# Patient Record
Sex: MALE | Race: WHITE | NOT HISPANIC OR LATINO | Employment: OTHER | URBAN - METROPOLITAN AREA
[De-identification: names, ages, dates, MRNs, and addresses within clinical notes are randomized per-mention and may not be internally consistent; named-entity substitution may affect disease eponyms.]

---

## 2017-01-04 ENCOUNTER — OFFICE VISIT (OUTPATIENT)
Dept: CARDIOLOGY | Facility: CLINIC | Age: 72
End: 2017-01-04

## 2017-01-04 VITALS
DIASTOLIC BLOOD PRESSURE: 64 MMHG | HEIGHT: 70 IN | WEIGHT: 173.3 LBS | BODY MASS INDEX: 24.81 KG/M2 | HEART RATE: 56 BPM | SYSTOLIC BLOOD PRESSURE: 98 MMHG

## 2017-01-04 DIAGNOSIS — R55 SYNCOPE, UNSPECIFIED SYNCOPE TYPE: Primary | ICD-10-CM

## 2017-01-04 DIAGNOSIS — I95.1 ORTHOSTATIC HYPOTENSION: ICD-10-CM

## 2017-01-04 PROCEDURE — 93000 ELECTROCARDIOGRAM COMPLETE: CPT | Performed by: INTERNAL MEDICINE

## 2017-01-04 PROCEDURE — 99203 OFFICE O/P NEW LOW 30 MIN: CPT | Performed by: INTERNAL MEDICINE

## 2017-01-04 NOTE — PROGRESS NOTES
Subjective:     Encounter Date:01/04/2017      Patient ID: Antione Douglas is a 71 y.o. male.    Chief Complaint:Loss of Consciousness        Past Medical History   Diagnosis Date   • Allergic rhinitis    • Anxiety    • Chronic neck pain    • COPD (chronic obstructive pulmonary disease)    • Depression    • Diabetes mellitus    • Duodenal adenoma    • Low back pain    • Sleep apnea      intolerant to CPAP     Past Surgical History   Procedure Laterality Date   • Back surgery         No Known Allergies      Current Outpatient Prescriptions:   •  albuterol (PROVENTIL HFA;VENTOLIN HFA) 108 (90 BASE) MCG/ACT inhaler, ProAir  (90 Base) MCG/ACT Inhalation Aerosol Solution; Patient Sig: ProAir  (90 Base) MCG/ACT Inhalation Aerosol Solution ; 0; 08-Aug-2013; Active, Disp: , Rfl:   •  ALPRAZolam (XANAX) 1 MG tablet, Take 1 tablet by mouth 3 (Three) Times a Day As Needed for anxiety., Disp: 90 tablet, Rfl: 3  •  budesonide-formoterol (SYMBICORT) 160-4.5 MCG/ACT inhaler, Inhale 2 puffs 2 (two) times a day., Disp: , Rfl:   •  furosemide (LASIX) 20 MG tablet, Take 2 tablets by mouth 2 (Two) Times a Day., Disp: 60 tablet, Rfl: 3  •  halobetasol (ULTRAVATE) 0.05 % cream, Apply  topically 2 (Two) Times a Day., Disp: 15 g, Rfl: 1  •  HYDROcodone-acetaminophen (NORCO)  MG per tablet, , Disp: , Rfl:   •  loratadine (CLARITIN) 10 MG tablet, Take 10 mg by mouth Daily., Disp: , Rfl:   •  omeprazole (PriLOSEC) 20 MG capsule, Take 20 mg by mouth Daily., Disp: , Rfl:   •  SITagliptin (JANUVIA) 100 MG tablet, Take 1 tablet by mouth Daily., Disp: 28 tablet, Rfl: 0  •  traZODone (DESYREL) 100 MG tablet, Take 3 tablets by mouth every night., Disp: 270 tablet, Rfl: 3        History of Present Illness    Patient is a 71-year-old  male who we're asked to see for further evaluation of syncope.  He has no previous history of coronary disease.  Notes that he was seen many years ago by Dr. North.  Within the last couple of  "months patient had an episode of syncope.  He went to get a pedicure and at that time noted that he did not feel well was \"uncomfortable\".  He stood up and shortly after this had a syncopal episode.  He presented to his primary care for further evaluation.  There EKG was noted to be abnormal and he was referred to the emergency department for further evaluation.  There he was ruled out for myocardial infarction and discharged home.  He was subsequently scheduled to have cardiac follow-up with us.  Patient notes he has had one other episode of syncope over the last year similar to this usually with position changes.  He denies any chest pain, pressure, tightness.  Does note some shortness of breath with activity.  He is overall quite limited in physical activity secondary to chronic back pain, and walks with the assistance of a cane.  He notes he is typically unsteady.  Denies any edema.  Has had some \"falls\" at home.  Complains that his feet and hands are \"ice cold\".  Patient also incidentally notes that he has lost roughly 65 pounds over the last year unintentionally.  He has not had a colonoscopy performed in almost 5 years per his report.  Unexplained 65 pound weight loss in the morning year.  He notes that he has been drinking lots of water lately because he is \"always thirsty\".  He is recently had his Lasix dose dropped from 3 pills daily to twice daily.  Patient and wife notes he drinks an estimated at 50 ounces or more of water daily in addition to his meals.  The following portions of the patient's history were reviewed and updated as appropriate: allergies, current medications, past family history, past medical history, past social history, past surgical history and problem list.    Family History   Problem Relation Age of Onset   • Coronary artery disease Mother    • Hypertension Mother    • Pancreatic cancer Father    • Coronary artery disease Father        Social History   Substance Use Topics   • " "Smoking status: Former Smoker   • Smokeless tobacco: None      Comment: quit 10 years ago   • Alcohol use Yes      Comment: rare         Review of Systems   Constitution: Positive for weight loss. Negative for fever, weakness and malaise/fatigue.   HENT: Negative for headaches and nosebleeds.    Eyes: Negative for redness and visual disturbance.   Cardiovascular: Positive for syncope. Negative for orthopnea, palpitations and paroxysmal nocturnal dyspnea.   Respiratory: Positive for shortness of breath. Negative for cough, snoring, sputum production and wheezing.    Endocrine: Positive for cold intolerance.   Hematologic/Lymphatic: Negative for bleeding problem.   Skin: Negative for flushing, itching and rash.   Musculoskeletal: Negative for falls, joint pain and muscle cramps.   Gastrointestinal: Negative for abdominal pain, diarrhea, heartburn, nausea and vomiting.   Genitourinary: Negative for hematuria.   Neurological: Positive for disturbances in coordination and dizziness. Negative for excessive daytime sleepiness and tremors.   Psychiatric/Behavioral: Positive for depression. Negative for substance abuse. The patient is nervous/anxious.           Objective:    height is 70\" (177.8 cm) and weight is 173 lb 4.8 oz (78.6 kg). His blood pressure is 98/64 and his pulse is 56.         Physical Exam   Constitutional: He is oriented to person, place, and time. He appears well-developed and well-nourished.   HENT:   Head: Normocephalic and atraumatic.   Mouth/Throat: Oropharynx is clear and moist.   Eyes: Conjunctivae are normal. Pupils are equal, round, and reactive to light.   Neck: Normal carotid pulses and no JVD present. Carotid bruit is not present. No thyromegaly present.   Cardiovascular: Normal rate, regular rhythm, S1 normal and S2 normal.  Exam reveals no gallop and no friction rub.    No murmur heard.  Pulses:       Carotid pulses are 2+ on the right side, and 2+ on the left side.       Dorsalis pedis " pulses are 2+ on the right side, and 2+ on the left side.        Posterior tibial pulses are 2+ on the right side, and 2+ on the left side.   Pulmonary/Chest: No respiratory distress. He has no wheezes. He has no rales. He exhibits no tenderness.   Abdominal: He exhibits no distension, no abdominal bruit and no mass. There is no hepatosplenomegaly. There is no tenderness. There is no rebound.   Musculoskeletal: He exhibits no edema, tenderness or deformity.   Lymphadenopathy:     He has no cervical adenopathy.   Neurological: He is alert and oriented to person, place, and time. He has normal strength.   Skin: Skin is warm and dry. No rash noted. No cyanosis. Nails show no clubbing.   Psychiatric: He has a normal mood and affect. Cognition and memory are normal.         ECG 12 Lead  Date/Time: 1/4/2017 2:44 PM  Performed by: VAL AGUIRRE  Authorized by: VAL AGUIRRE   Rhythm: sinus rhythm  Comments: Nonspecific ST segment changes                  Assessment:   Assessment/Plan      Antione was seen today for loss of consciousness.    Diagnoses and all orders for this visit:    Syncope, unspecified syncope type    Orthostatic hypotension    1.  Recurrent orthostatic dizziness and one possible episode of syncope.  Suspect this is due to relative volume depletion plus some possible autonomic neuropathy.  (Patient is on a continuing high-dose of diuretic and requires lots of volume replacement and despite this, he still has BUN and creatinine consistent with dehydration.  2.  Unexplained 65 pound weight loss over the last year.  3.  Poor functional capacity due to orthopedic/back issues.      Plan: Decrease furosemide to 40 mg daily, and decreased water intake to drink on Thursday.  Patient if still no edema after 7-10 days we'll discontinue the diuretic altogether.  If patient still symptomatic with the correction of his volume depletion, can consider the addition of midodrine or Florinef at that time.       Please  note that portions of this note may have been completed with a voice recognition program. Efforts were made to edit the dictations, but occasionally words are mistranscribed.

## 2017-01-04 NOTE — LETTER
January 4, 2017     Vahe Coronel MD  [address]  [place]  [facility] [address]    Patient: Antione Douglas   YOB: 1945   Date of Visit: 1/4/2017       Dear Dr. Homer MD:    Thank you for referring Antione Douglas to me for evaluation. Below are the relevant portions of my assessment and plan of care.    If you have questions, please do not hesitate to call me. I look forward to following Antione along with you.         Sincerely,        Juan Castillo MD        CC: No Recipients  Juan Castillo MD  1/4/2017  2:46 PM  Signed  Subjective:     Encounter Date:01/04/2017      Patient ID: Antione Douglas is a 71 y.o. male.    Chief Complaint:Loss of Consciousness        Past Medical History   Diagnosis Date   • Allergic rhinitis    • Anxiety    • Chronic neck pain    • COPD (chronic obstructive pulmonary disease)    • Depression    • Diabetes mellitus    • Duodenal adenoma    • Low back pain    • Sleep apnea      intolerant to CPAP     Past Surgical History   Procedure Laterality Date   • Back surgery         No Known Allergies      Current Outpatient Prescriptions:   •  albuterol (PROVENTIL HFA;VENTOLIN HFA) 108 (90 BASE) MCG/ACT inhaler, ProAir  (90 Base) MCG/ACT Inhalation Aerosol Solution; Patient Sig: ProAir  (90 Base) MCG/ACT Inhalation Aerosol Solution ; 0; 08-Aug-2013; Active, Disp: , Rfl:   •  ALPRAZolam (XANAX) 1 MG tablet, Take 1 tablet by mouth 3 (Three) Times a Day As Needed for anxiety., Disp: 90 tablet, Rfl: 3  •  budesonide-formoterol (SYMBICORT) 160-4.5 MCG/ACT inhaler, Inhale 2 puffs 2 (two) times a day., Disp: , Rfl:   •  furosemide (LASIX) 20 MG tablet, Take 2 tablets by mouth 2 (Two) Times a Day., Disp: 60 tablet, Rfl: 3  •  halobetasol (ULTRAVATE) 0.05 % cream, Apply  topically 2 (Two) Times a Day., Disp: 15 g, Rfl: 1  •  HYDROcodone-acetaminophen (NORCO)  MG per tablet, , Disp: , Rfl:   •  loratadine (CLARITIN) 10 MG tablet, Take 10 mg by mouth Daily., Disp: , Rfl:   •   "omeprazole (PriLOSEC) 20 MG capsule, Take 20 mg by mouth Daily., Disp: , Rfl:   •  SITagliptin (JANUVIA) 100 MG tablet, Take 1 tablet by mouth Daily., Disp: 28 tablet, Rfl: 0  •  traZODone (DESYREL) 100 MG tablet, Take 3 tablets by mouth every night., Disp: 270 tablet, Rfl: 3        History of Present Illness    Patient is a 71-year-old  male who we're asked to see for further evaluation of syncope.  He has no previous history of coronary disease.  Notes that he was seen many years ago by Dr. North.  Within the last couple of months patient had an episode of syncope.  He went to get a pedicure and at that time noted that he did not feel well was \"uncomfortable\".  He stood up and shortly after this had a syncopal episode.  He presented to his primary care for further evaluation.  There EKG was noted to be abnormal and he was referred to the emergency department for further evaluation.  There he was ruled out for myocardial infarction and discharged home.  He was subsequently scheduled to have cardiac follow-up with us.  Patient notes he has had one other episode of syncope over the last year similar to this usually with position changes.  He denies any chest pain, pressure, tightness.  Does note some shortness of breath with activity.  He is overall quite limited in physical activity secondary to chronic back pain, and walks with the assistance of a cane.  He notes he is typically unsteady.  Denies any edema.  Has had some \"falls\" at home.  Complains that his feet and hands are \"ice cold\".  Patient also incidentally notes that he has lost roughly 65 pounds over the last year unintentionally.  He has not had a colonoscopy performed in almost 5 years per his report.  Unexplained 65 pound weight loss in the morning year.  He notes that he has been drinking lots of water lately because he is \"always thirsty\".  He is recently had his Lasix dose dropped from 3 pills daily to twice daily.  Patient and wife notes he " "drinks an estimated at 50 ounces or more of water daily in addition to his meals.  The following portions of the patient's history were reviewed and updated as appropriate: allergies, current medications, past family history, past medical history, past social history, past surgical history and problem list.    Family History   Problem Relation Age of Onset   • Coronary artery disease Mother    • Hypertension Mother    • Pancreatic cancer Father    • Coronary artery disease Father        Social History   Substance Use Topics   • Smoking status: Former Smoker   • Smokeless tobacco: None      Comment: quit 10 years ago   • Alcohol use Yes      Comment: rare         Review of Systems   Constitution: Positive for weight loss. Negative for fever, weakness and malaise/fatigue.   HENT: Negative for headaches and nosebleeds.    Eyes: Negative for redness and visual disturbance.   Cardiovascular: Positive for syncope. Negative for orthopnea, palpitations and paroxysmal nocturnal dyspnea.   Respiratory: Positive for shortness of breath. Negative for cough, snoring, sputum production and wheezing.    Endocrine: Positive for cold intolerance.   Hematologic/Lymphatic: Negative for bleeding problem.   Skin: Negative for flushing, itching and rash.   Musculoskeletal: Negative for falls, joint pain and muscle cramps.   Gastrointestinal: Negative for abdominal pain, diarrhea, heartburn, nausea and vomiting.   Genitourinary: Negative for hematuria.   Neurological: Positive for disturbances in coordination and dizziness. Negative for excessive daytime sleepiness and tremors.   Psychiatric/Behavioral: Positive for depression. Negative for substance abuse. The patient is nervous/anxious.           Objective:    height is 70\" (177.8 cm) and weight is 173 lb 4.8 oz (78.6 kg). His blood pressure is 98/64 and his pulse is 56.         Physical Exam   Constitutional: He is oriented to person, place, and time. He appears well-developed and " well-nourished.   HENT:   Head: Normocephalic and atraumatic.   Mouth/Throat: Oropharynx is clear and moist.   Eyes: Conjunctivae are normal. Pupils are equal, round, and reactive to light.   Neck: Normal carotid pulses and no JVD present. Carotid bruit is not present. No thyromegaly present.   Cardiovascular: Normal rate, regular rhythm, S1 normal and S2 normal.  Exam reveals no gallop and no friction rub.    No murmur heard.  Pulses:       Carotid pulses are 2+ on the right side, and 2+ on the left side.       Dorsalis pedis pulses are 2+ on the right side, and 2+ on the left side.        Posterior tibial pulses are 2+ on the right side, and 2+ on the left side.   Pulmonary/Chest: No respiratory distress. He has no wheezes. He has no rales. He exhibits no tenderness.   Abdominal: He exhibits no distension, no abdominal bruit and no mass. There is no hepatosplenomegaly. There is no tenderness. There is no rebound.   Musculoskeletal: He exhibits no edema, tenderness or deformity.   Lymphadenopathy:     He has no cervical adenopathy.   Neurological: He is alert and oriented to person, place, and time. He has normal strength.   Skin: Skin is warm and dry. No rash noted. No cyanosis. Nails show no clubbing.   Psychiatric: He has a normal mood and affect. Cognition and memory are normal.         ECG 12 Lead  Date/Time: 1/4/2017 2:44 PM  Performed by: VAL AGUIRRE  Authorized by: VAL AGUIRRE   Rhythm: sinus rhythm  Comments: Nonspecific ST segment changes                  Assessment:   Assessment/Plan      Antione was seen today for loss of consciousness.    Diagnoses and all orders for this visit:    Syncope, unspecified syncope type    Orthostatic hypotension    1.  Recurrent orthostatic dizziness and one possible episode of syncope.  Suspect this is due to relative volume depletion plus some possible autonomic neuropathy.  (Patient is on a continuing high-dose of diuretic and requires lots of volume replacement  and despite this, he still has BUN and creatinine consistent with dehydration.  2.  Unexplained 65 pound weight loss over the last year.  3.  Poor functional capacity due to orthopedic/back issues.      Plan: Decrease furosemide to 40 mg daily, and decreased water intake to drink on Thursday.  Patient if still no edema after 7-10 days we'll discontinue the diuretic altogether.  If patient still symptomatic with the correction of his volume depletion, can consider the addition of midodrine or Florinef at that time.       Please note that portions of this note may have been completed with a voice recognition program. Efforts were made to edit the dictations, but occasionally words are mistranscribed.

## 2017-01-09 ENCOUNTER — TELEPHONE (OUTPATIENT)
Dept: FAMILY MEDICINE CLINIC | Facility: CLINIC | Age: 72
End: 2017-01-09

## 2017-01-09 NOTE — TELEPHONE ENCOUNTER
----- Message from Ashanti Badillo sent at 1/9/2017 11:01 AM EST -----  Contact: EVIE;WIFE-KRIS  NEEDS RX FOR CONTOUR GLUCOSE TEST STRIPS    [facility]    [phone]  MESSAGE OK

## 2017-01-16 ENCOUNTER — TELEPHONE (OUTPATIENT)
Dept: FAMILY MEDICINE CLINIC | Facility: CLINIC | Age: 72
End: 2017-01-16

## 2017-01-16 NOTE — TELEPHONE ENCOUNTER
----- Message from Margaret Kate sent at 1/16/2017  9:37 AM EST -----  Contact: EVIE RAMIREZ WIFE CALLED  IS CHECKING TO SEE IF WE HAVE ANY SAMPLES OF GENUVIA  IS THE BEST NUMBER TO CALL

## 2017-02-06 ENCOUNTER — OFFICE VISIT (OUTPATIENT)
Dept: FAMILY MEDICINE CLINIC | Facility: CLINIC | Age: 72
End: 2017-02-06

## 2017-02-06 VITALS
HEIGHT: 70 IN | TEMPERATURE: 97.8 F | WEIGHT: 180.6 LBS | RESPIRATION RATE: 20 BRPM | SYSTOLIC BLOOD PRESSURE: 110 MMHG | DIASTOLIC BLOOD PRESSURE: 60 MMHG | HEART RATE: 80 BPM | BODY MASS INDEX: 25.86 KG/M2

## 2017-02-06 DIAGNOSIS — E11.9 TYPE 2 DIABETES MELLITUS WITHOUT COMPLICATION, WITHOUT LONG-TERM CURRENT USE OF INSULIN (HCC): ICD-10-CM

## 2017-02-06 DIAGNOSIS — N18.30 CHRONIC KIDNEY DISEASE, STAGE 3 (HCC): ICD-10-CM

## 2017-02-06 DIAGNOSIS — R74.8 ABNORMAL LIVER ENZYMES: ICD-10-CM

## 2017-02-06 DIAGNOSIS — K59.04 CHRONIC IDIOPATHIC CONSTIPATION: ICD-10-CM

## 2017-02-06 DIAGNOSIS — Z12.11 COLON CANCER SCREENING: ICD-10-CM

## 2017-02-06 DIAGNOSIS — I10 ESSENTIAL HYPERTENSION: Primary | ICD-10-CM

## 2017-02-06 DIAGNOSIS — R49.0 HOARSENESS: ICD-10-CM

## 2017-02-06 PROCEDURE — 99214 OFFICE O/P EST MOD 30 MIN: CPT | Performed by: FAMILY MEDICINE

## 2017-02-06 RX ORDER — GABAPENTIN 300 MG/1
CAPSULE ORAL
COMMUNITY
Start: 2017-01-05 | End: 2017-03-23 | Stop reason: SINTOL

## 2017-02-06 RX ORDER — LUBIPROSTONE 24 UG/1
24 CAPSULE ORAL 2 TIMES DAILY WITH MEALS
Qty: 16 CAPSULE | Refills: 0 | COMMUNITY
Start: 2017-02-06 | End: 2017-08-28

## 2017-02-06 RX ORDER — DULOXETIN HYDROCHLORIDE 30 MG/1
CAPSULE, DELAYED RELEASE ORAL
COMMUNITY
Start: 2017-01-12 | End: 2018-02-22 | Stop reason: DRUGHIGH

## 2017-02-06 NOTE — PROGRESS NOTES
Subjective    FU Diab Type 2, HTN, abnormal KFT's and chronic constipation.    Antione Douglas is a 71 y.o. male.     History of Present Illness     Amitiza 24 mcg - was too expensive and wasn't able to purchase and try; though with intermittent significant constipation problems.    Feet occ swelling, but not too bad; generally uses his furosemide twice a day.  Still with occasional lightheadedness upon getting up from a seated position.  Clears after a few seconds.  It is better but not resolved from last visit.  Psyche streaking plenty of fluids.    Kidney function had improved slightly in terms of GFR at last visit.  Prior to that we have stopped his metformin and placed him on Januvia given the decline in his kidney function.  He does not use anti-inflammatories.    Breathing has been stable.    Continues with hoarseness and some intermittent left ear problems/pain.  Had seen an ENT who placed him on antibiotic and was going to see him back in follow-up.  However in the interim his insurance changed and he can no longer see the provider.    The following portions of the patient's history were reviewed and updated as appropriate: allergies, current medications, past medical history, past social history and problem list.    Review of Systems   Constitutional: Negative.  Negative for unexpected weight change.   HENT: Positive for ear pain (left) and voice change (Hoarseness  for the last few months). Negative for congestion, postnasal drip and sore throat.    Respiratory: Negative.  Negative for cough, shortness of breath and wheezing.    Cardiovascular: Positive for leg swelling (Only slight). Negative for chest pain.   Gastrointestinal: Negative.    Skin: Negative.  Negative for rash.   Neurological: Negative.  Negative for dizziness and headaches.   Psychiatric/Behavioral: Negative for dysphoric mood. The patient is nervous/anxious (stable).        Objective   Physical Exam   Constitutional: He is oriented to  person, place, and time. He appears well-developed and well-nourished.   HENT:   Head: Normocephalic.   Left TM is slightly retracted but clear with a small serous air-fluid level.  No erythema   Eyes: Pupils are equal, round, and reactive to light.   Neck: Neck supple. Carotid bruit is not present.   Cardiovascular: Normal rate and regular rhythm.    Pulmonary/Chest: Effort normal and breath sounds normal. He has no wheezes.   Abdominal: Soft. Bowel sounds are normal.   Musculoskeletal: He exhibits no edema.   Lymphadenopathy:     He has no cervical adenopathy.   Neurological: He is alert and oriented to person, place, and time. He exhibits normal muscle tone.   Slight decrease in sensation to light touch in his lower extremities to about the mid calf.  No skin breakdown or ulcerations noted   Skin: Skin is warm and dry.   Psychiatric: He has a normal mood and affect. His behavior is normal.   Nursing note and vitals reviewed.      Assessment/Plan   Antione was seen today for follow-up, diabetes, hypertension, abnormal kft and chronic constipation.    Diagnoses and all orders for this visit:    Essential hypertension  -     Comprehensive Metabolic Panel  -     CBC & Differential    Type 2 diabetes mellitus without complication, without long-term current use of insulin  -     Hemoglobin A1c  -     MicroAlbumin, Urine, Random    Abnormal liver enzymes    Chronic kidney disease, stage 3  -     Comprehensive Metabolic Panel  -     CBC & Differential    Chronic idiopathic constipation  -     Ambulatory Referral For Screening Colonoscopy    Colon cancer screening  -     Ambulatory Referral For Screening Colonoscopy    Hoarseness  -     Ambulatory Referral to ENT (Otolaryngology)    [id number]  01/2020  Amitiza samples as above; to let us know if this is not helpful for his bowels.  Hopefully once we get things going again over-the-counter MiraLAX etc. will prove to be beneficial.  If not, we'll have the consultation via  colonoscopy already arranged.    To need to monitor his liver function.  Normalized at last visit.    Renal function had improved at last check.  Still not back to baseline of a GFR of about 50-55 but had significantly improved.  Continue avoidance of anti-inflammatory medications and maintenance of hydration advised.  We may also need to cut back on his furosemide.  He is having minimal symptomatic edema and with the orthostatic changes as well as decreased kidney function we may need to back off.  For now can continue.    Needs a new referral to ENT as current ENT no longer taking his assurance.  Also we'll redo a order for screening colonoscopy that has not been followed through on.    Patient will contact us if he has needs prior to his next scheduled appointment.    At this time he has Future Appointments  Date Time Provider Department Center   5/8/2017 2:00 PM MD LEATHA Schulte None    scheduled:    This note signed by Vahe Coronel MD 3:04 PM 2/6/2017

## 2017-02-07 LAB
ALBUMIN SERPL-MCNC: 4.1 G/DL (ref 3.2–4.8)
ALBUMIN/GLOB SERPL: 1.4 G/DL (ref 1.5–2.5)
ALP SERPL-CCNC: 100 U/L (ref 25–100)
ALT SERPL-CCNC: 37 U/L (ref 7–40)
AST SERPL-CCNC: 27 U/L (ref 0–33)
BASOPHILS # BLD AUTO: 0.06 10*3/MM3 (ref 0–0.2)
BASOPHILS NFR BLD AUTO: 0.6 % (ref 0–1)
BILIRUB SERPL-MCNC: 0.4 MG/DL (ref 0.3–1.2)
BUN SERPL-MCNC: 16 MG/DL (ref 9–23)
BUN/CREAT SERPL: 11.4 (ref 7–25)
CALCIUM SERPL-MCNC: 9.8 MG/DL (ref 8.7–10.4)
CHLORIDE SERPL-SCNC: 104 MMOL/L (ref 99–109)
CO2 SERPL-SCNC: 32 MMOL/L (ref 20–31)
CREAT SERPL-MCNC: 1.4 MG/DL (ref 0.6–1.3)
EOSINOPHIL # BLD AUTO: 0.26 10*3/MM3 (ref 0.1–0.3)
EOSINOPHIL NFR BLD AUTO: 2.7 % (ref 0–3)
ERYTHROCYTE [DISTWIDTH] IN BLOOD BY AUTOMATED COUNT: 16.4 % (ref 11.3–14.5)
GLOBULIN SER CALC-MCNC: 3 GM/DL
GLUCOSE SERPL-MCNC: 101 MG/DL (ref 70–100)
HBA1C MFR BLD: 6.4 % (ref 4.8–5.6)
HCT VFR BLD AUTO: 45.3 % (ref 38.9–50.9)
HGB BLD-MCNC: 14.1 G/DL (ref 13.1–17.5)
IMM GRANULOCYTES # BLD: 0.03 10*3/MM3 (ref 0–0.03)
IMM GRANULOCYTES NFR BLD: 0.3 % (ref 0–0.6)
LYMPHOCYTES # BLD AUTO: 2.1 10*3/MM3 (ref 0.6–4.8)
LYMPHOCYTES NFR BLD AUTO: 21.6 % (ref 24–44)
MCH RBC QN AUTO: 30.5 PG (ref 27–31)
MCHC RBC AUTO-ENTMCNC: 31.1 G/DL (ref 32–36)
MCV RBC AUTO: 97.8 FL (ref 80–99)
MONOCYTES # BLD AUTO: 0.91 10*3/MM3 (ref 0–1)
MONOCYTES NFR BLD AUTO: 9.3 % (ref 0–12)
NEUTROPHILS # BLD AUTO: 6.38 10*3/MM3 (ref 1.5–8.3)
NEUTROPHILS NFR BLD AUTO: 65.5 % (ref 41–71)
PLATELET # BLD AUTO: 262 10*3/MM3 (ref 150–450)
POTASSIUM SERPL-SCNC: 4.7 MMOL/L (ref 3.5–5.5)
PROT SERPL-MCNC: 7.1 G/DL (ref 5.7–8.2)
RBC # BLD AUTO: 4.63 10*6/MM3 (ref 4.2–5.76)
SODIUM SERPL-SCNC: 141 MMOL/L (ref 132–146)
WBC # BLD AUTO: 9.74 10*3/MM3 (ref 3.5–10.8)

## 2017-02-08 ENCOUNTER — TELEPHONE (OUTPATIENT)
Dept: FAMILY MEDICINE CLINIC | Facility: CLINIC | Age: 72
End: 2017-02-08

## 2017-02-08 RX ORDER — LIDOCAINE AND PRILOCAINE 25; 25 MG/G; MG/G
CREAM TOPICAL
Qty: 30 G | Refills: 4 | Status: SHIPPED | OUTPATIENT
Start: 2017-02-08 | End: 2017-08-28

## 2017-02-08 NOTE — TELEPHONE ENCOUNTER
Glad she checked - this should be okay;  Neither is an anti-inflammatory which is what he needs to avoid.

## 2017-02-08 NOTE — TELEPHONE ENCOUNTER
Wife had concerns about med prescribed while at ER. He was given hydocodone 10/325 1q 6hours and advised to take OTC tylenol 500mg q6 hours along with it. He has been doing this. she wanted to check with you of his decreased kidney function.

## 2017-02-10 ENCOUNTER — TELEPHONE (OUTPATIENT)
Dept: FAMILY MEDICINE CLINIC | Facility: CLINIC | Age: 72
End: 2017-02-10

## 2017-02-10 NOTE — TELEPHONE ENCOUNTER
Let's hold the pain med for a bit and see if his thinking clears.  If not, or if it gets worse, please go to the ED.

## 2017-02-10 NOTE — TELEPHONE ENCOUNTER
----- Message from Margaret Kate sent at 2/10/2017  3:14 PM EST -----  Contact: EVIE PONCE WIFE  CALLED HAD BASIC QUESTIONS REGARDING THE SIDE EFFECTS OF HIS PAIN MED. SHE WAS ADVISED THAT DR CASE WOULD NOT BE IN UNTIL Monday SHE WOULD LIKE A CALL BACK FROM [facility] IF POSSIBLE PLEASE CALL

## 2017-02-10 NOTE — TELEPHONE ENCOUNTER
Pt's wife wanted you to know that Antione has been asking strange questions and becoming forgetful since starting the pain med and Tylenol for his fall. She was concerned because he did bump his head too. They did not do any scans or anything of his head.  I told her I would send this to you, to see if you had any recommendations.

## 2017-02-23 ENCOUNTER — OFFICE VISIT (OUTPATIENT)
Dept: FAMILY MEDICINE CLINIC | Facility: CLINIC | Age: 72
End: 2017-02-23

## 2017-02-23 VITALS
BODY MASS INDEX: 25.43 KG/M2 | SYSTOLIC BLOOD PRESSURE: 95 MMHG | RESPIRATION RATE: 16 BRPM | HEART RATE: 68 BPM | DIASTOLIC BLOOD PRESSURE: 60 MMHG | HEIGHT: 70 IN | WEIGHT: 177.6 LBS | TEMPERATURE: 98 F

## 2017-02-23 DIAGNOSIS — K59.04 CHRONIC IDIOPATHIC CONSTIPATION: ICD-10-CM

## 2017-02-23 DIAGNOSIS — S20.219D: Primary | ICD-10-CM

## 2017-02-23 PROCEDURE — 99213 OFFICE O/P EST LOW 20 MIN: CPT | Performed by: FAMILY MEDICINE

## 2017-02-23 NOTE — PROGRESS NOTES
Subjective    FU from [facility] ER visit on 2-7-17 for a fall.   NOTE: pt requesting samples of Amitiza again.    Antione Douglas is a 71 y.o. male.     History of Present Illness     2 weeks from fall in tub - rib contusion dx in ED (left side); no LOC - no head injury.  An extra seat that he places on his toilet to keep him from having to get up from such a deep position flipped on him and he fell into the shower    He had no dizziness no chest pain or palpitations.  This was simply a mechanical failure of his toilet seat.    Reports less dizziness upon standing really has been feeling overall better.    Had use a bottle of magnesium citrate to assist with his constipation.  The Amitiza was helpful and is asking for some more samples.    The following portions of the patient's history were reviewed and updated as appropriate: allergies, current medications, past medical history and problem list.    Review of Systems   Constitutional: Negative.  Negative for unexpected weight change.   HENT: Negative.    Respiratory: Negative.  Negative for cough and shortness of breath.    Cardiovascular: Negative.  Negative for chest pain.   Gastrointestinal: Negative.    Musculoskeletal: Positive for back pain (chronic back pain stable).        Left rib pain, slightly better.   Skin: Negative.  Negative for rash.   Neurological: Negative.  Negative for dizziness and headaches.   Psychiatric/Behavioral: Negative.  Negative for dysphoric mood and sleep disturbance. The patient is not nervous/anxious.        Objective   Physical Exam   Constitutional: He is oriented to person, place, and time. He appears well-developed and well-nourished.   His wife is with him today.   HENT:   Head: Normocephalic.   Eyes: Pupils are equal, round, and reactive to light.   Neck: No thyromegaly present.   Cardiovascular: Normal rate, regular rhythm and normal heart sounds.    Pulmonary/Chest: Effort normal and breath sounds normal. He has no wheezes.        Abdominal: Soft. Bowel sounds are normal. There is no tenderness.   Musculoskeletal: He exhibits no edema.   Lymphadenopathy:     He has no cervical adenopathy.   Neurological: He is alert and oriented to person, place, and time.   Skin: Skin is warm and dry.   Psychiatric: He has a normal mood and affect. His behavior is normal. Thought content normal.   Nursing note and vitals reviewed.      Assessment/Plan   Antione was seen today for follow up from [facility] er visit for fall.    Diagnoses and all orders for this visit:    Rib contusion, unspecified laterality, subsequent encounter    Chronic idiopathic constipation    Continue pain medicine as needed and as directed by his pain management physician.  Constipation definitely related somewhat to his pain medicine usage.  Encouraged regular use of M a T's in some more samples were given today as well as adequate hydration and MiraLAX on a daily to twice a day basis.    Other medications can be entertained for his constipation should this not be helping keep follow-up already scheduled with me for May

## 2017-03-23 ENCOUNTER — TELEPHONE (OUTPATIENT)
Dept: FAMILY MEDICINE CLINIC | Facility: CLINIC | Age: 72
End: 2017-03-23

## 2017-03-23 ENCOUNTER — OFFICE VISIT (OUTPATIENT)
Dept: FAMILY MEDICINE CLINIC | Facility: CLINIC | Age: 72
End: 2017-03-23

## 2017-03-23 VITALS
HEART RATE: 68 BPM | HEIGHT: 70 IN | RESPIRATION RATE: 20 BRPM | WEIGHT: 173.8 LBS | BODY MASS INDEX: 24.88 KG/M2 | SYSTOLIC BLOOD PRESSURE: 108 MMHG | TEMPERATURE: 98.1 F | DIASTOLIC BLOOD PRESSURE: 60 MMHG

## 2017-03-23 DIAGNOSIS — I73.9 PERIPHERAL VASCULAR DISEASE OF FOOT (HCC): Primary | ICD-10-CM

## 2017-03-23 DIAGNOSIS — E11.9 TYPE 2 DIABETES MELLITUS WITHOUT COMPLICATION, WITHOUT LONG-TERM CURRENT USE OF INSULIN (HCC): ICD-10-CM

## 2017-03-23 DIAGNOSIS — E11.42 DIABETIC PERIPHERAL NEUROPATHY (HCC): ICD-10-CM

## 2017-03-23 DIAGNOSIS — S22.49XD CLOSED FRACTURE OF MULTIPLE RIBS WITH ROUTINE HEALING, UNSPECIFIED LATERALITY, SUBSEQUENT ENCOUNTER: ICD-10-CM

## 2017-03-23 PROCEDURE — 99214 OFFICE O/P EST MOD 30 MIN: CPT | Performed by: FAMILY MEDICINE

## 2017-03-23 RX ORDER — PREGABALIN 50 MG/1
50 CAPSULE ORAL 3 TIMES DAILY
Qty: 90 CAPSULE | Refills: 3 | Status: SHIPPED | OUTPATIENT
Start: 2017-03-23 | End: 2017-08-28

## 2017-03-23 RX ORDER — DULOXETIN HYDROCHLORIDE 60 MG/1
CAPSULE, DELAYED RELEASE ORAL
COMMUNITY
Start: 2017-03-22 | End: 2019-07-11 | Stop reason: SDUPTHER

## 2017-03-23 NOTE — PROGRESS NOTES
Subjective    Pt has been having cold sensation in BLE / feet and numbness for a long time, taking Gabapentin currently for this but L lower leg and foot has been swelling and turning blue for the past month or so.    Antione Douglas is a 72 y.o. male.     History of Present Illness     Known neuropathy in both extremities.  This is described as a burning tingling sensation for a long time.  Uses gabapentin and it does benefit him some, however he and his wife are both noted that it affects his memory and cognitive functioning.  Ready to try something different he thinks.    Both of feet are numb but recently had noted that his left foot had turned blue on occasion when it had swelled.  Did not notice this on the right.    Rib pain from his most recent rib fractures is better albeit still there.    The following portions of the patient's history were reviewed and updated as appropriate: allergies, current medications, past medical history, past surgical history and problem list.    Review of Systems   Constitutional: Negative.  Negative for unexpected weight change.   HENT: Negative.    Respiratory: Negative.  Negative for cough and shortness of breath.    Cardiovascular: Negative.  Negative for chest pain.   Gastrointestinal: Negative.    Musculoskeletal: Positive for back pain (chronic back pain stable).        Left rib pain, slightly better.   Skin: Negative.  Negative for rash.   Neurological: Positive for numbness (feet bilaterally). Negative for dizziness and headaches.   Psychiatric/Behavioral: Negative.  Negative for dysphoric mood and sleep disturbance. The patient is not nervous/anxious.        Objective   Physical Exam   Constitutional: He is oriented to person, place, and time. He appears well-developed and well-nourished.   HENT:   Head: Normocephalic.   Eyes: Pupils are equal, round, and reactive to light.   Cardiovascular: Normal rate and normal heart sounds.    Pulmonary/Chest: Effort normal and breath  sounds normal. He has no wheezes.   Musculoskeletal: He exhibits no edema.   Neurological: He is alert and oriented to person, place, and time.   He sensation to light touch in his lower extremities to about the mid calf.  Thought I could appreciate trace dorsalis pedis pulses bilaterally.  No skin breakdown noted on his foot particularly the left one.  No blueness to his foot at time of exam today.  Foot is warm with good capillary refill   Skin: Skin is warm and dry.   Psychiatric: He has a normal mood and affect. His behavior is normal. Thought content normal.   Nursing note and vitals reviewed.      Assessment/Plan   Antione was seen today for ble cold & numb.    Diagnoses and all orders for this visit:    Peripheral vascular disease of foot  -     US Ankle / Brachial Indices Extremity Complete; Future    Type 2 diabetes mellitus without complication, without long-term current use of insulin    Diabetic peripheral neuropathy    Closed fracture of multiple ribs with routine healing, unspecified laterality, subsequent encounter    Will check ankle brachial indexes to confirm no significant major peripheral vascular disease.  The blue every 2 his foot may be due to autonomic issues and chronic edema that he gets in his feet.  Try to be aggressive with elevation and wear pressure stockings.    Rib fracture seem to be healing appropriately.    Will stop his gabapentin and try him on Lyrica 50 mg 3 times a day.  Flail yet even better peripheral neuropathy control as well as an improvement in his memory off of the gabapentin.

## 2017-03-23 NOTE — TELEPHONE ENCOUNTER
He can stop his gabapentin and please call in a prescription for Lyrica 50 mg 3 times a day.  About making a change but I wanted to think about it prior to actually prescribing it.  Okay to call this in and inform patient at this time.

## 2017-03-24 DIAGNOSIS — I99.9 VASCULAR DISEASE: Primary | ICD-10-CM

## 2017-03-24 DIAGNOSIS — E08.59 DIABETES MELLITUS DUE TO UNDERLYING CONDITION WITH OTHER CIRCULATORY COMPLICATIONS (HCC): ICD-10-CM

## 2017-04-07 ENCOUNTER — HOSPITAL ENCOUNTER (OUTPATIENT)
Dept: CARDIOLOGY | Facility: HOSPITAL | Age: 72
Discharge: HOME | End: 2017-04-07
Attending: FAMILY MEDICINE | Admitting: FAMILY MEDICINE

## 2017-04-07 DIAGNOSIS — E08.59 DIABETES MELLITUS DUE TO UNDERLYING CONDITION WITH OTHER CIRCULATORY COMPLICATIONS (HCC): ICD-10-CM

## 2017-04-07 DIAGNOSIS — I99.9 VASCULAR DISEASE: ICD-10-CM

## 2017-04-07 PROCEDURE — 93923 UPR/LXTR ART STDY 3+ LVLS: CPT | Performed by: INTERNAL MEDICINE

## 2017-04-07 PROCEDURE — 93923 UPR/LXTR ART STDY 3+ LVLS: CPT

## 2017-04-10 LAB
BH CV LOWER ARTERIAL LEFT ABI RATIO: 1.3
BH CV LOWER ARTERIAL LEFT DORSALIS PEDIS SYS MAX: 124 MMHG
BH CV LOWER ARTERIAL LEFT POST TIBIAL SYS MAX: 137 MMHG
BH CV LOWER ARTERIAL RIGHT ABI RATIO: 1.3
BH CV LOWER ARTERIAL RIGHT DORSALIS PEDIS SYS MAX: 134 MMHG
BH CV LOWER ARTERIAL RIGHT POST TIBIAL SYS MAX: 126 MMHG
UPPER ARTERIAL LEFT ARM BRACHIAL SYS MAX: 106 MMHG
UPPER ARTERIAL RIGHT ARM BRACHIAL SYS MAX: 105 MMHG

## 2017-04-11 DIAGNOSIS — G62.9 NEUROPATHY: Primary | ICD-10-CM

## 2017-04-24 ENCOUNTER — TELEPHONE (OUTPATIENT)
Dept: FAMILY MEDICINE CLINIC | Facility: CLINIC | Age: 72
End: 2017-04-24

## 2017-04-24 NOTE — TELEPHONE ENCOUNTER
----- Message from Ashanti Leger sent at 4/24/2017 10:08 AM EDT -----  Contact: JEVON  PATIENT WIFE CALLING TO SEE IF YOU HAVE ANY SAMPLES:    SYMBICORT 160 4-5 MCG  JANUVIA 100MG    PLEASE CALL KRIS HIS WIFE  [phone]

## 2017-05-17 DIAGNOSIS — F41.1 GENERALIZED ANXIETY DISORDER: ICD-10-CM

## 2017-05-18 RX ORDER — ALPRAZOLAM 1 MG/1
TABLET ORAL
Qty: 90 TABLET | Refills: 2 | OUTPATIENT
Start: 2017-05-18 | End: 2017-08-28 | Stop reason: SDUPTHER

## 2017-08-15 DIAGNOSIS — I10 ESSENTIAL HYPERTENSION: ICD-10-CM

## 2017-08-15 RX ORDER — FUROSEMIDE 20 MG/1
TABLET ORAL
Qty: 270 TABLET | Refills: 0 | Status: SHIPPED | OUTPATIENT
Start: 2017-08-15 | End: 2017-08-28 | Stop reason: SDUPTHER

## 2017-08-28 ENCOUNTER — OFFICE VISIT (OUTPATIENT)
Dept: FAMILY MEDICINE CLINIC | Facility: CLINIC | Age: 72
End: 2017-08-28

## 2017-08-28 VITALS
HEART RATE: 72 BPM | HEIGHT: 70 IN | BODY MASS INDEX: 27.92 KG/M2 | WEIGHT: 195 LBS | TEMPERATURE: 98.2 F | RESPIRATION RATE: 16 BRPM | DIASTOLIC BLOOD PRESSURE: 62 MMHG | SYSTOLIC BLOOD PRESSURE: 118 MMHG

## 2017-08-28 DIAGNOSIS — E53.8 COBALAMIN DEFICIENCY: ICD-10-CM

## 2017-08-28 DIAGNOSIS — E11.42 DIABETIC PERIPHERAL NEUROPATHY (HCC): ICD-10-CM

## 2017-08-28 DIAGNOSIS — F41.1 GENERALIZED ANXIETY DISORDER: ICD-10-CM

## 2017-08-28 DIAGNOSIS — J43.1 PANLOBULAR EMPHYSEMA (HCC): ICD-10-CM

## 2017-08-28 DIAGNOSIS — N18.30 CHRONIC KIDNEY DISEASE, STAGE 3 (HCC): ICD-10-CM

## 2017-08-28 DIAGNOSIS — I10 ESSENTIAL HYPERTENSION: ICD-10-CM

## 2017-08-28 DIAGNOSIS — F51.01 PRIMARY INSOMNIA: ICD-10-CM

## 2017-08-28 DIAGNOSIS — E11.9 TYPE 2 DIABETES MELLITUS WITHOUT COMPLICATION, WITHOUT LONG-TERM CURRENT USE OF INSULIN (HCC): Primary | ICD-10-CM

## 2017-08-28 PROCEDURE — 99214 OFFICE O/P EST MOD 30 MIN: CPT | Performed by: FAMILY MEDICINE

## 2017-08-28 RX ORDER — OMEPRAZOLE 20 MG/1
20 CAPSULE, DELAYED RELEASE ORAL DAILY
Qty: 90 CAPSULE | Refills: 1 | Status: SHIPPED | OUTPATIENT
Start: 2017-08-28 | End: 2018-04-11 | Stop reason: SDUPTHER

## 2017-08-28 RX ORDER — TRAZODONE HYDROCHLORIDE 100 MG/1
300 TABLET ORAL NIGHTLY
Qty: 270 TABLET | Refills: 3 | Status: SHIPPED | OUTPATIENT
Start: 2017-08-28 | End: 2018-02-05

## 2017-08-28 RX ORDER — LINACLOTIDE 145 UG/1
CAPSULE, GELATIN COATED ORAL
COMMUNITY
Start: 2017-07-29 | End: 2018-02-22

## 2017-08-28 RX ORDER — FUROSEMIDE 20 MG/1
TABLET ORAL
Qty: 270 TABLET | Refills: 1 | Status: SHIPPED | OUTPATIENT
Start: 2017-08-28 | End: 2017-11-01 | Stop reason: SDUPTHER

## 2017-08-28 RX ORDER — ALPRAZOLAM 1 MG/1
1 TABLET ORAL 3 TIMES DAILY PRN
Qty: 90 TABLET | Refills: 3 | Status: SHIPPED | OUTPATIENT
Start: 2017-08-28 | End: 2018-04-22 | Stop reason: SDUPTHER

## 2017-08-28 NOTE — PROGRESS NOTES
"Subjective    Follow Up Diab Type 2, HTN, abnormal KFT, chronic constipation, anxiety & neuropathy.  RF: Xanax, Furosemide (#90 day supply) & Trazodone (#90 day supply) and more samples of Januvia.  Pt also needs RF on Omeprazole 20mg that that was Rx by another physician.   Pt also seen Dr. Justine Gómez for his neuropathy and she told him he was already on the med that she would prescribe, however pt is not taking Lyrica anymore because it \"made him crazy.\" (per his wife)     Antione Douglas is a 72 y.o. male.     History of Present Illness     No dizziness - overall much better with his recent weight gain; no headaches.    Needs RF as noted    Neuropathy - relatively stable but it is his biggest irritant/bother    B12 defic - need to recheck on his B12 level as well as a CBC today.    Breathing overall has been better - uses Serevent on a when necessary basis rather than daily.    The following portions of the patient's history were reviewed and updated as appropriate: allergies, current medications, past medical history, past social history and problem list.    Review of Systems   Constitutional: Negative.    HENT: Negative.    Respiratory: Positive for shortness of breath (much better - using symbicort).    Cardiovascular: Negative.  Negative for chest pain.   Gastrointestinal: Negative.  Negative for constipation.   Skin: Negative.    Neurological: Negative.    Psychiatric/Behavioral:        Moods have been much better overall       Objective   Physical Exam   Constitutional: He is oriented to person, place, and time. He appears well-developed and well-nourished.   HENT:   Head: Normocephalic.   Eyes: Pupils are equal, round, and reactive to light.   Cardiovascular: Normal rate, regular rhythm and normal heart sounds.    Pulmonary/Chest: Effort normal and breath sounds normal. He has no wheezes.   Musculoskeletal: He exhibits no edema.   Neurological: He is alert and oriented to person, place, and time. "   Decreased sensation to LT feet and lower leg - unchanged   Skin: Skin is warm and dry.   Psychiatric: He has a normal mood and affect. His behavior is normal.   Nursing note and vitals reviewed.      Assessment/Plan   Antione was seen today for follow-up, diabetes, hypertension, abnormal kft, chronic constipation, anxiety and peripheral neuropathy.    Diagnoses and all orders for this visit:    Type 2 diabetes mellitus without complication, without long-term current use of insulin  -     Lipid Panel With LDL / HDL Ratio  -     Hemoglobin A1c    Diabetic peripheral neuropathy    Essential hypertension  -     furosemide (LASIX) 20 MG tablet; 2 po q am and one po q pm  -     Comprehensive Metabolic Panel  -     CBC & Differential    Chronic kidney disease, stage 3    Generalized anxiety disorder  -     ALPRAZolam (XANAX) 1 MG tablet; Take 1 tablet by mouth 3 (Three) Times a Day As Needed for Anxiety.    Primary insomnia  -     traZODone (DESYREL) 100 MG tablet; Take 3 tablets by mouth Every Night.    Cobalamin deficiency  -     Vitamin B12    Panlobular emphysema  Comments:  Encourage regular twice a day dosing of the Serevent to get its maximal effect.  Sample given today.    Other orders  -     omeprazole (priLOSEC) 20 MG capsule; Take 1 capsule by mouth Daily.    With Antione's weight gain I actually think it's been good.  It looks as good as he has in a year today.  No change in his medications today.    Labs as noted above.    Likely see him in 4-5 months but will await his lab results before we schedule that.  Call with any questions in the interim.  Reminded him of annual flu shots.

## 2017-08-29 LAB
ALBUMIN SERPL-MCNC: 4 G/DL (ref 3.2–4.8)
ALBUMIN/GLOB SERPL: 1.3 G/DL (ref 1.5–2.5)
ALP SERPL-CCNC: 108 U/L (ref 25–100)
ALT SERPL-CCNC: 20 U/L (ref 7–40)
AST SERPL-CCNC: 20 U/L (ref 0–33)
BASOPHILS # BLD AUTO: 0.11 10*3/MM3 (ref 0–0.2)
BASOPHILS NFR BLD AUTO: 0.9 % (ref 0–1)
BILIRUB SERPL-MCNC: 0.4 MG/DL (ref 0.3–1.2)
BUN SERPL-MCNC: 17 MG/DL (ref 9–23)
BUN/CREAT SERPL: 12.1 (ref 7–25)
CALCIUM SERPL-MCNC: 9.4 MG/DL (ref 8.7–10.4)
CHLORIDE SERPL-SCNC: 102 MMOL/L (ref 99–109)
CHOLEST SERPL-MCNC: 142 MG/DL (ref 0–200)
CO2 SERPL-SCNC: 34 MMOL/L (ref 20–31)
CREAT SERPL-MCNC: 1.4 MG/DL (ref 0.6–1.3)
EOSINOPHIL # BLD AUTO: 0.34 10*3/MM3 (ref 0–0.3)
EOSINOPHIL NFR BLD AUTO: 2.9 % (ref 0–3)
ERYTHROCYTE [DISTWIDTH] IN BLOOD BY AUTOMATED COUNT: 15.4 % (ref 11.3–14.5)
GLOBULIN SER CALC-MCNC: 3.2 GM/DL
GLUCOSE SERPL-MCNC: 119 MG/DL (ref 70–100)
HBA1C MFR BLD: 6.2 % (ref 4.8–5.6)
HCT VFR BLD AUTO: 49.3 % (ref 38.9–50.9)
HDLC SERPL-MCNC: 45 MG/DL (ref 40–60)
HGB BLD-MCNC: 16 G/DL (ref 13.1–17.5)
IMM GRANULOCYTES # BLD: 0.06 10*3/MM3 (ref 0–0.03)
IMM GRANULOCYTES NFR BLD: 0.5 % (ref 0–0.6)
LDLC SERPL CALC-MCNC: 85 MG/DL (ref 0–100)
LDLC/HDLC SERPL: 1.88 {RATIO}
LYMPHOCYTES # BLD AUTO: 2.58 10*3/MM3 (ref 0.6–4.8)
LYMPHOCYTES NFR BLD AUTO: 22.1 % (ref 24–44)
MCH RBC QN AUTO: 32.3 PG (ref 27–31)
MCHC RBC AUTO-ENTMCNC: 32.5 G/DL (ref 32–36)
MCV RBC AUTO: 99.4 FL (ref 80–99)
MONOCYTES # BLD AUTO: 1.12 10*3/MM3 (ref 0–1)
MONOCYTES NFR BLD AUTO: 9.6 % (ref 0–12)
NEUTROPHILS # BLD AUTO: 7.49 10*3/MM3 (ref 1.5–8.3)
NEUTROPHILS NFR BLD AUTO: 64 % (ref 41–71)
PLATELET # BLD AUTO: 233 10*3/MM3 (ref 150–450)
POTASSIUM SERPL-SCNC: 4.3 MMOL/L (ref 3.5–5.5)
PROT SERPL-MCNC: 7.2 G/DL (ref 5.7–8.2)
RBC # BLD AUTO: 4.96 10*6/MM3 (ref 4.2–5.76)
SODIUM SERPL-SCNC: 142 MMOL/L (ref 132–146)
TRIGL SERPL-MCNC: 61 MG/DL (ref 0–150)
VIT B12 SERPL-MCNC: 579 PG/ML (ref 211–911)
VLDLC SERPL CALC-MCNC: 12.2 MG/DL
WBC # BLD AUTO: 11.7 10*3/MM3 (ref 3.5–10.8)

## 2017-09-13 ENCOUNTER — OFFICE VISIT (OUTPATIENT)
Dept: FAMILY MEDICINE CLINIC | Facility: CLINIC | Age: 72
End: 2017-09-13

## 2017-09-13 VITALS
TEMPERATURE: 97 F | RESPIRATION RATE: 20 BRPM | DIASTOLIC BLOOD PRESSURE: 80 MMHG | SYSTOLIC BLOOD PRESSURE: 118 MMHG | HEART RATE: 60 BPM | BODY MASS INDEX: 28.46 KG/M2 | WEIGHT: 198.8 LBS | HEIGHT: 70 IN

## 2017-09-13 DIAGNOSIS — J06.9 ACUTE URI: ICD-10-CM

## 2017-09-13 DIAGNOSIS — H65.02 ACUTE SEROUS OTITIS MEDIA OF LEFT EAR, RECURRENCE NOT SPECIFIED: Primary | ICD-10-CM

## 2017-09-13 PROCEDURE — 99213 OFFICE O/P EST LOW 20 MIN: CPT | Performed by: FAMILY MEDICINE

## 2017-09-13 RX ORDER — CEFDINIR 300 MG/1
300 CAPSULE ORAL 2 TIMES DAILY
Qty: 14 CAPSULE | Refills: 0 | Status: SHIPPED | OUTPATIENT
Start: 2017-09-13 | End: 2017-09-21

## 2017-09-13 NOTE — PROGRESS NOTES
Subjective   Antione Douglas is a 72 y.o. male.     History of Present Illness     Symptoms started last week, nasal swelling and tender left nostril, left ear popping and cracking, sore throat.  States that his nose has improved after applying Vaseline to the nostril.  + PND and head congestion  Denies fever, chills.   Currently not treating symptoms with any medication.     The following portions of the patient's history were reviewed and updated as appropriate: allergies, current medications, past family history, past medical history, past social history, past surgical history and problem list.    Review of Systems   Constitutional: Negative for chills and fever.   HENT: Positive for congestion, postnasal drip and sore throat. Negative for ear discharge, ear pain and sinus pressure.    Respiratory: Negative for cough, shortness of breath and wheezing.    Cardiovascular: Negative for chest pain.   Gastrointestinal: Negative for nausea and vomiting.   Neurological: Negative for dizziness, light-headedness and headaches.       Objective   Physical Exam   Constitutional: He is oriented to person, place, and time. He appears well-developed and well-nourished.   HENT:   Head: Normocephalic and atraumatic.   Right Ear: Hearing, tympanic membrane, external ear and ear canal normal.   Left Ear: Hearing, external ear and ear canal normal. Tympanic membrane is erythematous. A middle ear effusion is present.   Nose: Mucosal edema present.   Mouth/Throat: Uvula is midline and mucous membranes are normal. Oropharyngeal exudate present.   Eyes: Conjunctivae and EOM are normal.   Neck: Normal range of motion. Neck supple.   Cardiovascular: Normal rate, regular rhythm and normal heart sounds.    Pulmonary/Chest: Effort normal and breath sounds normal. He has no wheezes.   Musculoskeletal: He exhibits no deformity.   Lymphadenopathy:     He has no cervical adenopathy.   Neurological: He is alert and oriented to person, place, and  time.   Skin: Skin is warm and dry.   Psychiatric: His behavior is normal.   Nursing note and vitals reviewed.      Assessment/Plan   Antione was seen today for facial swelling, ear problem and sore throat.    Diagnoses and all orders for this visit:    Acute serous otitis media of left ear, recurrence not specified  -     cefdinir (OMNICEF) 300 MG capsule; Take 1 capsule by mouth 2 (Two) Times a Day for 7 days.    Acute URI      Treatment plan above  Follow up if no improvement

## 2017-09-13 NOTE — PATIENT INSTRUCTIONS
Go to the nearest ER or return to clinic if symptoms worsen, fever/chill develop      Otitis Media, Adult  Otitis media is redness, soreness, and puffiness (swelling) in the space just behind your eardrum (middle ear). It may be caused by allergies or infection. It often happens along with a cold.  HOME CARE  · Take your medicine as told. Finish it even if you start to feel better.  · Only take over-the-counter or prescription medicines for pain, discomfort, or fever as told by your doctor.  · Follow up with your doctor as told.  GET HELP IF:  · You have otitis media only in one ear, or bleeding from your nose, or both.  · You notice a lump on your neck.  · You are not getting better in 3-5 days.  · You feel worse instead of better.  GET HELP RIGHT AWAY IF:   · You have pain that is not helped with medicine.  · You have puffiness, redness, or pain around your ear.  · You get a stiff neck.  · You cannot move part of your face (paralysis).  · You notice that the bone behind your ear hurts when you touch it.  MAKE SURE YOU:   · Understand these instructions.  · Will watch your condition.  · Will get help right away if you are not doing well or get worse.     This information is not intended to replace advice given to you by your health care provider. Make sure you discuss any questions you have with your health care provider.     Document Released: 06/05/2009 Document Revised: 01/08/2016 Document Reviewed: 07/15/2014  [employer] Interactive Patient Education ©2017 [employer]

## 2017-09-21 ENCOUNTER — TELEPHONE (OUTPATIENT)
Dept: FAMILY MEDICINE CLINIC | Facility: CLINIC | Age: 72
End: 2017-09-21

## 2017-09-21 RX ORDER — AMOXICILLIN AND CLAVULANATE POTASSIUM 875; 125 MG/1; MG/1
1 TABLET, FILM COATED ORAL 2 TIMES DAILY
Qty: 14 TABLET | Refills: 0 | Status: SHIPPED | OUTPATIENT
Start: 2017-09-21 | End: 2017-09-28

## 2017-09-21 NOTE — TELEPHONE ENCOUNTER
----- Message from Sujey Angela sent at 9/21/2017 11:08 AM EDT -----  Contact: DR BARBOSA MED REQUEST  PATIENT HAS ONE MORE PILL LEFT OF THE ANTIBIOTIC THAT YOU PRESCRIBED HIM FOR HIS EAR INFECTION. PATIENT STILL FEELS THE SAME AND DOESN'T THINK IT HAS HELPED. PATIENT WANTS TO KNOW IF YOU WILL SEND IN A STRONGER ANTIBIOTIC FOR HIM OR ANOTHER ROUND OF THE ONE HE IS CURRENTLY TAKING? HIS PHARMACY IS [facility] IN [place].  [phone]

## 2017-09-21 NOTE — TELEPHONE ENCOUNTER
Will send Augmentin to pharmacy.   If symptoms continue after the new antibiotic, he will need to be seen again. BRYANNA

## 2017-11-01 ENCOUNTER — OFFICE VISIT (OUTPATIENT)
Dept: FAMILY MEDICINE CLINIC | Facility: CLINIC | Age: 72
End: 2017-11-01

## 2017-11-01 VITALS
OXYGEN SATURATION: 97 % | WEIGHT: 207.8 LBS | TEMPERATURE: 96 F | BODY MASS INDEX: 29.75 KG/M2 | HEIGHT: 70 IN | DIASTOLIC BLOOD PRESSURE: 78 MMHG | HEART RATE: 58 BPM | RESPIRATION RATE: 20 BRPM | SYSTOLIC BLOOD PRESSURE: 102 MMHG

## 2017-11-01 DIAGNOSIS — I10 ESSENTIAL HYPERTENSION: ICD-10-CM

## 2017-11-01 DIAGNOSIS — J06.9 ACUTE URI: Primary | ICD-10-CM

## 2017-11-01 PROCEDURE — 99213 OFFICE O/P EST LOW 20 MIN: CPT | Performed by: FAMILY MEDICINE

## 2017-11-01 RX ORDER — FUROSEMIDE 20 MG/1
TABLET ORAL
Qty: 270 TABLET | Refills: 1 | Status: SHIPPED | OUTPATIENT
Start: 2017-11-01 | End: 2019-10-16 | Stop reason: SDUPTHER

## 2017-11-01 RX ORDER — DOXYCYCLINE 100 MG/1
100 CAPSULE ORAL 2 TIMES DAILY
Qty: 14 CAPSULE | Refills: 0 | Status: SHIPPED | OUTPATIENT
Start: 2017-11-01 | End: 2017-11-08

## 2017-11-01 NOTE — PROGRESS NOTES
Subjective   Antione Douglas is a 72 y.o. male.     Cough   This is a new problem. The current episode started in the past 7 days. The problem has been gradually worsening. The problem occurs every few minutes. The cough is productive of sputum. Associated symptoms include chills, ear congestion, nasal congestion and postnasal drip. The symptoms are aggravated by lying down. He has tried nothing for the symptoms. The treatment provided no relief.   URI    This is a new problem. The current episode started in the past 7 days. There has been no fever. Associated symptoms include congestion, coughing and a plugged ear sensation. Pertinent negatives include no diarrhea, nausea or sinus pain.      He is also requesting a refill of Lasix, treating HTN.   Stable condition.   Labs updated August 2017, kidney function stable.     The following portions of the patient's history were reviewed and updated as appropriate: allergies, current medications, past family history, past medical history, past social history, past surgical history and problem list.    Review of Systems   Constitutional: Positive for chills.   HENT: Positive for congestion and postnasal drip. Negative for sinus pain.    Respiratory: Positive for cough. Negative for chest tightness.    Cardiovascular: Negative for palpitations.   Gastrointestinal: Negative for diarrhea and nausea.   Neurological: Negative for dizziness and light-headedness.   Hematological: Negative for adenopathy.       Objective   Physical Exam   Constitutional: He is oriented to person, place, and time. He appears well-developed and well-nourished.   HENT:   Head: Normocephalic and atraumatic.   Right Ear: Hearing, tympanic membrane, external ear and ear canal normal.   Left Ear: Hearing, tympanic membrane, external ear and ear canal normal.   Nose: Mucosal edema and rhinorrhea present.   Mouth/Throat: Uvula is midline and mucous membranes are normal. Oropharyngeal exudate present.   Eyes:  Conjunctivae and EOM are normal.   Neck: Normal range of motion. Neck supple.   Cardiovascular: Normal rate, regular rhythm and normal heart sounds.    Pulmonary/Chest: Effort normal and breath sounds normal. No respiratory distress. He has no wheezes.   Musculoskeletal: He exhibits no edema.   Lymphadenopathy:     He has no cervical adenopathy.   Neurological: He is alert and oriented to person, place, and time. No cranial nerve deficit.   Skin: Skin is warm and dry.   Psychiatric: He has a normal mood and affect. His behavior is normal.   Nursing note and vitals reviewed.      Assessment/Plan   Antione was seen today for cough, uri, shortness of breath and med refill.    Diagnoses and all orders for this visit:    Acute URI  -     doxycycline (MONODOX) 100 MG capsule; Take 1 capsule by mouth 2 (Two) Times a Day for 7 days.    Essential hypertension  -     furosemide (LASIX) 20 MG tablet; 2 po q am and one po q pm      Lasix refilled  Will treat URI with Doxycycline. Follow up if no improvement.

## 2017-11-01 NOTE — PATIENT INSTRUCTIONS
"Go to the nearest ER or return to clinic if symptoms worsen, fever/chill develop      Upper Respiratory Infection, Adult  Most upper respiratory infections (URIs) are caused by a virus. A URI affects the nose, throat, and upper air passages. The most common type of URI is often called \"the common cold.\"  HOME CARE   · Take medicines only as told by your doctor.  · Gargle warm saltwater or take cough drops to comfort your throat as told by your doctor.  · Use a warm mist humidifier or inhale steam from a shower to increase air moisture. This may make it easier to breathe.  · Drink enough fluid to keep your pee (urine) clear or pale yellow.  · Eat soups and other clear broths.  · Have a healthy diet.  · Rest as needed.  · Go back to work when your fever is gone or your doctor says it is okay.  ¨ You may need to stay home longer to avoid giving your URI to others.  ¨ You can also wear a face mask and wash your hands often to prevent spread of the virus.  · Use your inhaler more if you have asthma.  · Do not use any tobacco products, including cigarettes, chewing tobacco, or electronic cigarettes. If you need help quitting, ask your doctor.  GET HELP IF:  · You are getting worse, not better.  · Your symptoms are not helped by medicine.  · You have chills.  · You are getting more short of breath.  · You have brown or red mucus.  · You have yellow or brown discharge from your nose.  · You have pain in your face, especially when you bend forward.  · You have a fever.  · You have puffy (swollen) neck glands.  · You have pain while swallowing.  · You have white areas in the back of your throat.  GET HELP RIGHT AWAY IF:   · You have very bad or constant:    Headache.    Ear pain.    Pain in your forehead, behind your eyes, and over your cheekbones (sinus pain).    Chest pain.  · You have long-lasting (chronic) lung disease and any of the following:    Wheezing.    Long-lasting cough.    Coughing up blood.    A change in your " usual mucus.  · You have a stiff neck.  · You have changes in your:    Vision.    Hearing.    Thinking.    Mood.  MAKE SURE YOU:   · Understand these instructions.  · Will watch your condition.  · Will get help right away if you are not doing well or get worse.     This information is not intended to replace advice given to you by your health care provider. Make sure you discuss any questions you have with your health care provider.     Document Released: 06/05/2009 Document Revised: 05/03/2016 Document Reviewed: 03/25/2015  [employer] Interactive Patient Education ©2017 [employer]

## 2017-11-07 ENCOUNTER — TELEPHONE (OUTPATIENT)
Dept: FAMILY MEDICINE CLINIC | Facility: CLINIC | Age: 72
End: 2017-11-07

## 2017-11-07 RX ORDER — AMOXICILLIN AND CLAVULANATE POTASSIUM 875; 125 MG/1; MG/1
1 TABLET, FILM COATED ORAL 2 TIMES DAILY
Qty: 14 TABLET | Refills: 0 | Status: SHIPPED | OUTPATIENT
Start: 2017-11-07 | End: 2017-11-14

## 2017-11-07 NOTE — TELEPHONE ENCOUNTER
----- Message from Sujey Angela sent at 11/7/2017  4:08 PM EST -----  Contact: DR BARBOSA  PATIENT HAS FINISHED THE MEDICATION THAT YOU PRESCRIBED HIM BUT HE DOES NOT FEEL ANY BETTER. PATIENT WANTS TO KNOW IF [facility] WILL [facility] [place] [facility]?    [phone]

## 2017-11-20 ENCOUNTER — OFFICE VISIT (OUTPATIENT)
Dept: FAMILY MEDICINE CLINIC | Facility: CLINIC | Age: 72
End: 2017-11-20

## 2017-11-20 VITALS
TEMPERATURE: 97.9 F | HEART RATE: 100 BPM | WEIGHT: 206 LBS | SYSTOLIC BLOOD PRESSURE: 130 MMHG | DIASTOLIC BLOOD PRESSURE: 90 MMHG | HEIGHT: 70 IN | RESPIRATION RATE: 20 BRPM | BODY MASS INDEX: 29.49 KG/M2

## 2017-11-20 DIAGNOSIS — Z23 NEED FOR INFLUENZA VACCINATION: ICD-10-CM

## 2017-11-20 DIAGNOSIS — M54.50 ACUTE BILATERAL LOW BACK PAIN WITHOUT SCIATICA: Primary | ICD-10-CM

## 2017-11-20 PROCEDURE — 99213 OFFICE O/P EST LOW 20 MIN: CPT | Performed by: FAMILY MEDICINE

## 2017-11-20 PROCEDURE — G0008 ADMIN INFLUENZA VIRUS VAC: HCPCS | Performed by: FAMILY MEDICINE

## 2017-11-20 PROCEDURE — 90662 IIV NO PRSV INCREASED AG IM: CPT | Performed by: FAMILY MEDICINE

## 2017-11-20 RX ORDER — PREDNISONE 20 MG/1
20 TABLET ORAL 2 TIMES DAILY
Qty: 10 TABLET | Refills: 0 | Status: SHIPPED | OUTPATIENT
Start: 2017-11-20 | End: 2017-11-25

## 2017-11-20 RX ORDER — CYCLOBENZAPRINE HCL 10 MG
10 TABLET ORAL 2 TIMES DAILY PRN
Qty: 30 TABLET | Refills: 0 | Status: SHIPPED | OUTPATIENT
Start: 2017-11-20 | End: 2017-11-30

## 2017-11-20 NOTE — PROGRESS NOTES
Subjective   Antione Douglas is a 72 y.o. male.     Back Pain   This is a new problem. The current episode started 1 to 4 weeks ago. The problem occurs 2 to 4 times per day. The problem is unchanged. The pain is present in the lumbar spine and thoracic spine. Quality: sharp. The pain does not radiate. The pain is at a severity of 8/10. The pain is moderate. The symptoms are aggravated by position and bending. Pertinent negatives include no abdominal pain, bladder incontinence, bowel incontinence, leg pain, numbness, paresthesias, perianal numbness or weakness. He has tried heat for the symptoms. The treatment provided mild relief.   No history of kidney stones.        The following portions of the patient's history were reviewed and updated as appropriate: allergies, current medications, past family history, past medical history, past social history, past surgical history and problem list.    Review of Systems   Constitutional: Negative.    Respiratory: Negative.    Cardiovascular: Negative.    Gastrointestinal: Negative for abdominal pain and bowel incontinence.   Genitourinary: Negative for bladder incontinence.   Musculoskeletal: Positive for back pain. Negative for gait problem.   Skin: Negative for wound.   Neurological: Negative for dizziness, weakness, numbness and paresthesias.       Objective   Physical Exam   Constitutional: He is oriented to person, place, and time. He appears well-developed and well-nourished. No distress.   HENT:   Head: Normocephalic and atraumatic.   Right Ear: External ear normal.   Left Ear: External ear normal.   Nose: Nose normal.   Eyes: Conjunctivae are normal.   Cardiovascular: Normal rate, regular rhythm and normal heart sounds.    Pulmonary/Chest: Effort normal and breath sounds normal.   Abdominal: Soft. There is no CVA tenderness.   Musculoskeletal: He exhibits no deformity.        Lumbar back: He exhibits pain and spasm. He exhibits no bony tenderness, no swelling and no  edema.   Neurological: He is alert and oriented to person, place, and time. No cranial nerve deficit.   Skin: Skin is warm and dry.   Psychiatric: He has a normal mood and affect. His behavior is normal.   Nursing note and vitals reviewed.      Assessment/Plan   Antione was seen today for back pain and flu vaccine.    Diagnoses and all orders for this visit:    Acute bilateral low back pain without sciatica  -     predniSONE (DELTASONE) 20 MG tablet; Take 1 tablet by mouth 2 (Two) Times a Day for 5 days.  -     cyclobenzaprine (FLEXERIL) 10 MG tablet; Take 1 tablet by mouth 2 (Two) Times a Day As Needed for Muscle Spasms.    Need for influenza vaccination  -     Flu Vaccine High Dose PF 65YR+ ([id number])    Other orders  -     Cancel: POCT urinalysis dipstick, automated      He was unable to provide urine sample  Based on recent injury, feel that symptoms are related to musculoskeletal pain. Will treat with temporary steroid and prn muscle relaxer. He is aware that it can cause drowsiness and use with caution.

## 2017-11-20 NOTE — PATIENT INSTRUCTIONS
Go to the nearest ER or return to clinic if symptoms worsen, fever/chill develop      Back Exercises  The following exercises strengthen the muscles that help to support the back. They also help to keep the lower back flexible. Doing these exercises can help to prevent back pain or lessen existing pain.  If you have back pain or discomfort, try doing these exercises 2-3 times each day or as told by your health care provider. When the pain goes away, do them once each day, but increase the number of times that you repeat the steps for each exercise (do more repetitions). If you do not have back pain or discomfort, do these exercises once each day or as told by your health care provider.  EXERCISES  Single Knee to Chest  Repeat these steps 3-5 times for each le. Lie on your back on a firm bed or the floor with your legs extended.  2. Bring one knee to your chest. Your other leg should stay extended and in contact with the floor.  3. Hold your knee in place by grabbing your knee or thigh.  4. Pull on your knee until you feel a gentle stretch in your lower back.  5. Hold the stretch for 10-30 seconds.  6. Slowly release and straighten your leg.  Pelvic Tilt  Repeat these steps 5-10 times:  1. Lie on your back on a firm bed or the floor with your legs extended.  2. Bend your knees so they are pointing toward the ceiling and your feet are flat on the floor.  3. Tighten your lower abdominal muscles to press your lower back against the floor. This motion will tilt your pelvis so your tailbone points up toward the ceiling instead of pointing to your feet or the floor.  4. With gentle tension and even breathing, hold this position for 5-10 seconds.  Cat-Cow  Repeat these steps until your lower back becomes more flexible:  1. Get into a hands-and-knees position on a firm surface. Keep your hands under your shoulders, and keep your knees under your hips. You may place padding under your knees for comfort.  2. Let your head  hang down, and point your tailbone toward the floor so your lower back becomes rounded like the back of a cat.  3. Hold this position for 5 seconds.  4. Slowly lift your head and point your tailbone up toward the ceiling so your back forms a sagging arch like the back of a cow.  5. Hold this position for 5 seconds.  Press-Ups  Repeat these steps 5-10 times:  1. Lie on your abdomen (face-down) on the floor.  2. Place your palms near your head, about shoulder-width apart.  3. While you keep your back as relaxed as possible and keep your hips on the floor, slowly straighten your arms to raise the top half of your body and lift your shoulders. Do not use your back muscles to raise your upper torso. You may adjust the placement of your hands to make yourself more comfortable.  4. Hold this position for 5 seconds while you keep your back relaxed.  5. Slowly return to lying flat on the floor.  Bridges  Repeat these steps 10 times:  1. Lie on your back on a firm surface.  2. Bend your knees so they are pointing toward the ceiling and your feet are flat on the floor.  3. Tighten your buttocks muscles and lift your buttocks off of the floor until your waist is at almost the same height as your knees. You should feel the muscles working in your buttocks and the back of your thighs. If you do not feel these muscles, slide your feet 1-2 inches farther away from your buttocks.  4. Hold this position for 3-5 seconds.  5. Slowly lower your hips to the starting position, and allow your buttocks muscles to relax completely.  If this exercise is too easy, try doing it with your arms crossed over your chest.  Abdominal Crunches  Repeat these steps 5-10 times:  1. Lie on your back on a firm bed or the floor with your legs extended.  2. Bend your knees so they are pointing toward the ceiling and your feet are flat on the floor.  3. Cross your arms over your chest.  4. Tip your chin slightly toward your chest without bending your  neck.  5. Tighten your abdominal muscles and slowly raise your trunk (torso) high enough to lift your shoulder blades a tiny bit off of the floor. Avoid raising your torso higher than that, because it can put too much stress on your low back and it does not help to strengthen your abdominal muscles.  6. Slowly return to your starting position.  Back Lifts  Repeat these steps 5-10 times:  1. Lie on your abdomen (face-down) with your arms at your sides, and rest your forehead on the floor.  2. Tighten the muscles in your legs and your buttocks.  3. Slowly lift your chest off of the floor while you keep your hips pressed to the floor. Keep the back of your head in line with the curve in your back. Your eyes should be looking at the floor.  4. Hold this position for 3-5 seconds.  5. Slowly return to your starting position.  SEEK MEDICAL CARE IF:  · Your back pain or discomfort gets much worse when you do an exercise.  · Your back pain or discomfort does not lessen within 2 hours after you exercise.  If you have any of these problems, stop doing these exercises right away. Do not do them again unless your health care provider says that you can.  SEEK IMMEDIATE MEDICAL CARE IF:  · You develop sudden, severe back pain. If this happens, stop doing the exercises right away. Do not do them again unless your health care provider says that you can.     This information is not intended to replace advice given to you by your health care provider. Make sure you discuss any questions you have with your health care provider.     Document Released: 01/25/2006 Document Revised: 09/07/2016 Document Reviewed: 02/11/2016  Elsevier Interactive Patient Education ©2017 [employer]

## 2017-11-30 ENCOUNTER — OFFICE VISIT (OUTPATIENT)
Dept: FAMILY MEDICINE CLINIC | Facility: CLINIC | Age: 72
End: 2017-11-30

## 2017-11-30 VITALS
SYSTOLIC BLOOD PRESSURE: 118 MMHG | BODY MASS INDEX: 29.69 KG/M2 | RESPIRATION RATE: 20 BRPM | TEMPERATURE: 98 F | WEIGHT: 207.4 LBS | DIASTOLIC BLOOD PRESSURE: 68 MMHG | HEIGHT: 70 IN | HEART RATE: 76 BPM

## 2017-11-30 DIAGNOSIS — R39.11 URINARY HESITANCY: Primary | ICD-10-CM

## 2017-11-30 DIAGNOSIS — R35.1 NOCTURIA: ICD-10-CM

## 2017-11-30 DIAGNOSIS — E11.42 DIABETIC PERIPHERAL NEUROPATHY (HCC): ICD-10-CM

## 2017-11-30 LAB
BILIRUB BLD-MCNC: NEGATIVE MG/DL
CLARITY, POC: CLEAR
COLOR UR: YELLOW
GLUCOSE UR STRIP-MCNC: NEGATIVE MG/DL
KETONES UR QL: NEGATIVE
LEUKOCYTE EST, POC: NEGATIVE
NITRITE UR-MCNC: NEGATIVE MG/ML
PH UR: 6 [PH] (ref 5–8)
PROT UR STRIP-MCNC: ABNORMAL MG/DL
RBC # UR STRIP: NEGATIVE /UL
SP GR UR: 1.02 (ref 1–1.03)
UROBILINOGEN UR QL: NORMAL

## 2017-11-30 PROCEDURE — 81003 URINALYSIS AUTO W/O SCOPE: CPT | Performed by: FAMILY MEDICINE

## 2017-11-30 PROCEDURE — 99214 OFFICE O/P EST MOD 30 MIN: CPT | Performed by: FAMILY MEDICINE

## 2017-11-30 RX ORDER — TAMSULOSIN HYDROCHLORIDE 0.4 MG/1
1 CAPSULE ORAL NIGHTLY
Qty: 30 CAPSULE | Refills: 5 | Status: SHIPPED | OUTPATIENT
Start: 2017-11-30 | End: 2017-12-14

## 2017-11-30 RX ORDER — AMITRIPTYLINE HYDROCHLORIDE 25 MG/1
25 TABLET, FILM COATED ORAL NIGHTLY
Qty: 30 TABLET | Refills: 5 | Status: SHIPPED | OUTPATIENT
Start: 2017-11-30 | End: 2018-02-22 | Stop reason: SDDI

## 2017-11-30 NOTE — PROGRESS NOTES
Subjective    1. Nocturia, but when he tries to go has trouble starting stream for the past 3 wks.  2. Feet are staying cold all the time and neuropathy is getting worse.  3. Needs samples of Symbicort    Antione Douglas is a 72 y.o. male.     History of Present Illness     Urinary issues started after he fell onto his bottom and low back.  Saw Dr. Giraldo.  Believed to be more musculoskeletal related.  Since that time his had trouble with worsening nocturia and urinary hesitancy.    Worsening problems with his peripheral neuropathy constant coldness in his feet and hands.  Feet worsen his hands.  Turning sensation as well.  Had a significant trouble with Lyrica and gabapentin in the past both causing some mental status changes.  Has had arterial Dopplers looked / MADHU    The following portions of the patient's history were reviewed and updated as appropriate: allergies, current medications, past medical history and problem list.    Review of Systems   Constitutional: Negative for fever.   HENT: Negative.    Respiratory: Negative for cough.    Cardiovascular: Negative for chest pain and leg swelling.   Endocrine: Positive for cold intolerance.   Genitourinary: Positive for difficulty urinating and urgency. Negative for dysuria.   Musculoskeletal: Positive for back pain (chronic).   Neurological: Negative for dizziness and headaches.   Psychiatric/Behavioral: Nervous/anxious: anxiety is stable.        Objective   Physical Exam   Constitutional: He appears well-developed and well-nourished.   Cardiovascular: Normal rate.    Pulmonary/Chest: Effort normal and breath sounds normal. He has no wheezes.   Abdominal: Soft. Bowel sounds are normal.   Musculoskeletal: He exhibits no edema.   Neurological: He is alert.   Decreased sensation to LT both feet to lower leg, unchanged; trace DP pulses bilat   Psychiatric: He has a normal mood and affect. His behavior is normal.   Nursing note and vitals reviewed.      Assessment/Plan    Antione was seen today for difficulty urinating and peripheral neuropathy.    Diagnoses and all orders for this visit:    Urinary hesitancy  -     tamsulosin (FLOMAX) 0.4 MG capsule 24 hr capsule; Take 1 capsule by mouth Every Night.  -     POC Urinalysis Dipstick, Automated    Diabetic peripheral neuropathy  -     amitriptyline (ELAVIL) 25 MG tablet; Take 1 tablet by mouth Every Night.    Nocturia  -     tamsulosin (FLOMAX) 0.4 MG capsule 24 hr capsule; Take 1 capsule by mouth Every Night.  -     POC Urinalysis Dipstick, Automated    Will give him some Flomax to use nightly for the next month or so.  If due to his fall a month of treatment should be enough.  He continues to need the Flomax that's fine but I like him to try stopping it after a month if it successfully improves his urinary hesitancy and nocturia.    Trial of Elavil as noted above for his neuropathy.  Advised of the sedation and dry mouth effects possible.

## 2017-12-14 ENCOUNTER — TELEPHONE (OUTPATIENT)
Dept: FAMILY MEDICINE CLINIC | Facility: CLINIC | Age: 72
End: 2017-12-14

## 2017-12-14 DIAGNOSIS — N40.1 BENIGN PROSTATIC HYPERPLASIA WITH URINARY HESITANCY: Primary | ICD-10-CM

## 2017-12-14 DIAGNOSIS — R39.11 BENIGN PROSTATIC HYPERPLASIA WITH URINARY HESITANCY: Primary | ICD-10-CM

## 2017-12-14 RX ORDER — DOXAZOSIN MESYLATE 4 MG/1
4 TABLET ORAL NIGHTLY
Qty: 30 TABLET | Refills: 3 | Status: SHIPPED | OUTPATIENT
Start: 2017-12-14 | End: 2018-02-22

## 2017-12-14 NOTE — TELEPHONE ENCOUNTER
----- Message from Javier Terrell sent at 12/14/2017  3:50 PM EST -----  Contact: JEVON/PT CALL  KRIS MCMAHON PATIENTS WIFE HAS SOME QUESTIONS ABOUT HUSBANDS PROSTATE AND CONCERNS ABOUT HIS URINATION    PT CALL [phone]

## 2017-12-14 NOTE — TELEPHONE ENCOUNTER
Pt has been taking the Tamsulosin you Rx'd at last visit and he is still having trouble starting his urine stream. Thinks he needs something else and would also like you to place an order for a PSA test too.

## 2017-12-15 ENCOUNTER — RESULTS ENCOUNTER (OUTPATIENT)
Dept: FAMILY MEDICINE CLINIC | Facility: CLINIC | Age: 72
End: 2017-12-15

## 2017-12-15 DIAGNOSIS — R39.11 BENIGN PROSTATIC HYPERPLASIA WITH URINARY HESITANCY: ICD-10-CM

## 2017-12-15 DIAGNOSIS — N40.1 BENIGN PROSTATIC HYPERPLASIA WITH URINARY HESITANCY: ICD-10-CM

## 2017-12-18 ENCOUNTER — TELEPHONE (OUTPATIENT)
Dept: FAMILY MEDICINE CLINIC | Facility: CLINIC | Age: 72
End: 2017-12-18

## 2017-12-18 NOTE — TELEPHONE ENCOUNTER
----- Message from Javier Terrell sent at 12/18/2017 10:38 AM EST -----  Contact: DR CASE / PT CALL  JOSE CRUZ FINNEGAN CALLED AND STATED THAT A PA IS NEEDED FOR doxazosin (CARDURA)    [facility] [phone]   MEMBER  ID [id number]    JOSE CRUZ FINNEGAN  - [phone]

## 2017-12-19 LAB — PSA SERPL-MCNC: 3.46 NG/ML (ref 0–4)

## 2018-01-09 DIAGNOSIS — M54.50 ACUTE BILATERAL LOW BACK PAIN WITHOUT SCIATICA: ICD-10-CM

## 2018-01-09 RX ORDER — CYCLOBENZAPRINE HCL 10 MG
TABLET ORAL
Qty: 30 TABLET | Refills: 0 | OUTPATIENT
Start: 2018-01-09

## 2018-02-05 ENCOUNTER — OFFICE VISIT (OUTPATIENT)
Dept: FAMILY MEDICINE CLINIC | Facility: CLINIC | Age: 73
End: 2018-02-05

## 2018-02-05 VITALS
WEIGHT: 205.6 LBS | HEIGHT: 70 IN | RESPIRATION RATE: 16 BRPM | SYSTOLIC BLOOD PRESSURE: 140 MMHG | HEART RATE: 108 BPM | TEMPERATURE: 98.7 F | BODY MASS INDEX: 29.43 KG/M2 | DIASTOLIC BLOOD PRESSURE: 80 MMHG

## 2018-02-05 DIAGNOSIS — H66.002 ACUTE SUPPURATIVE OTITIS MEDIA OF LEFT EAR WITHOUT SPONTANEOUS RUPTURE OF TYMPANIC MEMBRANE, RECURRENCE NOT SPECIFIED: ICD-10-CM

## 2018-02-05 DIAGNOSIS — R50.9 FEVER, UNSPECIFIED FEVER CAUSE: ICD-10-CM

## 2018-02-05 DIAGNOSIS — J10.1 INFLUENZA A: Primary | ICD-10-CM

## 2018-02-05 LAB
EXPIRATION DATE: ABNORMAL
FLUAV AG NPH QL: ABNORMAL
FLUBV AG NPH QL: ABNORMAL
INTERNAL CONTROL: ABNORMAL
Lab: ABNORMAL

## 2018-02-05 PROCEDURE — 87804 INFLUENZA ASSAY W/OPTIC: CPT | Performed by: FAMILY MEDICINE

## 2018-02-05 PROCEDURE — 99213 OFFICE O/P EST LOW 20 MIN: CPT | Performed by: FAMILY MEDICINE

## 2018-02-05 RX ORDER — OXCARBAZEPINE 300 MG/1
TABLET, FILM COATED ORAL
COMMUNITY
Start: 2018-01-22 | End: 2019-07-11 | Stop reason: SDUPTHER

## 2018-02-05 RX ORDER — DEXTROMETHORPHAN HYDROBROMIDE AND PROMETHAZINE HYDROCHLORIDE 15; 6.25 MG/5ML; MG/5ML
5 SYRUP ORAL 4 TIMES DAILY PRN
Qty: 180 ML | Refills: 0 | Status: SHIPPED | OUTPATIENT
Start: 2018-02-05 | End: 2018-02-22

## 2018-02-05 RX ORDER — TAMSULOSIN HYDROCHLORIDE 0.4 MG/1
CAPSULE ORAL
COMMUNITY
Start: 2018-01-04 | End: 2018-07-12

## 2018-02-05 RX ORDER — CEFDINIR 300 MG/1
300 CAPSULE ORAL 2 TIMES DAILY
Qty: 14 CAPSULE | Refills: 0 | Status: SHIPPED | OUTPATIENT
Start: 2018-02-05 | End: 2018-02-22

## 2018-02-05 NOTE — PROGRESS NOTES
Subjective   Antione Douglas is a 72 y.o. male.     History of Present Illness   Symptoms started 5 days ago with sore throat, ear pain, dry cough, body aches, headache. Symptoms have progressively gotten worse.  Denies nausea, vomiting, diarrhea.   Unsure of Tmax, but has been having fever and chills.   He has been treating with Bromfed cough syrup, however it is keeping him awake at night, would like a different cough syrup.     The following portions of the patient's history were reviewed and updated as appropriate: allergies, current medications, past family history, past medical history, past social history, past surgical history and problem list.    Review of Systems   Constitutional: Positive for chills and fever.   HENT: Positive for congestion, ear pain, postnasal drip and rhinorrhea. Negative for sinus pain and sinus pressure.    Respiratory: Positive for cough. Negative for shortness of breath and wheezing.    Cardiovascular: Negative for chest pain and palpitations.   Gastrointestinal: Negative for abdominal pain, diarrhea, nausea and vomiting.   Musculoskeletal: Positive for myalgias.   Neurological: Positive for headaches.   Hematological: Negative for adenopathy.   Psychiatric/Behavioral: Negative for confusion.       Objective   Physical Exam   Constitutional: He is oriented to person, place, and time. He appears well-developed and well-nourished.   HENT:   Head: Normocephalic and atraumatic.   Right Ear: Hearing, tympanic membrane, external ear and ear canal normal.   Left Ear: Hearing, external ear and ear canal normal. Tympanic membrane is erythematous. A middle ear effusion is present.   Nose: Nose normal.   Mouth/Throat: Uvula is midline and mucous membranes are normal. Oropharyngeal exudate present. No posterior oropharyngeal erythema.   Eyes: Conjunctivae and EOM are normal.   Neck: Normal range of motion. Neck supple.   Cardiovascular: Normal rate, regular rhythm and normal heart sounds.     Pulmonary/Chest: Effort normal and breath sounds normal. He has no wheezes. He has no rhonchi.   Musculoskeletal: He exhibits no deformity.   Lymphadenopathy:     He has no cervical adenopathy.   Neurological: He is alert and oriented to person, place, and time.   Skin: Skin is warm and dry.   Psychiatric: He has a normal mood and affect. His behavior is normal.   Nursing note and vitals reviewed.      Assessment/Plan   Antione was seen today for l ear pain, sore throat & fever.    Diagnoses and all orders for this visit:    Influenza A  -     promethazine-dextromethorphan (PROMETHAZINE-DM) 6.25-15 MG/5ML syrup; Take 5 mL by mouth 4 (Four) Times a Day As Needed for Cough.    Fever, unspecified fever cause  -     POCT Influenza A/B    Acute suppurative otitis media of left ear without spontaneous rupture of tympanic membrane, recurrence not specified  -     cefdinir (OMNICEF) 300 MG capsule; Take 1 capsule by mouth 2 (Two) Times a Day.      Influenza A positive  He is out of treatment window for Tamiflu. Continue prn cough syrup.   Omnicef to treat OM of left ear.

## 2018-02-05 NOTE — PATIENT INSTRUCTIONS
"Go to the nearest ER or return to clinic if symptoms worsen, fever/chill develop      Influenza, Adult  Influenza (“the flu\") is an infection in the lungs, nose, and throat (respiratory tract). It is caused by a virus. The flu causes many common cold symptoms, as well as a high fever and body aches. It can make you feel very sick.  The flu spreads easily from person to person (is contagious). Getting a flu shot (influenza vaccination) every year is the best way to prevent the flu.  Follow these instructions at home:  · Take over-the-counter and prescription medicines only as told by your doctor.  · Use a cool mist humidifier to add moisture (humidity) to the air in your home. This can make it easier to breathe.  · Rest as needed.  · Drink enough fluid to keep your pee (urine) clear or pale yellow.  · Cover your mouth and nose when you cough or sneeze.  · Wash your hands with soap and water often, especially after you cough or sneeze. If you cannot use soap and water, use hand .  · Stay home from work or school as told by your doctor. Unless you are visiting your doctor, try to avoid leaving home until your fever has been gone for 24 hours without the use of medicine.  · Keep all follow-up visits as told by your doctor. This is important.  How is this prevented?  · Getting a yearly (annual) flu shot is the best way to avoid getting the flu. You may get the flu shot in late summer, fall, or winter. Ask your doctor when you should get your flu shot.  · Wash your hands often or use hand  often.  · Avoid contact with people who are sick during cold and flu season.  · Eat healthy foods.  · Drink plenty of fluids.  · Get enough sleep.  · Exercise regularly.  Contact a doctor if:  · You get new symptoms.  · You have:  ¨ Chest pain.  ¨ Watery poop (diarrhea).  ¨ A fever.  · Your cough gets worse.  · You start to have more mucus.  · You feel sick to your stomach (nauseous).  · You throw up (vomit).  Get help " right away if:  · You start to be short of breath or have trouble breathing.  · Your skin or nails turn a bluish color.  · You have very bad pain or stiffness in your neck.  · You get a sudden headache.  · You get sudden pain in your face or ear.  · You cannot stop throwing up.  This information is not intended to replace advice given to you by your health care provider. Make sure you discuss any questions you have with your health care provider.  Document Released: 09/26/2009 Document Revised: 05/25/2017 Document Reviewed: 10/11/2016  Elsevier Interactive Patient Education © 2017 [employer] Inc.

## 2018-02-14 RX ORDER — AZITHROMYCIN 250 MG/1
TABLET, FILM COATED ORAL
Qty: 6 TABLET | Refills: 0 | Status: CANCELLED | OUTPATIENT
Start: 2018-02-14

## 2018-02-14 NOTE — TELEPHONE ENCOUNTER
----- Message from Ruthy Nielson sent at 2/14/2018  4:01 PM EST -----  Contact: FAMILIA; MED REQUEST   PT STILL HAS ALL SYMPTOMS OF THE FLU, HE WOULD LIKE TO KNOW IF SOMETHING COULD BE CALLED IN.     PHARMACY   [facility]       CALL BACK   [phone]

## 2018-02-14 NOTE — TELEPHONE ENCOUNTER
Pt still having sweats and chills and slight body aches, denies cough or chest congestion etc. . .

## 2018-02-15 NOTE — TELEPHONE ENCOUNTER
Doesn't sound like he needs additional antibiotic. Recommend OTC acetaminophen to improve symptoms. If any congestion, cough, sore throat, or any other symptom develops, then will add an antibiotic to treatment.

## 2018-02-22 ENCOUNTER — TELEPHONE (OUTPATIENT)
Dept: FAMILY MEDICINE CLINIC | Facility: CLINIC | Age: 73
End: 2018-02-22

## 2018-02-22 ENCOUNTER — OFFICE VISIT (OUTPATIENT)
Dept: FAMILY MEDICINE CLINIC | Facility: CLINIC | Age: 73
End: 2018-02-22

## 2018-02-22 VITALS
RESPIRATION RATE: 20 BRPM | WEIGHT: 205.4 LBS | HEART RATE: 100 BPM | BODY MASS INDEX: 29.41 KG/M2 | TEMPERATURE: 97.1 F | HEIGHT: 70 IN | SYSTOLIC BLOOD PRESSURE: 130 MMHG | DIASTOLIC BLOOD PRESSURE: 70 MMHG

## 2018-02-22 DIAGNOSIS — I10 ESSENTIAL HYPERTENSION: ICD-10-CM

## 2018-02-22 DIAGNOSIS — E11.9 TYPE 2 DIABETES MELLITUS WITHOUT COMPLICATION, WITHOUT LONG-TERM CURRENT USE OF INSULIN (HCC): Primary | ICD-10-CM

## 2018-02-22 DIAGNOSIS — F41.1 GENERALIZED ANXIETY DISORDER: ICD-10-CM

## 2018-02-22 DIAGNOSIS — R39.11 BENIGN PROSTATIC HYPERPLASIA WITH URINARY HESITANCY: ICD-10-CM

## 2018-02-22 DIAGNOSIS — E11.42 DIABETIC PERIPHERAL NEUROPATHY (HCC): ICD-10-CM

## 2018-02-22 DIAGNOSIS — R74.8 ABNORMAL LIVER ENZYMES: ICD-10-CM

## 2018-02-22 DIAGNOSIS — N40.1 BENIGN PROSTATIC HYPERPLASIA WITH URINARY HESITANCY: ICD-10-CM

## 2018-02-22 DIAGNOSIS — J43.1 PANLOBULAR EMPHYSEMA (HCC): ICD-10-CM

## 2018-02-22 DIAGNOSIS — F51.01 PRIMARY INSOMNIA: ICD-10-CM

## 2018-02-22 DIAGNOSIS — N18.30 CHRONIC KIDNEY DISEASE, STAGE 3 (HCC): ICD-10-CM

## 2018-02-22 DIAGNOSIS — Z71.85 VACCINE COUNSELING: ICD-10-CM

## 2018-02-22 LAB
ALBUMIN SERPL-MCNC: 4.1 G/DL (ref 3.2–4.8)
ALBUMIN/GLOB SERPL: 1.3 G/DL (ref 1.5–2.5)
ALP SERPL-CCNC: 88 U/L (ref 25–100)
ALT SERPL-CCNC: 22 U/L (ref 7–40)
AST SERPL-CCNC: 22 U/L (ref 0–33)
BILIRUB SERPL-MCNC: 0.4 MG/DL (ref 0.3–1.2)
BUN SERPL-MCNC: 14 MG/DL (ref 9–23)
BUN/CREAT SERPL: 10 (ref 7–25)
CALCIUM SERPL-MCNC: 9.3 MG/DL (ref 8.7–10.4)
CHLORIDE SERPL-SCNC: 100 MMOL/L (ref 99–109)
CHOLEST SERPL-MCNC: 171 MG/DL (ref 0–200)
CO2 SERPL-SCNC: 31 MMOL/L (ref 20–31)
CREAT SERPL-MCNC: 1.4 MG/DL (ref 0.6–1.3)
GFR SERPLBLD CREATININE-BSD FMLA CKD-EPI: 50 ML/MIN/1.73
GFR SERPLBLD CREATININE-BSD FMLA CKD-EPI: 61 ML/MIN/1.73
GLOBULIN SER CALC-MCNC: 3.1 GM/DL
GLUCOSE SERPL-MCNC: 142 MG/DL (ref 70–100)
HBA1C MFR BLD: 6.6 % (ref 4.8–5.6)
HDLC SERPL-MCNC: 54 MG/DL (ref 40–60)
LDLC SERPL CALC-MCNC: 103 MG/DL (ref 0–100)
LDLC/HDLC SERPL: 1.9 {RATIO}
POTASSIUM SERPL-SCNC: 3.9 MMOL/L (ref 3.5–5.5)
PROT SERPL-MCNC: 7.2 G/DL (ref 5.7–8.2)
SODIUM SERPL-SCNC: 141 MMOL/L (ref 132–146)
TRIGL SERPL-MCNC: 72 MG/DL (ref 0–150)
VLDLC SERPL CALC-MCNC: 14.4 MG/DL

## 2018-02-22 PROCEDURE — 99214 OFFICE O/P EST MOD 30 MIN: CPT | Performed by: FAMILY MEDICINE

## 2018-02-22 PROCEDURE — 90670 PCV13 VACCINE IM: CPT | Performed by: FAMILY MEDICINE

## 2018-02-22 PROCEDURE — G0008 ADMIN INFLUENZA VIRUS VAC: HCPCS | Performed by: FAMILY MEDICINE

## 2018-02-22 RX ORDER — AMITRIPTYLINE HYDROCHLORIDE 25 MG/1
25 TABLET, FILM COATED ORAL NIGHTLY
Qty: 30 TABLET | Refills: 5 | Status: SHIPPED | OUTPATIENT
Start: 2018-02-22 | End: 2018-03-27 | Stop reason: SINTOL

## 2018-02-22 NOTE — PROGRESS NOTES
Subjective    FU Diab Type 2, HTN, CKD, elevated LFT's, BPH, insomnia, anxiety, emphysema, neuropathy (started on Elavil last visit) however, pt does not remember this medication as does not think he is taking it & nocturia (started on Flomax last visit).   Pt very unclear about his medications today. Usually his wife sends an updated list. Unable to verify meds today, instructed pt to have his wife call us back later to update.    Antione Douglas is a 72 y.o. male.     History of Present Illness     Patient returns today for follow-up of chronic medical conditions as noted above.      Been about the same overall.  No real change in his neuropathy symptoms.  Struggles with his feet being cold all the time covers them up and then ends up sweating throughout the night.    Better urinating at this point; urology placed him on an anticholinergic for urgency.  Didn't tolerate it well.  Stopped the medication seems to be doing better overall.  Minimal nocturia.    Due for routine lab work on his diabetes hypertension chronic kidney disease and elevated LFTs.    Unsure of what refills he needs.    Does not have an accurate medicine list and states his wife, takes care of that.    The following portions of the patient's history were reviewed and updated as appropriate: allergies, current medications, past medical history and problem list.    Review of Systems   Constitutional: Negative for fever.   HENT: Negative.    Respiratory: Negative for cough.    Cardiovascular: Negative for chest pain and leg swelling.   Endocrine: Positive for cold intolerance.   Genitourinary: Positive for difficulty urinating (Better). Negative for dysuria and urgency.   Musculoskeletal: Positive for back pain (chronic).   Neurological: Negative for dizziness and headaches.   Psychiatric/Behavioral: Nervous/anxious: anxiety is stable.        Objective   Physical Exam   Constitutional: He appears well-developed and well-nourished.   Cardiovascular:  Normal rate.    Pulmonary/Chest: Effort normal and breath sounds normal. He has no wheezes.   Abdominal: Soft. Bowel sounds are normal.   Musculoskeletal: He exhibits no edema.    Antione had a diabetic foot exam performed today.  Neurological: He is alert.   Decreased sensation to LT both feet to lower leg, unchanged; trace DP pulses bilat   Psychiatric: He has a normal mood and affect. His behavior is normal.   Nursing note and vitals reviewed.      Assessment/Plan   Antione was seen today for follow-up, diabetes, hypertension, chronic kidney disease, elevated lft's, benign prostatic hypertrophy, peripheral neuropathy, nocturia, insomnia, anxiety and emphysema.    Diagnoses and all orders for this visit:    Type 2 diabetes mellitus without complication, without long-term current use of insulin  -     Lipid Panel With LDL / HDL Ratio  -     Hemoglobin A1c  -     MicroAlbumin, Urine, Random - Urine, Clean Catch    Diabetic peripheral neuropathy    Panlobular emphysema    Essential hypertension  -     Comprehensive Metabolic Panel  -     Lipid Panel With LDL / HDL Ratio    Chronic kidney disease, stage 3  -     Comprehensive Metabolic Panel    Abnormal liver enzymes    Primary insomnia    Benign prostatic hyperplasia with urinary hesitancy    Generalized anxiety disorder    Vaccine counseling  -     pneumococcal conj. 13-valent (PREVNAR-13) vaccine 0.5 mL; Inject 0.5 mL into the shoulder, thigh, or buttocks 1 (One) Time.      Colonoscopy report [facility] - will obtain to see when his last colonoscopy was and what recommended follow-up was.    Really looks good overall today.  Best testing in some time.  No medication changes however we need to know exactly what he has been taking and have asked him to have his wife give us a call so we can do medicine reconciliation

## 2018-02-22 NOTE — TELEPHONE ENCOUNTER
FYI: pt see's Dr. Brown for Pain Mgmt, Dr. Eason for urology, [place] dang? (wife not sure what type phys she is but controls his Cymbalta)      Pt's med list has now been updated. As pt noted at appt he is not taking Amitriptyline that was Rx'd for neuropathy. Should this be resent?

## 2018-02-22 NOTE — TELEPHONE ENCOUNTER
----- Message from Javier Terrell sent at 2/22/2018  1:43 PM EST -----  Contact: JEVON / KRIS PONCE CALLED AND IS REQUESTING  JEVON TO CALL AND GO OVER [facility] MEDICATION LIST.      KRIS MCMAHON [phone]

## 2018-02-26 ENCOUNTER — TELEPHONE (OUTPATIENT)
Dept: FAMILY MEDICINE CLINIC | Facility: CLINIC | Age: 73
End: 2018-02-26

## 2018-02-26 DIAGNOSIS — Z12.11 COLON CANCER SCREENING: Primary | ICD-10-CM

## 2018-02-26 DIAGNOSIS — Z12.11 SCREEN FOR COLON CANCER: Primary | ICD-10-CM

## 2018-02-26 DIAGNOSIS — E78.5 HYPERLIPIDEMIA, UNSPECIFIED HYPERLIPIDEMIA TYPE: ICD-10-CM

## 2018-02-26 RX ORDER — ATORVASTATIN CALCIUM 10 MG/1
10 TABLET, FILM COATED ORAL DAILY
Qty: 90 TABLET | Refills: 1 | Status: SHIPPED | OUTPATIENT
Start: 2018-02-26 | End: 2018-10-08 | Stop reason: SDUPTHER

## 2018-02-26 NOTE — TELEPHONE ENCOUNTER
----- Message from Jaime Dickey LPN sent at 2/26/2018  4:54 PM EST -----  Spoke with pt and went over msg/instructions. Verbalized understanding and agrees to test. Order placed and pending Dr Coronel's signature

## 2018-02-26 NOTE — TELEPHONE ENCOUNTER
----- Message from Holly Arreola sent at 2/26/2018  4:07 PM EST -----  Patient is returning call regarding lab results. Please call back at [phone].

## 2018-03-01 ENCOUNTER — OFFICE VISIT (OUTPATIENT)
Dept: FAMILY MEDICINE CLINIC | Facility: CLINIC | Age: 73
End: 2018-03-01

## 2018-03-01 ENCOUNTER — HOSPITAL ENCOUNTER (OUTPATIENT)
Dept: GENERAL RADIOLOGY | Facility: HOSPITAL | Age: 73
Discharge: HOME | End: 2018-03-01
Admitting: NURSE PRACTITIONER

## 2018-03-01 VITALS
OXYGEN SATURATION: 94 % | RESPIRATION RATE: 18 BRPM | BODY MASS INDEX: 29.92 KG/M2 | TEMPERATURE: 97.5 F | SYSTOLIC BLOOD PRESSURE: 124 MMHG | DIASTOLIC BLOOD PRESSURE: 68 MMHG | HEART RATE: 84 BPM | HEIGHT: 70 IN | WEIGHT: 209 LBS

## 2018-03-01 DIAGNOSIS — J43.1 PANLOBULAR EMPHYSEMA (HCC): ICD-10-CM

## 2018-03-01 DIAGNOSIS — E11.9 TYPE 2 DIABETES MELLITUS WITHOUT COMPLICATION, WITHOUT LONG-TERM CURRENT USE OF INSULIN (HCC): ICD-10-CM

## 2018-03-01 DIAGNOSIS — J18.9 WALKING PNEUMONIA: Primary | ICD-10-CM

## 2018-03-01 PROCEDURE — 99214 OFFICE O/P EST MOD 30 MIN: CPT | Performed by: NURSE PRACTITIONER

## 2018-03-01 PROCEDURE — 71046 X-RAY EXAM CHEST 2 VIEWS: CPT

## 2018-03-01 RX ORDER — AZITHROMYCIN 250 MG/1
TABLET, FILM COATED ORAL
Qty: 6 TABLET | Refills: 0 | Status: SHIPPED | OUTPATIENT
Start: 2018-03-01 | End: 2018-03-27

## 2018-03-01 RX ORDER — METHYLPREDNISOLONE 4 MG/1
TABLET ORAL
Qty: 21 TABLET | Refills: 0 | Status: SHIPPED | OUTPATIENT
Start: 2018-03-01 | End: 2018-03-27

## 2018-03-01 RX ORDER — BENZONATATE 200 MG/1
200 CAPSULE ORAL 3 TIMES DAILY PRN
Qty: 50 CAPSULE | Refills: 0 | Status: SHIPPED | OUTPATIENT
Start: 2018-03-01 | End: 2018-03-27

## 2018-03-01 NOTE — PATIENT INSTRUCTIONS
Community-Acquired Pneumonia, Adult  Pneumonia is an infection of the lungs. There are different types of pneumonia. One type can develop while a person is in a hospital. A different type, called community-acquired pneumonia, develops in people who are not, or have not recently been, in the hospital or other health care facility.  What are the causes?  Pneumonia may be caused by bacteria, viruses, or funguses. Community-acquired pneumonia is often caused by Streptococcus pneumonia bacteria. These bacteria are often passed from one person to another by breathing in droplets from the cough or sneeze of an infected person.  What increases the risk?  The condition is more likely to develop in:  · People who have chronic diseases, such as chronic obstructive pulmonary disease (COPD), asthma, congestive heart failure, cystic fibrosis, diabetes, or kidney disease.  · People who have early-stage or late-stage HIV.  · People who have sickle cell disease.  · People who have had their spleen removed (splenectomy).  · People who have poor dental hygiene.  · People who have medical conditions that increase the risk of breathing in (aspirating) secretions their own mouth and nose.  · People who have a weakened immune system (immunocompromised).  · People who smoke.  · People who travel to areas where pneumonia-causing germs commonly exist.  · People who are around animal habitats or animals that have pneumonia-causing germs, including birds, bats, rabbits, cats, and farm animals.  What are the signs or symptoms?  Symptoms of this condition include:  · A dry cough.  · A wet (productive) cough.  · Fever.  · Sweating.  · Chest pain, especially when breathing deeply or coughing.  · Rapid breathing or difficulty breathing.  · Shortness of breath.  · Shaking chills.  · Fatigue.  · Muscle aches.  How is this diagnosed?  Your health care provider will take a medical history and perform a physical exam. You may also have other tests,  including:  · Imaging studies of your chest, including X-rays.  · Tests to check your blood oxygen level and other blood gases.  · Other tests on blood, mucus (sputum), fluid around your lungs (pleural fluid), and urine.  If your pneumonia is severe, other tests may be done to identify the specific cause of your illness.  How is this treated?  The type of treatment that you receive depends on many factors, such as the cause of your pneumonia, the medicines you take, and other medical conditions that you have. For most adults, treatment and recovery from pneumonia may occur at home. In some cases, treatment must happen in a hospital. Treatment may include:  · Antibiotic medicines, if the pneumonia was caused by bacteria.  · Antiviral medicines, if the pneumonia was caused by a virus.  · Medicines that are given by mouth or through an IV tube.  · Oxygen.  · Respiratory therapy.  Although rare, treating severe pneumonia may include:  · Mechanical ventilation. This is done if you are not breathing well on your own and you cannot maintain a safe blood oxygen level.  · Thoracentesis. This procedure removes fluid around one lung or both lungs to help you breathe better.  Follow these instructions at home:  · Take over-the-counter and prescription medicines only as told by your health care provider.  ¨ Only take cough medicine if you are losing sleep. Understand that cough medicine can prevent your body’s natural ability to remove mucus from your lungs.  ¨ If you were prescribed an antibiotic medicine, take it as told by your health care provider. Do not stop taking the antibiotic even if you start to feel better.  · Sleep in a semi-upright position at night. Try sleeping in a reclining chair, or place a few pillows under your head.  · Do not use tobacco products, including cigarettes, chewing tobacco, and e-cigarettes. If you need help quitting, ask your health care provider.  · Drink enough water to keep your urine clear  or pale yellow. This will help to thin out mucus secretions in your lungs.  How is this prevented?  There are ways that you can decrease your risk of developing community-acquired pneumonia. Consider getting a pneumococcal vaccine if:  · You are older than 65 years of age.  · You are older than 19 years of age and are undergoing cancer treatment, have chronic lung disease, or have other medical conditions that affect your immune system. Ask your health care provider if this applies to you.  There are different types and schedules of pneumococcal vaccines. Ask your health care provider which vaccination option is best for you.  You may also prevent community-acquired pneumonia if you take these actions:  · Get an influenza vaccine every year. Ask your health care provider which type of influenza vaccine is best for you.  · Go to the dentist on a regular basis.  · Wash your hands often. Use hand  if soap and water are not available.  Contact a health care provider if:  · You have a fever.  · You are losing sleep because you cannot control your cough with cough medicine.  Get help right away if:  · You have worsening shortness of breath.  · You have increased chest pain.  · Your sickness becomes worse, especially if you are an older adult or have a weakened immune system.  · You cough up blood.  This information is not intended to replace advice given to you by your health care provider. Make sure you discuss any questions you have with your health care provider.  Document Released: 12/18/2006 Document Revised: 04/27/2017 Document Reviewed: 04/13/2016  [employer] Patient Education © 2017 [employer]

## 2018-03-01 NOTE — PROGRESS NOTES
Subjective   Antione Douglas is a 72 y.o. male.     History of Present Illness   Deep Cough and wheezing and night sweats for the past week or so  Left ear pain, feeling weak and slightly dizzy at times  Was diagnosed with Influenza A about a month ago  + hx of COPD/emphasema, remote hx of pneumonia, up to date on pneumonia vaccine   using Symbicort inhaler twice day  Productive cough with thick sputum, wheezing worse when he lays down, shortness of breath with exertion    DM stable on oral meds    The following portions of the patient's history were reviewed and updated as appropriate: allergies, current medications, past family history, past medical history, past social history, past surgical history and problem list.    Review of Systems   Constitutional: Positive for activity change and diaphoresis (night sweats ). Negative for appetite change.   HENT: Positive for congestion, ear pain and sore throat. Negative for rhinorrhea, sinus pain and sinus pressure.    Respiratory: Positive for cough, shortness of breath and wheezing.    Cardiovascular: Negative for chest pain, palpitations and leg swelling.   Gastrointestinal: Negative for nausea and vomiting.   Neurological: Positive for weakness and light-headedness. Negative for dizziness and headaches.   Psychiatric/Behavioral: Positive for sleep disturbance (due to cough).       Objective   Physical Exam   Constitutional: He is oriented to person, place, and time. He appears well-developed and well-nourished. No distress.   HENT:   Head: Normocephalic.   Right Ear: Tympanic membrane, external ear and ear canal normal.   Left Ear: Tympanic membrane, external ear and ear canal normal.   Nose: Mucosal edema and rhinorrhea present. Right sinus exhibits maxillary sinus tenderness and frontal sinus tenderness. Left sinus exhibits maxillary sinus tenderness and frontal sinus tenderness.   Mouth/Throat: Uvula is midline, oropharynx is clear and moist and mucous membranes are  normal. No oropharyngeal exudate, posterior oropharyngeal edema or posterior oropharyngeal erythema.   Eyes: Conjunctivae are normal.   Neck: Normal range of motion. Neck supple.   Cardiovascular: Normal rate, regular rhythm and normal heart sounds.    Pulmonary/Chest: Effort normal. No respiratory distress. He has no wheezes. He has rales (left upper lobe with rales ).   Lymphadenopathy:     He has no cervical adenopathy.   Neurological: He is alert and oriented to person, place, and time.   Skin: Skin is warm and dry. No rash noted.   Psychiatric: He has a normal mood and affect. His behavior is normal. Judgment and thought content normal.   Nursing note and vitals reviewed.      Assessment/Plan   Antione was seen today for sinus problem.    Diagnoses and all orders for this visit:    Walking pneumonia  -     XR Chest PA & Lateral    Other orders  -     azithromycin (ZITHROMAX) 250 MG tablet; Take 2 tablets the first day, then 1 tablet daily for 4 days.  -     MethylPREDNISolone (MEDROL, AIDE,) 4 MG tablet; Take as directed on package instructions.  -     benzonatate (TESSALON) 200 MG capsule; Take 1 capsule by mouth 3 (Three) Times a Day As Needed for Cough.    Will send pt for stat CXR  Will start pt on abx and steroids  Take Tessalon as needed for cough  Discussed with pt that if condition worsens he should go to ER  Called his wife Maggie to discuss with her that her should not be driving while he is dizzy and to discuss his treatment and dx.

## 2018-03-06 RX ORDER — AMOXICILLIN AND CLAVULANATE POTASSIUM 875; 125 MG/1; MG/1
1 TABLET, FILM COATED ORAL 2 TIMES DAILY
Qty: 14 TABLET | Refills: 0 | Status: SHIPPED | OUTPATIENT
Start: 2018-03-06 | End: 2018-03-27

## 2018-03-06 RX ORDER — IPRATROPIUM BROMIDE 42 UG/1
2 SPRAY, METERED NASAL 4 TIMES DAILY
Qty: 1 EACH | Refills: 3 | Status: SHIPPED | OUTPATIENT
Start: 2018-03-06 | End: 2021-02-10

## 2018-03-27 ENCOUNTER — OFFICE VISIT (OUTPATIENT)
Dept: FAMILY MEDICINE CLINIC | Facility: CLINIC | Age: 73
End: 2018-03-27

## 2018-03-27 VITALS
WEIGHT: 207 LBS | RESPIRATION RATE: 24 BRPM | SYSTOLIC BLOOD PRESSURE: 108 MMHG | DIASTOLIC BLOOD PRESSURE: 80 MMHG | HEIGHT: 70 IN | BODY MASS INDEX: 29.63 KG/M2 | HEART RATE: 88 BPM | TEMPERATURE: 96.3 F

## 2018-03-27 DIAGNOSIS — R68.89 FLU-LIKE SYMPTOMS: Primary | ICD-10-CM

## 2018-03-27 DIAGNOSIS — J06.9 PROTRACTED URI: ICD-10-CM

## 2018-03-27 LAB
EXPIRATION DATE: NORMAL
FLUAV AG NPH QL: NEGATIVE
FLUBV AG NPH QL: NEGATIVE
INTERNAL CONTROL: NORMAL
Lab: NORMAL

## 2018-03-27 PROCEDURE — 87804 INFLUENZA ASSAY W/OPTIC: CPT | Performed by: NURSE PRACTITIONER

## 2018-03-27 PROCEDURE — 99213 OFFICE O/P EST LOW 20 MIN: CPT | Performed by: NURSE PRACTITIONER

## 2018-03-27 RX ORDER — LEVOFLOXACIN 500 MG/1
500 TABLET, FILM COATED ORAL DAILY
Qty: 10 TABLET | Refills: 0 | Status: SHIPPED | OUTPATIENT
Start: 2018-03-27 | End: 2018-04-06

## 2018-03-27 NOTE — PROGRESS NOTES
Subjective   Antione Douglas is a 73 y.o. male.     History of Present Illness   Cough and chest congestion, wheezing at times, feels like he has the flu again  Wants to be checked for flu  HA, body aches., fever chills, runny nose and cough, night sweats  Was treated for pneumonia 3 weeks ago, took Zpak and steroids  Using inhalers    Skin issue   Has a new puppy, scratched on his hands 2 weeks ago  Break in the skin, bruising, mild swelling; seems to be healing      The following portions of the patient's history were reviewed and updated as appropriate: allergies, current medications, past family history, past medical history, past social history, past surgical history and problem list.    Review of Systems   Constitutional: Positive for chills, fatigue and fever.   HENT: Positive for congestion, postnasal drip and rhinorrhea.    Eyes: Negative.    Respiratory: Positive for cough and wheezing.    Cardiovascular: Negative.    Gastrointestinal: Negative.    Endocrine: Negative.    Skin: Positive for color change and bruise.   Neurological: Positive for headaches.   Hematological: Bruises/bleeds easily.       Objective   Physical Exam   Constitutional: He is oriented to person, place, and time. He appears well-developed and well-nourished.   HENT:   Head: Normocephalic.   Right Ear: External ear normal.   Left Ear: External ear normal.   Nose: Nose normal.   Mouth/Throat: Oropharynx is clear and moist.   Cardiovascular: Normal rate, regular rhythm and normal heart sounds.    Pulmonary/Chest: Effort normal. He has wheezes.   Musculoskeletal: Normal range of motion.   Neurological: He is alert and oriented to person, place, and time.   Skin: Skin is warm. There is erythema.   Nursing note and vitals reviewed.        Assessment/Plan   Antione was seen today for skin problem.    Diagnoses and all orders for this visit:    Flu-like symptoms  -     POCT Influenza A/B    Protracted URI  -     levoFLOXacin (LEVAQUIN) 500 MG  tablet; Take 1 tablet by mouth Daily.    Influenza A&B negative pt informed  Will treat pt with another round of antibiotics  Continue inhalers  Keep skin clean and dry until healed  F/U if sx do not improve or worsen, pt agrees

## 2018-04-06 ENCOUNTER — HOSPITAL ENCOUNTER (OUTPATIENT)
Dept: CT IMAGING | Facility: HOSPITAL | Age: 73
Discharge: HOME | End: 2018-04-06
Admitting: NURSE PRACTITIONER

## 2018-04-06 ENCOUNTER — OFFICE VISIT (OUTPATIENT)
Dept: FAMILY MEDICINE CLINIC | Facility: CLINIC | Age: 73
End: 2018-04-06

## 2018-04-06 VITALS
DIASTOLIC BLOOD PRESSURE: 80 MMHG | SYSTOLIC BLOOD PRESSURE: 105 MMHG | HEIGHT: 70 IN | BODY MASS INDEX: 30.06 KG/M2 | WEIGHT: 210 LBS | OXYGEN SATURATION: 94 % | RESPIRATION RATE: 20 BRPM | HEART RATE: 88 BPM | TEMPERATURE: 97.6 F

## 2018-04-06 DIAGNOSIS — N18.30 CHRONIC KIDNEY DISEASE, STAGE 3 (HCC): ICD-10-CM

## 2018-04-06 DIAGNOSIS — R61 UNEXPLAINED NIGHT SWEATS: ICD-10-CM

## 2018-04-06 DIAGNOSIS — T14.8XXA BRUISING: ICD-10-CM

## 2018-04-06 DIAGNOSIS — R06.02 SHORTNESS OF BREATH: Primary | ICD-10-CM

## 2018-04-06 DIAGNOSIS — R06.09 DOE (DYSPNEA ON EXERTION): Primary | ICD-10-CM

## 2018-04-06 DIAGNOSIS — R06.09 DOE (DYSPNEA ON EXERTION): ICD-10-CM

## 2018-04-06 DIAGNOSIS — E11.9 TYPE 2 DIABETES MELLITUS WITHOUT COMPLICATION, WITHOUT LONG-TERM CURRENT USE OF INSULIN (HCC): ICD-10-CM

## 2018-04-06 DIAGNOSIS — R06.02 SHORTNESS OF BREATH: ICD-10-CM

## 2018-04-06 PROCEDURE — 99214 OFFICE O/P EST MOD 30 MIN: CPT | Performed by: NURSE PRACTITIONER

## 2018-04-06 PROCEDURE — 71250 CT THORAX DX C-: CPT

## 2018-04-06 PROCEDURE — 93000 ELECTROCARDIOGRAM COMPLETE: CPT | Performed by: NURSE PRACTITIONER

## 2018-04-06 NOTE — PROGRESS NOTES
Subjective   Antione Douglas is a 73 y.o. male.     History of Present Illness   SOA with exertion night sweats, fever and chills, breaking out into sweat during day  Fatigue and dizziness, DILL  No wheezing or tightness in chest, no back pain, no urinary problems or abdominal pain  Just finished antibiotics but did not really make a difference  Has been feeling poorly for nearly 2 months  XR of chest no pneumonia  No CP but DILL, SOA, no wheezing, no ankle edema, no cough  + hx of CHF and renal impairment, DMT2  Accompanied by his wife     The following portions of the patient's history were reviewed and updated as appropriate: allergies, current medications, past family history, past medical history, past social history, past surgical history and problem list.    Review of Systems   Constitutional: Positive for activity change, chills, diaphoresis, fatigue and fever. Negative for unexpected weight gain.   HENT: Negative for congestion.    Respiratory: Positive for cough and shortness of breath. Negative for chest tightness and wheezing.    Cardiovascular: Negative for chest pain, palpitations and leg swelling.   Gastrointestinal: Negative.    Endocrine: Negative.    Genitourinary: Negative.    Musculoskeletal: Negative for back pain.   Skin: Negative.    Allergic/Immunologic: Negative.    Neurological: Positive for dizziness. Negative for facial asymmetry, light-headedness, headaches and confusion.   Hematological: Bruises/bleeds easily.   Psychiatric/Behavioral: The patient is nervous/anxious.        Objective   Physical Exam   Constitutional: He is oriented to person, place, and time. He appears well-developed and well-nourished.   HENT:   Head: Normocephalic.   Right Ear: External ear normal.   Left Ear: External ear normal.   Nose: Nose normal.   Neck: Neck supple. No JVD present. No thyromegaly present.   Cardiovascular: Normal rate, normal heart sounds and intact distal pulses.    Pulmonary/Chest: Effort normal  and breath sounds normal. No respiratory distress. He has no wheezes. He has no rales.   Abdominal: Soft. Bowel sounds are normal.   Musculoskeletal: Normal range of motion. He exhibits no edema.   Lymphadenopathy:     He has no cervical adenopathy.   Neurological: He is alert and oriented to person, place, and time.   Skin: Skin is warm. Capillary refill takes 2 to 3 seconds. He is diaphoretic.   Psychiatric: He has a normal mood and affect. His behavior is normal. Judgment and thought content normal.   Nursing note and vitals reviewed.      ECG 12 Lead  Date/Time: 4/6/2018 5:49 PM  Performed by: NATASHA VAZQUEZ  Authorized by: NATASHA VAZQUEZ   Comparison: compared with previous ECG   Similar to previous ECG  Rhythm: sinus rhythm  Ectopy: PVCs  Rate: normal  BPM: 88  Conduction: conduction normal  ST Segments: ST segments normal  T Waves: T waves normal  QRS axis: normal  Clinical impression: abnormal ECG            Assessment/Plan   Antione was seen today for sweats & chills.    Diagnoses and all orders for this visit:    Shortness of breath  -     CT Chest Without Contrast  -     CBC & Differential  -     BNP  -     Basic Metabolic Panel    DILL (dyspnea on exertion)  -     CT Chest Without Contrast  -     BNP  -     D-dimer, Quantitative    Unexplained night sweats  -     CT Chest Without Contrast  -     CBC & Differential    Bruising  -     CBC & Differential  -     Protime-INR    Chronic kidney disease, stage 3  -     Basic Metabolic Panel    Type 2 diabetes mellitus without complication, without long-term current use of insulin  -     Basic Metabolic Panel    Other orders  -     ECG 12 Lead    CT scan of chest shows no evidence of pleural effusion, or adenopathy or pericardial effusion, no evidence of pneumonia or interstitial disease process, bony structures intact.   Since CT negative will do ECHO   Will check labs and notify pt of results  Advised pt and his wife if worsening sx go to ER,   can take Tylenol for  fever/chills.

## 2018-04-07 LAB
BASOPHILS # BLD AUTO: 0.1 X10E3/UL (ref 0–0.2)
BASOPHILS NFR BLD AUTO: 1 %
BNP SERPL-MCNC: 22.2 PG/ML (ref 0–100)
BUN SERPL-MCNC: 16 MG/DL (ref 8–27)
BUN/CREAT SERPL: 10 (ref 10–24)
CALCIUM SERPL-MCNC: 9.8 MG/DL (ref 8.6–10.2)
CHLORIDE SERPL-SCNC: 96 MMOL/L (ref 96–106)
CO2 SERPL-SCNC: 25 MMOL/L (ref 18–29)
CREAT SERPL-MCNC: 1.6 MG/DL (ref 0.76–1.27)
D DIMER PPP FEU-MCNC: 0.36 MG/L FEU (ref 0–0.49)
EOSINOPHIL # BLD AUTO: 0.2 X10E3/UL (ref 0–0.4)
EOSINOPHIL NFR BLD AUTO: 2 %
ERYTHROCYTE [DISTWIDTH] IN BLOOD BY AUTOMATED COUNT: 16.4 % (ref 12.3–15.4)
GFR SERPLBLD CREATININE-BSD FMLA CKD-EPI: 42 ML/MIN/1.73
GFR SERPLBLD CREATININE-BSD FMLA CKD-EPI: 49 ML/MIN/1.73
GLUCOSE SERPL-MCNC: 140 MG/DL (ref 65–99)
HCT VFR BLD AUTO: 45.9 % (ref 37.5–51)
HGB BLD-MCNC: 15.5 G/DL (ref 13–17.7)
IMM GRANULOCYTES # BLD: 0 X10E3/UL (ref 0–0.1)
IMM GRANULOCYTES NFR BLD: 0 %
INR PPP: 1 (ref 0.8–1.2)
LYMPHOCYTES # BLD AUTO: 2.9 X10E3/UL (ref 0.7–3.1)
LYMPHOCYTES NFR BLD AUTO: 20 %
MCH RBC QN AUTO: 32.4 PG (ref 26.6–33)
MCHC RBC AUTO-ENTMCNC: 33.8 G/DL (ref 31.5–35.7)
MCV RBC AUTO: 96 FL (ref 79–97)
MONOCYTES # BLD AUTO: 1.2 X10E3/UL (ref 0.1–0.9)
MONOCYTES NFR BLD AUTO: 8 %
NEUTROPHILS # BLD AUTO: 10.1 X10E3/UL (ref 1.4–7)
NEUTROPHILS NFR BLD AUTO: 69 %
PLATELET # BLD AUTO: 326 X10E3/UL (ref 150–379)
POTASSIUM SERPL-SCNC: 4.9 MMOL/L (ref 3.5–5.2)
PROTHROMBIN TIME: 10.4 SEC (ref 9.1–12)
RBC # BLD AUTO: 4.79 X10E6/UL (ref 4.14–5.8)
SODIUM SERPL-SCNC: 141 MMOL/L (ref 134–144)
WBC # BLD AUTO: 14.6 X10E3/UL (ref 3.4–10.8)

## 2018-04-10 NOTE — PROGRESS NOTES
"Subjective   Antione Douglas is a 73 y.o. male.     FU on sweats and chills (sched for colonscopy next week)   Shortness of Breath (sched for ECHO on 5-16-18)   Review labs & repeat    RF: Furosemide       History of Present Illness     Seen 3/1/18 and dx with walking pneumonia given azithromycin and medrol dosepack - CXR was without focal pneumonia finding    Seen again 3/27/18 getting worse - still felt like had flu and dx with protracted URI and given Levaquin (flu negative) - did not seem to make a difference and her returned to clinic 4/6/18. CT of chest showed nothing in lungs contributing to persistent SOA, night sweats, fever / chills    Echo being scheduled     Noted on CT scan -  \"compound calyx of the included left upper renal  pole or less likely mild dilatation of the left renal collecting system.  Only the upper pole is included on this exam\" - BNP normal    Renal function a bit worse;    Lab Results   Component Value Date    WBC 14.6 (H) 04/06/2018    HGB 15.5 04/06/2018    HCT 45.9 04/06/2018    MCV 96 04/06/2018     04/06/2018     The following portions of the patient's history were reviewed and updated as appropriate: allergies, current medications, past medical history and problem list.    Review of Systems   Constitutional: Positive for chills, diaphoresis and fever. Negative for unexpected weight loss.   Respiratory: Positive for shortness of breath and wheezing.    Cardiovascular: Negative.    Gastrointestinal: Negative.    Neurological: Negative.        Objective   Physical Exam   Constitutional: He is oriented to person, place, and time. He appears well-developed and well-nourished. No distress.   HENT:   Right Ear: External ear normal.   Left Ear: External ear normal.   Mouth/Throat: Oropharynx is clear and moist.   Eyes: Conjunctivae are normal.   Neck: Neck supple.   Cardiovascular: Normal rate and regular rhythm.    No murmur heard.  Pulmonary/Chest: Effort normal and breath sounds " normal. He has no wheezes.   Abdominal: Soft. There is no tenderness.   Neurological: He is alert and oriented to person, place, and time.   Skin: Skin is warm and dry. No rash noted.   Psychiatric: He has a normal mood and affect.   Nursing note and vitals reviewed.        Assessment/Plan   Antione was seen today for fu on sweats and chills, shortness of breath, review labs & repeat and rf: furosemide.    Diagnoses and all orders for this visit:    Night sweats  -     CBC & Differential  -     Comprehensive Metabolic Panel    Leukocytosis, unspecified type  -     CBC & Differential    Benign prostatic hyperplasia with urinary hesitancy    Essential hypertension    Chronic kidney disease, stage 3  -     Comprehensive Metabolic Panel    Acute prostatitis  -     POC Urinalysis Dipstick, Automated    Other orders  -     ciprofloxacin (CIPRO) 500 MG tablet; Take 1 tablet by mouth Every 12 (Twelve) Hours.  -     omeprazole (priLOSEC) 20 MG capsule; Take 1 capsule by mouth Daily.      Call Jenaro's office and see if can see Antione in next 3-4 weeks to f/u today and look at his CT scan.     I think he has been struggling with prostatitis - Cipro for 2 weeks; may need longer - probiotic for 6-8 weeks as well.    Keep colonoscopy plan for next week and echo in May

## 2018-04-11 ENCOUNTER — OFFICE VISIT (OUTPATIENT)
Dept: FAMILY MEDICINE CLINIC | Facility: CLINIC | Age: 73
End: 2018-04-11

## 2018-04-11 VITALS
DIASTOLIC BLOOD PRESSURE: 80 MMHG | BODY MASS INDEX: 30.49 KG/M2 | WEIGHT: 213 LBS | RESPIRATION RATE: 16 BRPM | TEMPERATURE: 98.1 F | SYSTOLIC BLOOD PRESSURE: 110 MMHG | HEIGHT: 70 IN | HEART RATE: 112 BPM

## 2018-04-11 DIAGNOSIS — N18.30 CHRONIC KIDNEY DISEASE, STAGE 3 (HCC): ICD-10-CM

## 2018-04-11 DIAGNOSIS — D72.829 LEUKOCYTOSIS, UNSPECIFIED TYPE: ICD-10-CM

## 2018-04-11 DIAGNOSIS — R61 NIGHT SWEATS: Primary | ICD-10-CM

## 2018-04-11 DIAGNOSIS — N40.1 BENIGN PROSTATIC HYPERPLASIA WITH URINARY HESITANCY: ICD-10-CM

## 2018-04-11 DIAGNOSIS — R39.11 BENIGN PROSTATIC HYPERPLASIA WITH URINARY HESITANCY: ICD-10-CM

## 2018-04-11 DIAGNOSIS — N41.0 ACUTE PROSTATITIS: ICD-10-CM

## 2018-04-11 DIAGNOSIS — I10 ESSENTIAL HYPERTENSION: ICD-10-CM

## 2018-04-11 LAB
ALBUMIN SERPL-MCNC: 4 G/DL (ref 3.2–4.8)
ALBUMIN/GLOB SERPL: 1.3 G/DL (ref 1.5–2.5)
ALP SERPL-CCNC: 76 U/L (ref 25–100)
ALT SERPL-CCNC: 23 U/L (ref 7–40)
AST SERPL-CCNC: 23 U/L (ref 0–33)
BASOPHILS # BLD AUTO: 0.05 10*3/MM3 (ref 0–0.2)
BASOPHILS NFR BLD AUTO: 0.4 % (ref 0–1)
BILIRUB BLD-MCNC: NEGATIVE MG/DL
BILIRUB SERPL-MCNC: 0.3 MG/DL (ref 0.3–1.2)
BUN SERPL-MCNC: 17 MG/DL (ref 9–23)
BUN/CREAT SERPL: 12.1 (ref 7–25)
CALCIUM SERPL-MCNC: 8.9 MG/DL (ref 8.7–10.4)
CHLORIDE SERPL-SCNC: 104 MMOL/L (ref 99–109)
CLARITY, POC: CLEAR
CO2 SERPL-SCNC: 29 MMOL/L (ref 20–31)
COLOR UR: YELLOW
CREAT SERPL-MCNC: 1.4 MG/DL (ref 0.6–1.3)
EOSINOPHIL # BLD AUTO: 0.19 10*3/MM3 (ref 0–0.3)
EOSINOPHIL NFR BLD AUTO: 1.4 % (ref 0–3)
ERYTHROCYTE [DISTWIDTH] IN BLOOD BY AUTOMATED COUNT: 16.6 % (ref 11.3–14.5)
GFR SERPLBLD CREATININE-BSD FMLA CKD-EPI: 50 ML/MIN/1.73
GFR SERPLBLD CREATININE-BSD FMLA CKD-EPI: 60 ML/MIN/1.73
GLOBULIN SER CALC-MCNC: 3.1 GM/DL
GLUCOSE SERPL-MCNC: 134 MG/DL (ref 70–100)
GLUCOSE UR STRIP-MCNC: NEGATIVE MG/DL
HCT VFR BLD AUTO: 45.3 % (ref 38.9–50.9)
HGB BLD-MCNC: 14.4 G/DL (ref 13.1–17.5)
IMM GRANULOCYTES # BLD: 0.1 10*3/MM3 (ref 0–0.03)
IMM GRANULOCYTES NFR BLD: 0.7 % (ref 0–0.6)
KETONES UR QL: NEGATIVE
LEUKOCYTE EST, POC: ABNORMAL
LYMPHOCYTES # BLD AUTO: 2.37 10*3/MM3 (ref 0.6–4.8)
LYMPHOCYTES NFR BLD AUTO: 17.2 % (ref 24–44)
MCH RBC QN AUTO: 31.5 PG (ref 27–31)
MCHC RBC AUTO-ENTMCNC: 31.8 G/DL (ref 32–36)
MCV RBC AUTO: 99.1 FL (ref 80–99)
MONOCYTES # BLD AUTO: 1.04 10*3/MM3 (ref 0–1)
MONOCYTES NFR BLD AUTO: 7.6 % (ref 0–12)
NEUTROPHILS # BLD AUTO: 10.01 10*3/MM3 (ref 1.5–8.3)
NEUTROPHILS NFR BLD AUTO: 72.7 % (ref 41–71)
NITRITE UR-MCNC: NEGATIVE MG/ML
PH UR: 6 [PH] (ref 5–8)
PLATELET # BLD AUTO: 287 10*3/MM3 (ref 150–450)
POTASSIUM SERPL-SCNC: 4.3 MMOL/L (ref 3.5–5.5)
PROT SERPL-MCNC: 7.1 G/DL (ref 5.7–8.2)
PROT UR STRIP-MCNC: ABNORMAL MG/DL
RBC # BLD AUTO: 4.57 10*6/MM3 (ref 4.2–5.76)
RBC # UR STRIP: NEGATIVE /UL
SODIUM SERPL-SCNC: 143 MMOL/L (ref 132–146)
SP GR UR: 1.01 (ref 1–1.03)
UROBILINOGEN UR QL: ABNORMAL
WBC # BLD AUTO: 13.76 10*3/MM3 (ref 3.5–10.8)

## 2018-04-11 PROCEDURE — 99214 OFFICE O/P EST MOD 30 MIN: CPT | Performed by: FAMILY MEDICINE

## 2018-04-11 PROCEDURE — 81003 URINALYSIS AUTO W/O SCOPE: CPT | Performed by: FAMILY MEDICINE

## 2018-04-11 RX ORDER — OMEPRAZOLE 20 MG/1
20 CAPSULE, DELAYED RELEASE ORAL DAILY
Qty: 90 CAPSULE | Refills: 3 | Status: SHIPPED | OUTPATIENT
Start: 2018-04-11 | End: 2019-03-18 | Stop reason: SDUPTHER

## 2018-04-11 RX ORDER — CIPROFLOXACIN 500 MG/1
500 TABLET, FILM COATED ORAL EVERY 12 HOURS SCHEDULED
Qty: 28 TABLET | Refills: 0 | Status: SHIPPED | OUTPATIENT
Start: 2018-04-11 | End: 2018-05-22

## 2018-04-12 ENCOUNTER — TELEPHONE (OUTPATIENT)
Dept: FAMILY MEDICINE CLINIC | Facility: CLINIC | Age: 73
End: 2018-04-12

## 2018-04-12 NOTE — TELEPHONE ENCOUNTER
You wanted pt to be seen by Dr. Eason in the near future. Spoke w/ Lola [phone] at their office and they sched him for 4-23-18 @ 3:30pm.  I called and informed pt.

## 2018-04-22 DIAGNOSIS — F41.1 GENERALIZED ANXIETY DISORDER: ICD-10-CM

## 2018-04-23 RX ORDER — ALPRAZOLAM 1 MG/1
TABLET ORAL
Qty: 90 TABLET | Refills: 4 | Status: SHIPPED | OUTPATIENT
Start: 2018-04-23 | End: 2018-11-25 | Stop reason: SDUPTHER

## 2018-04-24 ENCOUNTER — OFFICE VISIT (OUTPATIENT)
Dept: FAMILY MEDICINE CLINIC | Facility: CLINIC | Age: 73
End: 2018-04-24

## 2018-04-24 DIAGNOSIS — E11.9 TYPE 2 DIABETES MELLITUS WITHOUT COMPLICATION, WITHOUT LONG-TERM CURRENT USE OF INSULIN (HCC): Primary | ICD-10-CM

## 2018-04-24 DIAGNOSIS — F51.01 PRIMARY INSOMNIA: ICD-10-CM

## 2018-04-24 DIAGNOSIS — R29.6 FREQUENT FALLS: ICD-10-CM

## 2018-04-24 DIAGNOSIS — R79.89 ABNORMAL CBC: ICD-10-CM

## 2018-04-24 DIAGNOSIS — I10 ESSENTIAL HYPERTENSION: ICD-10-CM

## 2018-04-24 PROCEDURE — 96160 PT-FOCUSED HLTH RISK ASSMT: CPT | Performed by: NURSE PRACTITIONER

## 2018-04-24 PROCEDURE — G0439 PPPS, SUBSEQ VISIT: HCPCS | Performed by: NURSE PRACTITIONER

## 2018-04-24 PROCEDURE — 99397 PER PM REEVAL EST PAT 65+ YR: CPT | Performed by: NURSE PRACTITIONER

## 2018-04-24 NOTE — PROGRESS NOTES
QUICK REFERENCE INFORMATION:  The ABCs of the Annual Wellness Visit    Subsequent Medicare Wellness Visit    HEALTH RISK ASSESSMENT    1945    Recent Hospitalizations:  No hospitalization(s) within the last year..    Current Medical Providers:  Patient Care Team:  Vahe Coronle MD as PCP - General  Vahe Coronel MD as PCP - Family Medicine      Smoking Status:  History   Smoking Status   • Former Smoker   Smokeless Tobacco   • Never Used     Comment: quit 10 years ago       Alcohol Consumption:  History   Alcohol Use   • Yes     Comment: rare       Depression Screen:   No flowsheet data found.    Health Habits and Functional and Cognitive Screening:  No flowsheet data found.        Does the patient have evidence of cognitive impairment? No    Aspirin use counseling: Does not need ASA (and currently is not on it)      Recent Lab Results:  CMP:  Lab Results   Component Value Date     (H) 04/11/2018    BUN 17 04/11/2018    CREATININE 1.40 (H) 04/11/2018    EGFRIFNONA 50 (L) 04/11/2018    EGFRIFAFRI 60 (L) 04/11/2018    BCR 12.1 04/11/2018     04/11/2018    K 4.3 04/11/2018    CO2 29.0 04/11/2018    CALCIUM 8.9 04/11/2018    PROTENTOTREF 7.1 04/11/2018    ALBUMIN 4.00 04/11/2018    LABGLOBREF 3.1 04/11/2018    LABIL2 1.3 (L) 04/11/2018    BILITOT 0.3 04/11/2018    ALKPHOS 76 04/11/2018    AST 23 04/11/2018    ALT 23 04/11/2018     Lipid Panel:  Lab Results   Component Value Date    TRIG 72 02/22/2018    HDL 54 02/22/2018    VLDL 14.4 02/22/2018    LDLHDL 1.90 02/22/2018     HbA1c:  Lab Results   Component Value Date    HGBA1C 6.60 (H) 02/22/2018       Visual Acuity:  No exam data present    Age-appropriate Screening Schedule:  Refer to the list below for future screening recommendations based on patient's age, sex and/or medical conditions. Orders for these recommended tests are listed in the plan section. The patient has been provided with a written plan.    Health Maintenance   Topic Date Due   •  URINE MICROALBUMIN  1945   • TDAP/TD VACCINES (1 - Tdap) 03/06/1965   • ZOSTER VACCINE  05/02/2016   • INFLUENZA VACCINE  08/01/2018   • HEMOGLOBIN A1C  08/22/2018   • DIABETIC EYE EXAM  10/12/2018   • PNEUMOCOCCAL VACCINES (65+ LOW/MEDIUM RISK) (2 of 2 - PPSV23) 02/22/2019   • DIABETIC FOOT EXAM  02/22/2019   • LIPID PANEL  02/22/2019   • COLONOSCOPY  04/12/2021        Subjective   History of Present Illness    Antione Douglas is a 73 y.o. male who presents for an Subsequent Wellness Visit.    The following portions of the patient's history were reviewed and updated as appropriate: allergies, current medications, past family history, past medical history, past social history, past surgical history and problem list.    Outpatient Medications Prior to Visit   Medication Sig Dispense Refill   • albuterol (PROVENTIL HFA;VENTOLIN HFA) 108 (90 BASE) MCG/ACT inhaler ProAir  (90 Base) MCG/ACT Inhalation Aerosol Solution; Patient Sig: ProAir  (90 Base) MCG/ACT Inhalation Aerosol Solution ; 0; 08-Aug-2013; Active     • ALPRAZolam (XANAX) 1 MG tablet TAKE ONE TABLET BY MOUTH THREE TIMES A DAY AS NEEDED FOR ANXIETY 90 tablet 4   • atorvastatin (LIPITOR) 10 MG tablet Take 1 tablet by mouth Daily. 90 tablet 1   • budesonide-formoterol (SYMBICORT) 160-4.5 MCG/ACT inhaler Inhale 2 puffs 2 (two) times a day.     • ciprofloxacin (CIPRO) 500 MG tablet Take 1 tablet by mouth Every 12 (Twelve) Hours. 28 tablet 0   • DULoxetine (CYMBALTA) 60 MG capsule      • furosemide (LASIX) 20 MG tablet 2 po q am and one po q pm 270 tablet 1   • glucose blood (BRODERICK CONTOUR TEST) test strip Test once daily  DX E11.8 100 each 1   • HYDROcodone-acetaminophen (NORCO)  MG per tablet      • ipratropium (ATROVENT) 0.06 % nasal spray 2 sprays into each nostril 4 (Four) Times a Day. 1 each 3   • omeprazole (priLOSEC) 20 MG capsule Take 1 capsule by mouth Daily. 90 capsule 3   • OXcarbazepine (TRILEPTAL) 300 MG tablet      •  SITagliptin (JANUVIA) 100 MG tablet Take 1 tablet by mouth Daily. (Patient taking differently: Take 50 mg by mouth Daily.) 28 tablet 0   • tamsulosin (FLOMAX) 0.4 MG capsule 24 hr capsule        No facility-administered medications prior to visit.        Patient Active Problem List   Diagnosis   • Atopic rhinitis   • Chronic obstructive pulmonary disease   • Type 2 diabetes mellitus without complication, without long-term current use of insulin   • Abnormal liver enzymes   • Generalized anxiety disorder   • Essential hypertension   • Primary insomnia   • Arthralgia of hip   • Low back pain   • Cobalamin deficiency   • Asymptomatic PVCs   • Chronic idiopathic constipation   • Chronic kidney disease, stage 3   • Diabetic peripheral neuropathy   • Benign prostatic hyperplasia with urinary hesitancy       Advance Care Planning:  has an advance directive - a copy HAS NOT been provided    Identification of Risk Factors:  Risk factors include: increased fall risk.    Review of Systems   Constitutional: Positive for activity change.   HENT: Negative.    Eyes: Negative.    Respiratory: Negative.    Cardiovascular: Negative.    Gastrointestinal: Negative.    Endocrine: Negative.    Genitourinary: Negative.    Musculoskeletal: Positive for arthralgias, back pain, gait problem and myalgias.   Skin: Positive for wound (skin tear).   Allergic/Immunologic: Negative.    Neurological: Positive for light-headedness. Negative for syncope and weakness.   Hematological: Bruises/bleeds easily.   Psychiatric/Behavioral: Negative.        Compared to one year ago, the patient feels his physical health is the same.  Compared to one year ago, the patient feels his mental health is the same.    Objective     Physical Exam   Constitutional: He is oriented to person, place, and time. He appears well-developed and well-nourished.   HENT:   Head: Normocephalic.   Nose: Nose normal.   Neck: Neck supple. No JVD present. No thyromegaly present.  "  Cardiovascular: Normal rate, regular rhythm and normal heart sounds.    Pulmonary/Chest: Effort normal and breath sounds normal.   Abdominal: Soft.   Musculoskeletal: He exhibits no edema.   Neurological: He is alert and oriented to person, place, and time.   Skin: Skin is warm and dry. There is erythema (brusing and skin tear to left hand and right forearm ).   Psychiatric: He has a normal mood and affect. His behavior is normal. Judgment and thought content normal.   Nursing note and vitals reviewed.      Vitals:    04/24/18 1406   BP: 114/78   Pulse: 88   Resp: 20   Temp: 96.4 °F (35.8 °C)   Weight: 97 kg (213 lb 12.8 oz)   Height: 175.9 cm (69.25\")       Patient's Body mass index is 31.35 kg/m². BMI is above normal parameters. Follow-up plan includes:  deferred at this time.      Assessment/Plan   Patient Self-Management and Personalized Health Advice  The patient has been provided with information about: fall prevention and preventive services including:   · Fall Risk assessment done, TdaP vaccine, Zostavax vaccine (Herpes Zoster).    Visit Diagnoses:  No diagnosis found.    No orders of the defined types were placed in this encounter.      Outpatient Encounter Prescriptions as of 4/24/2018   Medication Sig Dispense Refill   • albuterol (PROVENTIL HFA;VENTOLIN HFA) 108 (90 BASE) MCG/ACT inhaler ProAir  (90 Base) MCG/ACT Inhalation Aerosol Solution; Patient Sig: ProAir  (90 Base) MCG/ACT Inhalation Aerosol Solution ; 0; 08-Aug-2013; Active     • ALPRAZolam (XANAX) 1 MG tablet TAKE ONE TABLET BY MOUTH THREE TIMES A DAY AS NEEDED FOR ANXIETY 90 tablet 4   • atorvastatin (LIPITOR) 10 MG tablet Take 1 tablet by mouth Daily. 90 tablet 1   • budesonide-formoterol (SYMBICORT) 160-4.5 MCG/ACT inhaler Inhale 2 puffs 2 (two) times a day.     • ciprofloxacin (CIPRO) 500 MG tablet Take 1 tablet by mouth Every 12 (Twelve) Hours. 28 tablet 0   • DULoxetine (CYMBALTA) 60 MG capsule      • furosemide (LASIX) 20 " MG tablet 2 po q am and one po q pm 270 tablet 1   • glucose blood (BRODERICK CONTOUR TEST) test strip Test once daily  DX E11.8 100 each 1   • HYDROcodone-acetaminophen (NORCO)  MG per tablet      • ipratropium (ATROVENT) 0.06 % nasal spray 2 sprays into each nostril 4 (Four) Times a Day. 1 each 3   • omeprazole (priLOSEC) 20 MG capsule Take 1 capsule by mouth Daily. 90 capsule 3   • OXcarbazepine (TRILEPTAL) 300 MG tablet      • SITagliptin (JANUVIA) 100 MG tablet Take 1 tablet by mouth Daily. (Patient taking differently: Take 50 mg by mouth Daily.) 28 tablet 0   • tamsulosin (FLOMAX) 0.4 MG capsule 24 hr capsule        No facility-administered encounter medications on file as of 4/24/2018.        Reviewed use of high risk medication in the elderly: yes  Reviewed for potential of harmful drug interactions in the elderly: yes    Follow Up:  No Follow-up on file.   Tdap and Zostavax ordered for pt to do at local pharmacy  Will recheck CBC today  Continue to use Dermaplast to skin tears, use cane and seated walker to help prevent falls.  Recommend pt do some PT for strengthening exercises and balance to help prevent falls.    Health advise: limit ice cream and sweets, make healthy food choices with fresh fruits and vegetables, maintain sleep pattern at least 8 hours, avoid texting and distracted driving practices; wear safety belt, engage in regular exercise, maintain healthy weight, use safe sex practices, avoid alcohol and illicit drugs. Maintain immunizations that are up to date. Follow up with PCP if struggling with depression or anxiety. Keep regular dental and eye exams. Brush and floss teeth daily.   F/U annually and prn.   An After Visit Summary and PPPS with all of these plans were given to the patient.

## 2018-04-25 ENCOUNTER — TELEPHONE (OUTPATIENT)
Dept: FAMILY MEDICINE CLINIC | Facility: CLINIC | Age: 73
End: 2018-04-25

## 2018-04-25 VITALS
WEIGHT: 213.8 LBS | HEART RATE: 88 BPM | HEIGHT: 69 IN | BODY MASS INDEX: 31.67 KG/M2 | SYSTOLIC BLOOD PRESSURE: 114 MMHG | DIASTOLIC BLOOD PRESSURE: 78 MMHG | TEMPERATURE: 96.4 F | RESPIRATION RATE: 20 BRPM

## 2018-04-25 LAB
ALBUMIN SERPL-MCNC: 4.6 G/DL (ref 3.2–4.8)
ALBUMIN/GLOB SERPL: 1.6 G/DL (ref 1.5–2.5)
ALP SERPL-CCNC: 85 U/L (ref 25–100)
ALT SERPL-CCNC: 27 U/L (ref 7–40)
AST SERPL-CCNC: 27 U/L (ref 0–33)
BASOPHILS # BLD AUTO: 0.07 10*3/MM3 (ref 0–0.2)
BASOPHILS NFR BLD AUTO: 0.5 % (ref 0–1)
BILIRUB SERPL-MCNC: 0.4 MG/DL (ref 0.3–1.2)
BUN SERPL-MCNC: 22 MG/DL (ref 9–23)
BUN/CREAT SERPL: 15.7 (ref 7–25)
CALCIUM SERPL-MCNC: 9.8 MG/DL (ref 8.7–10.4)
CHLORIDE SERPL-SCNC: 101 MMOL/L (ref 99–109)
CHOLEST SERPL-MCNC: 141 MG/DL (ref 0–200)
CO2 SERPL-SCNC: 30 MMOL/L (ref 20–31)
CREAT SERPL-MCNC: 1.4 MG/DL (ref 0.6–1.3)
EOSINOPHIL # BLD AUTO: 0.31 10*3/MM3 (ref 0–0.3)
EOSINOPHIL NFR BLD AUTO: 2.1 % (ref 0–3)
ERYTHROCYTE [DISTWIDTH] IN BLOOD BY AUTOMATED COUNT: 16.5 % (ref 11.3–14.5)
GFR SERPLBLD CREATININE-BSD FMLA CKD-EPI: 50 ML/MIN/1.73
GFR SERPLBLD CREATININE-BSD FMLA CKD-EPI: 60 ML/MIN/1.73
GLOBULIN SER CALC-MCNC: 2.9 GM/DL
GLUCOSE SERPL-MCNC: 121 MG/DL (ref 70–100)
HBA1C MFR BLD: 6.8 % (ref 4.8–5.6)
HCT VFR BLD AUTO: 45.4 % (ref 38.9–50.9)
HDLC SERPL-MCNC: 53 MG/DL (ref 40–60)
HGB BLD-MCNC: 14.8 G/DL (ref 13.1–17.5)
IMM GRANULOCYTES # BLD: 0.17 10*3/MM3 (ref 0–0.03)
IMM GRANULOCYTES NFR BLD: 1.2 % (ref 0–0.6)
LDLC SERPL CALC-MCNC: 71 MG/DL (ref 0–100)
LDLC/HDLC SERPL: 1.35 {RATIO}
LYMPHOCYTES # BLD AUTO: 3.04 10*3/MM3 (ref 0.6–4.8)
LYMPHOCYTES NFR BLD AUTO: 20.7 % (ref 24–44)
MCH RBC QN AUTO: 31.8 PG (ref 27–31)
MCHC RBC AUTO-ENTMCNC: 32.6 G/DL (ref 32–36)
MCV RBC AUTO: 97.4 FL (ref 80–99)
MONOCYTES # BLD AUTO: 1.35 10*3/MM3 (ref 0–1)
MONOCYTES NFR BLD AUTO: 9.2 % (ref 0–12)
NEUTROPHILS # BLD AUTO: 9.76 10*3/MM3 (ref 1.5–8.3)
NEUTROPHILS NFR BLD AUTO: 66.3 % (ref 41–71)
PLATELET # BLD AUTO: 342 10*3/MM3 (ref 150–450)
POTASSIUM SERPL-SCNC: 3.9 MMOL/L (ref 3.5–5.5)
PROT SERPL-MCNC: 7.5 G/DL (ref 5.7–8.2)
RBC # BLD AUTO: 4.66 10*6/MM3 (ref 4.2–5.76)
SODIUM SERPL-SCNC: 141 MMOL/L (ref 132–146)
TRIGL SERPL-MCNC: 83 MG/DL (ref 0–150)
UNABLE TO VOID: NORMAL
VLDLC SERPL CALC-MCNC: 16.6 MG/DL
WBC # BLD AUTO: 14.7 10*3/MM3 (ref 3.5–10.8)

## 2018-04-26 LAB — MICROALBUMIN UR-MCNC: 14.1 UG/ML

## 2018-04-26 NOTE — PROGRESS NOTES
I reviewed the labs that Anna Graham the other day.  His kidney function is still not great but stable.  Liver function okay.  His diabetes is a little worse but still relatively stable.  Unfortunately his white blood cell count is not improved although it's not worse.    If he is still not feeling well I would like him to see hematology to further evaluate his leukocytosis.

## 2018-04-27 ENCOUNTER — TELEPHONE (OUTPATIENT)
Dept: FAMILY MEDICINE CLINIC | Facility: CLINIC | Age: 73
End: 2018-04-27

## 2018-04-27 DIAGNOSIS — D72.829 LEUKOCYTOSIS, UNSPECIFIED TYPE: Primary | ICD-10-CM

## 2018-04-27 NOTE — TELEPHONE ENCOUNTER
reviewed the labs that Anna Graham the other day.  His kidney function is still not great but stable.  Liver function okay.  His diabetes is a little worse but still relatively stable.  Unfortunately his white blood cell count is not improved although it's not worse.     If he is still not feeling well I would like him to see hematology to further evaluate his leukocytosis.

## 2018-05-08 ENCOUNTER — CONSULT (OUTPATIENT)
Dept: ONCOLOGY | Facility: CLINIC | Age: 73
End: 2018-05-08

## 2018-05-08 ENCOUNTER — LAB (OUTPATIENT)
Dept: LAB | Facility: HOSPITAL | Age: 73
End: 2018-05-08

## 2018-05-08 VITALS
DIASTOLIC BLOOD PRESSURE: 82 MMHG | TEMPERATURE: 97.7 F | RESPIRATION RATE: 20 BRPM | HEIGHT: 69 IN | WEIGHT: 208 LBS | HEART RATE: 81 BPM | BODY MASS INDEX: 30.81 KG/M2 | SYSTOLIC BLOOD PRESSURE: 145 MMHG

## 2018-05-08 DIAGNOSIS — D89.89 OTHER SPECIFIED DISORDERS INVOLVING THE IMMUNE MECHANISM, NOT ELSEWHERE CLASSIFIED (HCC): ICD-10-CM

## 2018-05-08 DIAGNOSIS — D72.829 LEUKOCYTOSIS, UNSPECIFIED TYPE: ICD-10-CM

## 2018-05-08 DIAGNOSIS — D72.829 LEUKOCYTOSIS, UNSPECIFIED TYPE: Primary | ICD-10-CM

## 2018-05-08 LAB
BASOPHILS # BLD AUTO: 0.05 10*3/MM3 (ref 0–0.2)
BASOPHILS NFR BLD AUTO: 0.4 % (ref 0–1)
CRP SERPL-MCNC: 3.46 MG/DL (ref 0–1)
DEPRECATED RDW RBC AUTO: 56.4 FL (ref 37–54)
EOSINOPHIL # BLD AUTO: 0.18 10*3/MM3 (ref 0–0.3)
EOSINOPHIL NFR BLD AUTO: 1.5 % (ref 0–3)
ERYTHROCYTE [DISTWIDTH] IN BLOOD BY AUTOMATED COUNT: 15.7 % (ref 11.3–14.5)
ERYTHROCYTE [SEDIMENTATION RATE] IN BLOOD: 43 MM/HR (ref 0–20)
HCT VFR BLD AUTO: 45.5 % (ref 38.9–50.9)
HGB BLD-MCNC: 14.8 G/DL (ref 13.1–17.5)
IMM GRANULOCYTES # BLD: 0.04 10*3/MM3 (ref 0–0.03)
IMM GRANULOCYTES NFR BLD: 0.3 % (ref 0–0.6)
LYMPHOCYTES # BLD AUTO: 2.4 10*3/MM3 (ref 0.6–4.8)
LYMPHOCYTES NFR BLD AUTO: 19.6 % (ref 24–44)
MCH RBC QN AUTO: 32 PG (ref 27–31)
MCHC RBC AUTO-ENTMCNC: 32.5 G/DL (ref 32–36)
MCV RBC AUTO: 98.5 FL (ref 80–99)
MONOCYTES # BLD AUTO: 1.06 10*3/MM3 (ref 0–1)
MONOCYTES NFR BLD AUTO: 8.7 % (ref 0–12)
NEUTROPHILS # BLD AUTO: 8.5 10*3/MM3 (ref 1.5–8.3)
NEUTROPHILS NFR BLD AUTO: 69.5 % (ref 41–71)
PLATELET # BLD AUTO: 362 10*3/MM3 (ref 150–450)
PMV BLD AUTO: 10.3 FL (ref 6–12)
RBC # BLD AUTO: 4.62 10*6/MM3 (ref 4.2–5.76)
TSH SERPL DL<=0.05 MIU/L-ACNC: 2.43 MIU/ML (ref 0.35–5.35)
WBC NRBC COR # BLD: 12.23 10*3/MM3 (ref 3.5–10.8)

## 2018-05-08 PROCEDURE — 85060 BLOOD SMEAR INTERPRETATION: CPT

## 2018-05-08 PROCEDURE — 86140 C-REACTIVE PROTEIN: CPT

## 2018-05-08 PROCEDURE — 81206 BCR/ABL1 GENE MAJOR BP: CPT

## 2018-05-08 PROCEDURE — 99205 OFFICE O/P NEW HI 60 MIN: CPT | Performed by: INTERNAL MEDICINE

## 2018-05-08 PROCEDURE — 85025 COMPLETE CBC W/AUTO DIFF WBC: CPT

## 2018-05-08 PROCEDURE — 86334 IMMUNOFIX E-PHORESIS SERUM: CPT

## 2018-05-08 PROCEDURE — 81207 BCR/ABL1 GENE MINOR BP: CPT

## 2018-05-08 PROCEDURE — 87799 DETECT AGENT NOS DNA QUANT: CPT

## 2018-05-08 PROCEDURE — 84165 PROTEIN E-PHORESIS SERUM: CPT

## 2018-05-08 PROCEDURE — 84155 ASSAY OF PROTEIN SERUM: CPT

## 2018-05-08 PROCEDURE — 84443 ASSAY THYROID STIM HORMONE: CPT

## 2018-05-08 PROCEDURE — 82784 ASSAY IGA/IGD/IGG/IGM EACH: CPT

## 2018-05-08 PROCEDURE — 36415 COLL VENOUS BLD VENIPUNCTURE: CPT

## 2018-05-08 PROCEDURE — 85652 RBC SED RATE AUTOMATED: CPT

## 2018-05-08 NOTE — PROGRESS NOTES
CHIEF COMPLAINT: Leukocytosis    REASON FOR REFERRAL: Same      RECORDS OBTAINED  Records of the patients history including those obtained from  Vahe Coronel were reviewed and summarized in detail.       Leukocytosis    5/8/2018 Initial Diagnosis     Leukocytosis: Patient saw me for first time 5/8/18 for white count of 14,700 on 4/24/18.  White count 13,760 week prior.  When I reviewed his records he had a white count as high as 11,980 in September 2015 and 11,090 in July 2015.  As chronic MCV elevation to the mid 100s but presently is normal at 97 with unremarkable differential.  I'm asked to see him due to this leukocytosis.  He does have a problem with chronic prostatitis and has recently been treated with Cipro in April 2018.  Apparently I had seen in previous years for similar issue though I do not have his medical records.  He had the flu in February 2018 and has been periodically cold and hot with subnormal temperatures at times.  No weight loss but actually gaining weight.  Just feels running out and tired all the time.  Came to me because of the modest elevation in white count and feeling run down.            HISTORY OF PRESENT ILLNESS:  The patient is a 73 y.o.  male, referred for Management of leukocytosis in the face of recent flu like illness with feeling quite fatigued.  See above for my history of present illness regarding his leukocytosis.    REVIEW OF SYSTEMS:  A 14 point review of systems was performed and is negative except as noted above.    Past Medical History:   Diagnosis Date   • Allergic rhinitis    • Anxiety    • Chronic neck pain    • COPD (chronic obstructive pulmonary disease)    • Depression    • Diabetes mellitus    • Duodenal adenoma    • Low back pain    • Sleep apnea     intolerant to CPAP     Past Surgical History:   Procedure Laterality Date   • BACK SURGERY     • EYE SURGERY     • SINUS SURGERY     • SPINE SURGERY         Current Outpatient Prescriptions on File Prior to Visit    Medication Sig Dispense Refill   • albuterol (PROVENTIL HFA;VENTOLIN HFA) 108 (90 BASE) MCG/ACT inhaler ProAir  (90 Base) MCG/ACT Inhalation Aerosol Solution; Patient Sig: ProAir  (90 Base) MCG/ACT Inhalation Aerosol Solution ; 0; 08-Aug-2013; Active     • ALPRAZolam (XANAX) 1 MG tablet TAKE ONE TABLET BY MOUTH THREE TIMES A DAY AS NEEDED FOR ANXIETY 90 tablet 4   • atorvastatin (LIPITOR) 10 MG tablet Take 1 tablet by mouth Daily. 90 tablet 1   • budesonide-formoterol (SYMBICORT) 160-4.5 MCG/ACT inhaler Inhale 2 puffs 2 (two) times a day.     • ciprofloxacin (CIPRO) 500 MG tablet Take 1 tablet by mouth Every 12 (Twelve) Hours. 28 tablet 0   • DULoxetine (CYMBALTA) 60 MG capsule      • furosemide (LASIX) 20 MG tablet 2 po q am and one po q pm 270 tablet 1   • glucose blood (BRODERICK CONTOUR TEST) test strip Test once daily  DX E11.8 100 each 1   • HYDROcodone-acetaminophen (NORCO)  MG per tablet      • ipratropium (ATROVENT) 0.06 % nasal spray 2 sprays into each nostril 4 (Four) Times a Day. 1 each 3   • omeprazole (priLOSEC) 20 MG capsule Take 1 capsule by mouth Daily. 90 capsule 3   • OXcarbazepine (TRILEPTAL) 300 MG tablet      • SITagliptin (JANUVIA) 100 MG tablet Take 1 tablet by mouth Daily. (Patient taking differently: Take 50 mg by mouth Daily.) 28 tablet 0   • tamsulosin (FLOMAX) 0.4 MG capsule 24 hr capsule        No current facility-administered medications on file prior to visit.        Allergies   Allergen Reactions   • Amitriptyline Other (See Comments)     Caused vision and hearing problems and constipation   • Lyrica [Pregabalin] Confusion       Social History     Social History   • Marital status:      Social History Main Topics   • Smoking status: Former Smoker   • Smokeless tobacco: Never Used      Comment: quit 10 years ago   • Alcohol use Yes      Comment: rare   • Drug use: No   • Sexual activity: Defer     Other Topics Concern   • Not on file       Family History  "  Problem Relation Age of Onset   • Coronary artery disease Mother    • Hypertension Mother    • Pancreatic cancer Father    • Coronary artery disease Father        PHYSICAL EXAM:    /82   Pulse 81   Temp 97.7 °F (36.5 °C)   Resp 20   Ht 175.9 cm (69.25\")   Wt 94.3 kg (208 lb)   BMI 30.49 kg/m²     ECOG: (1) Restricted in physically strenuous activity, ambulatory and able to do work of light nature  General: well appearing male in no acute distress  HEENT: sclera anicteric, oropharynx clear  Lymphatics: no cervical, supraclavicular, inguinal, or axillary adenopathy  Cardiovascular: regular rate and rhythm, no murmurs  Neck: Supple; No thyromegaly  Lungs: clear to auscultation bilaterally. No respiratory distress.   Abdomen: soft, nontender, nondistended.  No palpable organomegaly  Extremities: no cyanosis, clubbing, edema, or cords  Skin: no rashes, lesions, bruising, or petechiae  Neuro: Alert and oriented x 4; Moving all extremities.  Psych: No anxiety or depression    Orders Only on 04/25/2018   Component Date Value Ref Range Status   • Microalbumin, Urine 04/26/2018 14.1  Not Estab. ug/mL Final   Office Visit on 04/11/2018   Component Date Value Ref Range Status   • WBC 04/11/2018 13.76* 3.50 - 10.80 10*3/mm3 Final   • RBC 04/11/2018 4.57  4.20 - 5.76 10*6/mm3 Final   • Hemoglobin 04/11/2018 14.4  13.1 - 17.5 g/dL Final   • Hematocrit 04/11/2018 45.3  38.9 - 50.9 % Final   • MCV 04/11/2018 99.1* 80.0 - 99.0 fL Final   • MCH 04/11/2018 31.5* 27.0 - 31.0 pg Final   • MCHC 04/11/2018 31.8* 32.0 - 36.0 g/dL Final   • RDW 04/11/2018 16.6* 11.3 - 14.5 % Final   • Platelets 04/11/2018 287  150 - 450 10*3/mm3 Final   • Neutrophil Rel % 04/11/2018 72.7* 41.0 - 71.0 % Final   • Lymphocyte Rel % 04/11/2018 17.2* 24.0 - 44.0 % Final   • Monocyte Rel % 04/11/2018 7.6  0.0 - 12.0 % Final   • Eosinophil Rel % 04/11/2018 1.4  0.0 - 3.0 % Final   • Basophil Rel % 04/11/2018 0.4  0.0 - 1.0 % Final   • Neutrophils " Absolute 04/11/2018 10.01* 1.50 - 8.30 10*3/mm3 Final   • Lymphocytes Absolute 04/11/2018 2.37  0.60 - 4.80 10*3/mm3 Final   • Monocytes Absolute 04/11/2018 1.04* 0.00 - 1.00 10*3/mm3 Final   • Eosinophils Absolute 04/11/2018 0.19  0.00 - 0.30 10*3/mm3 Final   • Basophils Absolute 04/11/2018 0.05  0.00 - 0.20 10*3/mm3 Final   • Immature Granulocyte Rel % 04/11/2018 0.7* 0.0 - 0.6 % Final   • Immature Grans Absolute 04/11/2018 0.10* 0.00 - 0.03 10*3/mm3 Final   • Glucose 04/11/2018 134* 70 - 100 mg/dL Final   • BUN 04/11/2018 17  9 - 23 mg/dL Final   • Creatinine 04/11/2018 1.40* 0.60 - 1.30 mg/dL Final   • eGFR Non African Am 04/11/2018 50* >60 mL/min/1.73 Final    Comment: [employer] Guidelines  Stage     Description        GFR  1         Normal or High     90+  2         Mild decrease      60-89  3         Moderate decrease  30-59  4         Severe decrease    15-29  5         Kidney failure     <15     • eGFR African Am 04/11/2018 60* >60 mL/min/1.73 Final   • BUN/Creatinine Ratio 04/11/2018 12.1  7.0 - 25.0 Final   • Sodium 04/11/2018 143  132 - 146 mmol/L Final   • Potassium 04/11/2018 4.3  3.5 - 5.5 mmol/L Final   • Chloride 04/11/2018 104  99 - 109 mmol/L Final   • Total CO2 04/11/2018 29.0  20.0 - 31.0 mmol/L Final   • Calcium 04/11/2018 8.9  8.7 - 10.4 mg/dL Final   • Total Protein 04/11/2018 7.1  5.7 - 8.2 g/dL Final   • Albumin 04/11/2018 4.00  3.20 - 4.80 g/dL Final   • Globulin 04/11/2018 3.1  gm/dL Final   • A/G Ratio 04/11/2018 1.3* 1.5 - 2.5 g/dL Final   • Total Bilirubin 04/11/2018 0.3  0.3 - 1.2 mg/dL Final   • Alkaline Phosphatase 04/11/2018 76  25 - 100 U/L Final   • AST (SGOT) 04/11/2018 23  0 - 33 U/L Final   • ALT (SGPT) 04/11/2018 23  7 - 40 U/L Final   • Color 04/11/2018 Yellow  Yellow, Straw, Dark Yellow, Kristina Final   • Clarity, UA 04/11/2018 Clear  Clear Final   • Glucose, UA 04/11/2018 Negative  Negative, 1000 mg/dL (3+) mg/dL Final   • Bilirubin 04/11/2018 Negative   Negative Final   • Ketones, UA 04/11/2018 Negative  Negative Final   • Specific Gravity  04/11/2018 1.015  1.005 - 1.030 Final   • Blood, UA 04/11/2018 Negative  Negative Final   • pH, Urine 04/11/2018 6.0  5.0 - 8.0 Final   • Protein, POC 04/11/2018 Trace* Negative mg/dL Final   • Urobilinogen, UA 04/11/2018 8 E.U./dL * Normal Final   • Leukocytes 04/11/2018 Moderate (2+)* Negative Final   • Nitrite, UA 04/11/2018 Negative  Negative Final   Office Visit on 04/06/2018   Component Date Value Ref Range Status   • WBC 04/07/2018 14.6* 3.4 - 10.8 x10E3/uL Final   • RBC 04/07/2018 4.79  4.14 - 5.80 x10E6/uL Final   • Hemoglobin 04/07/2018 15.5  13.0 - 17.7 g/dL Final   • Hematocrit 04/07/2018 45.9  37.5 - 51.0 % Final   • MCV 04/07/2018 96  79 - 97 fL Final   • MCH 04/07/2018 32.4  26.6 - 33.0 pg Final   • MCHC 04/07/2018 33.8  31.5 - 35.7 g/dL Final   • RDW 04/07/2018 16.4* 12.3 - 15.4 % Final   • Platelets 04/07/2018 326  150 - 379 x10E3/uL Final   • Neutrophil Rel % 04/07/2018 69  Not Estab. % Final   • Lymphocyte Rel % 04/07/2018 20  Not Estab. % Final   • Monocyte Rel % 04/07/2018 8  Not Estab. % Final   • Eosinophil Rel % 04/07/2018 2  Not Estab. % Final   • Basophil Rel % 04/07/2018 1  Not Estab. % Final   • Neutrophils Absolute 04/07/2018 10.1* 1.4 - 7.0 x10E3/uL Final   • Lymphocytes Absolute 04/07/2018 2.9  0.7 - 3.1 x10E3/uL Final   • Monocytes Absolute 04/07/2018 1.2* 0.1 - 0.9 x10E3/uL Final   • Eosinophils Absolute 04/07/2018 0.2  0.0 - 0.4 x10E3/uL Final   • Basophils Absolute 04/07/2018 0.1  0.0 - 0.2 x10E3/uL Final   • Immature Granulocyte Rel % 04/07/2018 0  Not Estab. % Final   • Immature Grans Absolute 04/07/2018 0.0  0.0 - 0.1 x10E3/uL Final   • BNP 04/07/2018 22.2  0.0 - 100.0 pg/mL Final   • INR 04/07/2018 1.0  0.8 - 1.2 Final    Comment: Reference interval is for non-anticoagulated patients.  Suggested INR therapeutic range for Vitamin K  antagonist therapy:     Standard Dose (moderate intensity    "                 therapeutic range):       2.0 - 3.0     Higher intensity therapeutic range       2.5 - 3.5     • Protime 04/07/2018 10.4  9.1 - 12.0 sec Final   • Glucose 04/07/2018 140* 65 - 99 mg/dL Final   • BUN 04/07/2018 16  8 - 27 mg/dL Final   • Creatinine 04/07/2018 1.60* 0.76 - 1.27 mg/dL Final   • eGFR Non  Am 04/07/2018 42* >59 mL/min/1.73 Final   • eGFR African Am 04/07/2018 49* >59 mL/min/1.73 Final   • BUN/Creatinine Ratio 04/07/2018 10  10 - 24 Final   • Sodium 04/07/2018 141  134 - 144 mmol/L Final   • Potassium 04/07/2018 4.9  3.5 - 5.2 mmol/L Final   • Chloride 04/07/2018 96  96 - 106 mmol/L Final   • Total CO2 04/07/2018 25  18 - 29 mmol/L Final   • Calcium 04/07/2018 9.8  8.6 - 10.2 mg/dL Final   • D-Dimer, Quant 04/07/2018 0.36  0.00 - 0.49 mg/L FEU Final    Comment: According to the assay 's published package insert, a  normal (<0.50 mg/L FEU) D-dimer result in conjunction with a non-high  clinical probability assessment, excludes deep vein thrombosis (DVT)  and pulmonary embolism (PE) with high sensitivity.  D-dimer values increase with age and this can make VTE exclusion of  an older population difficult. To address this, the [employer] College  [employer], based on best available evidence and recent guidelines,  recommends that clinicians use age-adjusted D-dimer thresholds in  patients greater than 50 years of age with: a) a low probability of  PE who do not meet all Pulmonary Embolism Rule Out Criteria, or  b) in those with intermediate probability of PE. The formula for an  age-adjusted D-dimer cut-off is \"age/100\". For example, a 60 year old  patient would have an age-adjusted cut-off of 0.60 mg/L FEU and an  80 year old 0.80 mg/L FEU.         Assessment/Plan     1. Leukocytosis  2. Severe fatigue  3. Night sweats  4. Flank pains    Discussion: I feel no adenopathy or hepatosplenomegaly to suggest lymphoproliferative or myeloproliferative process.  I will check his " BCR abl gene rearrangement along with a sedimentation rate C-reactive protein and I will get his Alex-Barr virus and CMV titers given his ongoing fatigue.  We'll also check his thyroid functions.  I'm going to check noncontrast CT of the chest abdomen and pelvis in light of his flank pains and night sweats.  I'll also check a urine culture.  We'll see him back in a couple of weeks to go over all this.      Arsen Rosenberg MD    5/8/2018

## 2018-05-09 ENCOUNTER — LAB (OUTPATIENT)
Dept: LAB | Facility: HOSPITAL | Age: 73
End: 2018-05-09

## 2018-05-09 DIAGNOSIS — D72.829 LEUKOCYTOSIS, UNSPECIFIED TYPE: ICD-10-CM

## 2018-05-09 DIAGNOSIS — D89.89 OTHER SPECIFIED DISORDERS INVOLVING THE IMMUNE MECHANISM, NOT ELSEWHERE CLASSIFIED (HCC): ICD-10-CM

## 2018-05-09 LAB
BACTERIA UR QL AUTO: ABNORMAL /HPF
BILIRUB UR QL STRIP: NEGATIVE
CLARITY UR: CLEAR
COLOR UR: YELLOW
CYTOLOGIST CVX/VAG CYTO: NORMAL
GLUCOSE UR STRIP-MCNC: NEGATIVE MG/DL
HGB UR QL STRIP.AUTO: NEGATIVE
HYALINE CASTS UR QL AUTO: ABNORMAL /LPF
KETONES UR QL STRIP: ABNORMAL
LEUKOCYTE ESTERASE UR QL STRIP.AUTO: NEGATIVE
NITRITE UR QL STRIP: NEGATIVE
PATH INTERP BLD-IMP: NORMAL
PH UR STRIP.AUTO: 6 [PH] (ref 5–8)
PROT UR QL STRIP: ABNORMAL
RBC # UR: ABNORMAL /HPF
REF LAB TEST METHOD: ABNORMAL
SP GR UR STRIP: 1.02 (ref 1–1.03)
SQUAMOUS #/AREA URNS HPF: ABNORMAL /HPF
UROBILINOGEN UR QL STRIP: ABNORMAL
WBC UR QL AUTO: ABNORMAL /HPF

## 2018-05-09 PROCEDURE — 81001 URINALYSIS AUTO W/SCOPE: CPT

## 2018-05-10 LAB
ALBUMIN SERPL-MCNC: 3.9 G/DL (ref 2.9–4.4)
ALBUMIN/GLOB SERPL: 1 {RATIO} (ref 0.7–1.7)
ALPHA1 GLOB FLD ELPH-MCNC: 0.3 G/DL (ref 0–0.4)
ALPHA2 GLOB SERPL ELPH-MCNC: 1.1 G/DL (ref 0.4–1)
B-GLOBULIN SERPL ELPH-MCNC: 1.4 G/DL (ref 0.7–1.3)
GAMMA GLOB SERPL ELPH-MCNC: 1.2 G/DL (ref 0.4–1.8)
GLOBULIN SER CALC-MCNC: 4 G/DL (ref 2.2–3.9)
IGA SERPL-MCNC: 461 MG/DL (ref 61–437)
IGG SERPL-MCNC: 1134 MG/DL (ref 700–1600)
IGM SERPL-MCNC: 71 MG/DL (ref 15–143)
INTERPRETATION SERPL IEP-IMP: ABNORMAL
Lab: ABNORMAL
M-SPIKE: ABNORMAL G/DL
PROT SERPL-MCNC: 7.9 G/DL (ref 6–8.5)

## 2018-05-11 LAB
CMV DNA SERPL NAA+PROBE-ACNC: NEGATIVE IU/ML
EBV DNA # SERPL NAA+PROBE: NEGATIVE COPIES/ML
LOG 10 CMV QN DNA PI: NORMAL LOG10 IU/ML
LOG 10 EBV DNA QN PCR: NORMAL LOG10 COPY/ML

## 2018-05-16 ENCOUNTER — HOSPITAL ENCOUNTER (OUTPATIENT)
Dept: CT IMAGING | Facility: HOSPITAL | Age: 73
Discharge: HOME | End: 2018-05-16
Attending: INTERNAL MEDICINE

## 2018-05-16 ENCOUNTER — HOSPITAL ENCOUNTER (OUTPATIENT)
Dept: CARDIOLOGY | Facility: HOSPITAL | Age: 73
Discharge: HOME | End: 2018-05-16
Admitting: NURSE PRACTITIONER

## 2018-05-16 VITALS — BODY MASS INDEX: 30.81 KG/M2 | HEIGHT: 69 IN | WEIGHT: 208 LBS

## 2018-05-16 DIAGNOSIS — R06.02 SHORTNESS OF BREATH: ICD-10-CM

## 2018-05-16 DIAGNOSIS — R06.09 DOE (DYSPNEA ON EXERTION): ICD-10-CM

## 2018-05-16 DIAGNOSIS — D72.829 LEUKOCYTOSIS, UNSPECIFIED TYPE: ICD-10-CM

## 2018-05-16 LAB
INTERPRETATION: NORMAL
LAB DIRECTOR NAME PROVIDER: NORMAL
LABORATORY COMMENT REPORT: NORMAL
Lab: NORMAL
T(ABL1,BCR)B2A2/CONTROL (IS) BLD/T: NORMAL %
T(ABL1,BCR)B3A2 MAR QL: NORMAL %
T(ABL1,BCR)E1A2/CONTROL BLD/T: NORMAL %

## 2018-05-16 PROCEDURE — 93306 TTE W/DOPPLER COMPLETE: CPT | Performed by: INTERNAL MEDICINE

## 2018-05-16 PROCEDURE — 71250 CT THORAX DX C-: CPT

## 2018-05-16 PROCEDURE — 74176 CT ABD & PELVIS W/O CONTRAST: CPT

## 2018-05-16 PROCEDURE — 93306 TTE W/DOPPLER COMPLETE: CPT

## 2018-05-17 LAB
BH CV ECHO MEAS - AO ROOT AREA (BSA CORRECTED): 1.4
BH CV ECHO MEAS - AO ROOT AREA: 7.1 CM^2
BH CV ECHO MEAS - AO ROOT DIAM: 3 CM
BH CV ECHO MEAS - BSA(HAYCOCK): 2.2 M^2
BH CV ECHO MEAS - BSA: 2.1 M^2
BH CV ECHO MEAS - BZI_BMI: 30.7 KILOGRAMS/M^2
BH CV ECHO MEAS - BZI_METRIC_HEIGHT: 175.3 CM
BH CV ECHO MEAS - BZI_METRIC_WEIGHT: 94.3 KG
BH CV ECHO MEAS - CONTRAST EF (2CH): 58.4 ML/M^2
BH CV ECHO MEAS - CONTRAST EF 4CH: 53.1 ML/M^2
BH CV ECHO MEAS - EDV(CUBED): 119.1 ML
BH CV ECHO MEAS - EDV(MOD-SP2): 89 ML
BH CV ECHO MEAS - EDV(MOD-SP4): 98 ML
BH CV ECHO MEAS - EDV(TEICH): 113.9 ML
BH CV ECHO MEAS - EF(CUBED): 64.3 %
BH CV ECHO MEAS - EF(MOD-BP): 57 %
BH CV ECHO MEAS - EF(MOD-SP2): 58.4 %
BH CV ECHO MEAS - EF(MOD-SP4): 53.1 %
BH CV ECHO MEAS - EF(TEICH): 55.6 %
BH CV ECHO MEAS - ESV(CUBED): 42.5 ML
BH CV ECHO MEAS - ESV(MOD-SP2): 37 ML
BH CV ECHO MEAS - ESV(MOD-SP4): 46 ML
BH CV ECHO MEAS - ESV(TEICH): 50.5 ML
BH CV ECHO MEAS - FS: 29.1 %
BH CV ECHO MEAS - IVS/LVPW: 0.76
BH CV ECHO MEAS - IVSD: 0.75 CM
BH CV ECHO MEAS - LAT PEAK E' VEL: 8.1 CM/SEC
BH CV ECHO MEAS - LV DIASTOLIC VOL/BSA (35-75): 46.7 ML/M^2
BH CV ECHO MEAS - LV MASS(C)D: 145.1 GRAMS
BH CV ECHO MEAS - LV MASS(C)DI: 69.1 GRAMS/M^2
BH CV ECHO MEAS - LV SYSTOLIC VOL/BSA (12-30): 21.9 ML/M^2
BH CV ECHO MEAS - LVIDD: 4.9 CM
BH CV ECHO MEAS - LVIDS: 3.5 CM
BH CV ECHO MEAS - LVLD AP2: 7 CM
BH CV ECHO MEAS - LVLD AP4: 6.7 CM
BH CV ECHO MEAS - LVLS AP2: 5.9 CM
BH CV ECHO MEAS - LVLS AP4: 6 CM
BH CV ECHO MEAS - LVPWD: 0.98 CM
BH CV ECHO MEAS - MED PEAK E' VEL: 8.9 CM/SEC
BH CV ECHO MEAS - MV A MAX VEL: 106 CM/SEC
BH CV ECHO MEAS - MV E MAX VEL: 69.1 CM/SEC
BH CV ECHO MEAS - MV E/A: 0.65
BH CV ECHO MEAS - PA ACC SLOPE: 581 CM/SEC^2
BH CV ECHO MEAS - PA ACC TIME: 0.12 SEC
BH CV ECHO MEAS - PA PR(ACCEL): 23.7 MMHG
BH CV ECHO MEAS - RVDD: 2.6 CM
BH CV ECHO MEAS - SI(CUBED): 36.5 ML/M^2
BH CV ECHO MEAS - SI(MOD-SP2): 24.8 ML/M^2
BH CV ECHO MEAS - SI(MOD-SP4): 24.8 ML/M^2
BH CV ECHO MEAS - SI(TEICH): 30.2 ML/M^2
BH CV ECHO MEAS - SV(CUBED): 76.6 ML
BH CV ECHO MEAS - SV(MOD-SP2): 52 ML
BH CV ECHO MEAS - SV(MOD-SP4): 52 ML
BH CV ECHO MEAS - SV(TEICH): 63.4 ML
BH CV ECHO MEAS - TAPSE (>1.6): 2.1 CM2
BH CV ECHO MEASUREMENTS AVERAGE E/E' RATIO: 8.13
BH CV VAS BP LEFT ARM: NORMAL MMHG
BH CV XLRA - RV BASE: 4.1 CM
BH CV XLRA - RV LENGTH: 8 CM
BH CV XLRA - RV MID: 3.1 CM
BH CV XLRA - TDI S': 13.9 CM/SEC
LEFT ATRIUM VOLUME INDEX: 24.3 ML/M2
LEFT ATRIUM VOLUME: 51 CM3
LV EF 2D ECHO EST: 55 %

## 2018-05-18 LAB — REF LAB TEST METHOD: NORMAL

## 2018-05-22 ENCOUNTER — OFFICE VISIT (OUTPATIENT)
Dept: ONCOLOGY | Facility: CLINIC | Age: 73
End: 2018-05-22

## 2018-05-22 VITALS
RESPIRATION RATE: 20 BRPM | BODY MASS INDEX: 31.1 KG/M2 | TEMPERATURE: 97.4 F | HEART RATE: 91 BPM | WEIGHT: 210 LBS | SYSTOLIC BLOOD PRESSURE: 160 MMHG | HEIGHT: 69 IN | DIASTOLIC BLOOD PRESSURE: 80 MMHG

## 2018-05-22 DIAGNOSIS — D72.829 LEUKOCYTOSIS, UNSPECIFIED TYPE: Primary | ICD-10-CM

## 2018-05-22 PROCEDURE — 99213 OFFICE O/P EST LOW 20 MIN: CPT | Performed by: INTERNAL MEDICINE

## 2018-05-22 NOTE — PROGRESS NOTES
CHIEF COMPLAINT: Reactive leukocytosis    Problem List:     Leukocytosis    5/8/2018 Initial Diagnosis     Reactive Leukocytosis: Patient saw me for first time 5/8/18 for white count of 14,700 on 4/24/18.  White count 13,760 week prior.  When I reviewed his records he had a white count as high as 11,980 in September 2015 and 11,090 in July 2015.  As chronic MCV elevation to the mid 100s but presently is normal at 97 with unremarkable differential.  I'm asked to see him due to this leukocytosis.  He does have a problem with chronic prostatitis and has recently been treated with Cipro in April 2018.  Apparently I had seen in previous years for similar issue though I do not have his medical records.  He had the flu in February 2018 and has been periodically cold and hot with subnormal temperatures at times.  No weight loss but actually gaining weight.  Just feels running out and tired all the time.  Came to me because of the modest elevation in white count and feeling run down.  -Labs 5/8/18: White count 12,230 with unremarkable peripheral smear on pathologic review.  Sedimentation rate elevated to 43 with C-reactive protein 3.46.  BCR ABL negative.  Flow cytometry showing small population of 0.5% myeloblasts which could be reactive but could not entirely rule out myeloid malignancy.  TSH normal 2.4.  Slight elevation of IgA but no clearcut monoclonal gammopathy.  CMV and EBV PCR were negative.  Urinalysis showed clearing of leukocyte esterase and white cells suggesting treated prostatitis or urinary tract infection.         5/16/2018 Imaging     Echocardiogram ejection fraction 55%  CT noncontrast chest abdomen pelvis negative            HISTORY OF PRESENT ILLNESS:  The patient is a 73 y.o. male, here for follow up on management of Leukocytosis.  I reviewed the labs as above.  He does sweat at night but it is not bed drenching and not associated with fevers.      Past Medical History:   Diagnosis Date   • Allergic  "rhinitis    • Anxiety    • Chronic neck pain    • COPD (chronic obstructive pulmonary disease)    • Depression    • Diabetes mellitus    • Duodenal adenoma    • Low back pain    • Sleep apnea     intolerant to CPAP     Past Surgical History:   Procedure Laterality Date   • BACK SURGERY     • EYE SURGERY     • SINUS SURGERY     • SPINE SURGERY         Allergies   Allergen Reactions   • Amitriptyline Other (See Comments)     Caused vision and hearing problems and constipation   • Lyrica [Pregabalin] Confusion       Family History and Social History reviewed and changed as necessary      REVIEW OF SYSTEM:   Review of Systems   Constitutional: Negative for appetite change, chills, diaphoresis, fatigue, fever and unexpected weight change.   HENT:   Negative for mouth sores, sore throat and trouble swallowing.    Eyes: Negative for icterus.   Respiratory: Negative for cough, hemoptysis and shortness of breath.    Cardiovascular: Negative for chest pain, leg swelling and palpitations.   Gastrointestinal: Negative for abdominal distention, abdominal pain, blood in stool, constipation, diarrhea, nausea and vomiting.   Endocrine: Negative for hot flashes.   Genitourinary: Negative for bladder incontinence, difficulty urinating, dysuria, frequency and hematuria.    Musculoskeletal: Negative for gait problem, neck pain and neck stiffness.   Skin: Negative for rash.   Neurological: Negative for dizziness, gait problem, headaches, light-headedness and numbness.   Hematological: Negative for adenopathy. Does not bruise/bleed easily.   Psychiatric/Behavioral: Negative for depression. The patient is not nervous/anxious.    All other systems reviewed and are negative.       PHYSICAL EXAM    Vitals:    05/22/18 0916   BP: 160/80   Pulse: 91   Resp: 20   Temp: 97.4 °F (36.3 °C)   Weight: 95.3 kg (210 lb)   Height: 175.9 cm (69.25\")     Constitutional: Appears well-developed and well-nourished. No distress.   ECOG: (1) Restricted in " physically strenuous activity, ambulatory and able to do work of light nature  HENT:   Head: Normocephalic.   Mouth/Throat: Oropharynx is clear and moist.   Eyes: Conjunctivae are normal. Pupils are equal, round, and reactive to light. No scleral icterus.   Neck: Neck supple. No JVD present. No thyromegaly present.   Cardiovascular: Normal rate, regular rhythm and normal heart sounds.    Pulmonary/Chest: Breath sounds normal. No respiratory distress.   Abdominal: Soft. Exhibits no distension and no mass. There is no hepatosplenomegaly. There is no tenderness. There is no rebound and no guarding.   Musculoskeletal:Exhibits no edema, tenderness or deformity.   Neurological: Alert and oriented to person, place, and time. Exhibits normal muscle tone.   Skin: No ecchymosis, no petechiae and no rash noted. Not diaphoretic. No cyanosis. Nails show no clubbing.   Psychiatric: Normal mood and affect.   Vitals reviewed.      Ct Abdomen Pelvis Without Contrast    Result Date: 5/16/2018  Negative CT scan of the abdomen and pelvis. There is no splenomegaly or lymphadenopathy.  D:  05/16/2018 E:  05/16/2018  This report was finalized on 5/16/2018 5:46 PM by Dr. Dave Easley MD.      Ct Chest Without Contrast    Result Date: 5/16/2018  Negative CT scan of the chest. There has been no change when compared with 04/06/2018.  D:  05/16/2018 E:  05/16/2018    This report was finalized on 5/16/2018 5:43 PM by Dr. Dave Easley MD.              ASSESSMENT & PLAN:    1.  Reactive Leucocytosis  2. Arthritis  3. Sweats at night that are non-bed drenching    Discussion: I suspect the very slight increased percentage of myeloblasts on flow cytometry are reactive given the longevity of the white count in the 10-13,000 range over the last 3 years at least.  I suspect they've been higher for an even longer period of time given the fact that he had seen me in the past for the same issues but I am devoid of my prior records as it was far enough  back that those have been expunged.  If we were going to do anything further it would be a bone marrow biopsy to rule out acute myelogenous leukemia but I think the odds of that is exceedingly low given the longevity and the stability of this white count over time and with his elevated sedimentation rate along with other reactive markers this is highly likely reactive leukocytosis due to arthritis or other inflammatory conditions.  He had prostatitis with active urine sediment and while they're still a few red cells in the urine microscopically, those are clearing in the white cells are gone and the leukocyte esterase is now negative.  I would ask him to follow-up with Dr. Coronel and if his urinalysis repetitively shows red cells then he may need urologic evaluation.  Otherwise, I will have him back to my nurse practitioner in 6 months with a blood count prior to return if the white count is stable over that time then I would simply have him follow with Dr. Coronel for routine blood work.  I see nothing radiographically or on testing to suggest a lymphoproliferative process causing the sweats nor any obvious infectious process clinically that is apparent to me.  Discussed with patient 15 minutes greater than 50% counseling.      Arsen Rosenberg MD    05/22/2018

## 2018-06-11 ENCOUNTER — OFFICE VISIT (OUTPATIENT)
Dept: FAMILY MEDICINE CLINIC | Facility: CLINIC | Age: 73
End: 2018-06-11

## 2018-06-11 VITALS
HEART RATE: 88 BPM | RESPIRATION RATE: 16 BRPM | WEIGHT: 207.4 LBS | TEMPERATURE: 97.5 F | DIASTOLIC BLOOD PRESSURE: 80 MMHG | BODY MASS INDEX: 30.72 KG/M2 | SYSTOLIC BLOOD PRESSURE: 130 MMHG | HEIGHT: 69 IN

## 2018-06-11 DIAGNOSIS — M54.50 CHRONIC BILATERAL LOW BACK PAIN WITHOUT SCIATICA: Primary | ICD-10-CM

## 2018-06-11 DIAGNOSIS — G89.29 CHRONIC BILATERAL LOW BACK PAIN WITHOUT SCIATICA: Primary | ICD-10-CM

## 2018-06-11 PROCEDURE — 99213 OFFICE O/P EST LOW 20 MIN: CPT | Performed by: FAMILY MEDICINE

## 2018-06-11 NOTE — PROGRESS NOTES
Subjective   Antione Douglas is a 73 y.o. male.     Chief Complaint   Patient presents with   • Back Pain     pt stopped seeing Pain Mgmt / Dr. Brown approx 3 months ago     History of Present Illness     Antione comes in today off schedule to discuss his chronic back pain.    Friend of his had surgery with Dr. Mcgregor and did quite well. He would like to see him. Last imaged about 5 years ago. Has been to pain management and wasn't helping very much.    He has suffered from chronic low back pain now for a number of years had been seeing Dr. Brown of pain management for number of years.  I only been peripherally involved with his chronic back pain.  It apparently stems from DJD of the LS spine and cervical spine.  He has had some radicular symptoms although can be confusing at times as he also has significant peripheral neuropathy due to his diabetes.    Dr. Brown had been giving him chronic hydrocodone in addition to injection therapy; that combination has been giving him some significant relief for number of years.  Patient reports the last year or 2 has really not had good relief and in actuality has had worsening of his pain into his legs bilaterally.    The following portions of the patient's history were reviewed and updated as appropriate: allergies, current medications, past medical history and problem list.    Review of Systems   Musculoskeletal: Positive for neck pain. Back pain: denies any bowel or bladder dysfunction.   Skin: Negative.    Neurological: Positive for dizziness and numbness. Negative for weakness and headache.       Objective   Physical Exam   Constitutional: He is oriented to person, place, and time. He appears well-developed. No distress.   Cardiovascular: Normal rate.    Pulmonary/Chest: Effort normal and breath sounds normal.   Abdominal: Soft. Bowel sounds are normal. There is no tenderness.   Musculoskeletal:   Shuffling, antalgic gait; some diffuse, minimal TTP in LS spine  paraspinous muscles; no bony TTP; pain on palp not reproduce his primary pain; ROM is significantly limited with forward flexion and extension   Neurological: He is alert and oriented to person, place, and time. He displays abnormal reflex. A sensory deficit is present. He exhibits normal muscle tone.   Decreased sensation to light touch in a stocking distribution. DTRs a bit asymmetric (left more brisk than right - this seems new)   Skin: Skin is warm and dry.   Psychiatric: He has a normal mood and affect.   Nursing note and vitals reviewed.    Assessment/Plan   Antione was seen today for back pain.    Diagnoses and all orders for this visit:    Chronic bilateral low back pain without sciatica  -     MRI Lumbar Spine Without Contrast; Future  -     Ambulatory Referral to Neurosurgery    Will reimage his low back given the greater than 5 years since his last MRI and worsening pain.  We'll also arrange consultation with Dr. Mcgregor as requested.    Keep appointment as scheduled for next month to follow-up on his routine medical problems.

## 2018-07-05 PROBLEM — I73.9: Status: ACTIVE | Noted: 2018-07-05

## 2018-07-05 NOTE — PROGRESS NOTES
Subjective   Antione Douglas is a 73 y.o. male.     Chief Complaint   Patient presents with   • Follow-up   • Hypertension   • Chronic Kidney Disease   • Diabetes   • Insomnia   • BPH (Dr. Eason)     stopped seeing Dr. Eason several months ago, stopped Tamsulosin   • leukocytosis (Dr. Coelho)     pt goes back to Dr. Coelho in December   • chronic back pain (Dr. Mcgregor)       History of Present Illness     Antione returns today for follow-up.  Saw Dr. Mcgregor earlier this week concerning his back pain.  Recent MRI showed that the arthritis in his back was significantly worse and there is a new disc herniation to the right side with a moderate narrowing of the neuro foramen.Dr Mcgregor did not feel like there was a surgical intervention at this time and recommended increased activity and PT.    He was also concerned about his alcohol intake. Apparently has 4-5 vodka drinks each evening.  Wife asks that this could affect his neuropathy and balance.    Due for some follow-up lab work on his diabetes and chronic kidney disease stage III    History of hematuria.  Seeing urology several months ago and we need to follow up on that.    Leukocytosis-sees Dr. coelho hematology and has his next appointment in December.  He doubts any significant lymphoproliferative disorder    The following portions of the patient's history were reviewed and updated as appropriate: allergies, current medications, past medical history and problem list.    Review of Systems   Constitutional: Negative for unexpected weight loss.   HENT: Negative.    Eyes: Negative for blurred vision.   Respiratory: Negative.    Cardiovascular: Negative.  Negative for chest pain and leg swelling.   Gastrointestinal: Negative.    Endocrine: Negative for polydipsia and polyuria.   Musculoskeletal: Positive for back pain.   Skin: Negative.    Neurological: Positive for numbness (bilateral feet). Negative for dizziness.   Psychiatric/Behavioral: Negative.        Objective    Physical Exam   Constitutional: He is oriented to person, place, and time. He appears well-developed and well-nourished. No distress.   Wife with him today.   Neck: Carotid bruit is not present.   Cardiovascular: Normal rate and regular rhythm.    Pulmonary/Chest: Effort normal and breath sounds normal.   Musculoskeletal: He exhibits no edema.    Antione had a diabetic foot exam performed today.    Neurological Sensory Findings -  Altered sharp/dull right ankle/foot discrimination and altered sharp/dull left ankle/foot discrimination. Right ankle/foot altered proprioception and left ankle/foot altered proprioception.  Skin Integrity  -  His right foot skin is intact.His left foot skin is intact..  Neurological: He is alert and oriented to person, place, and time. He exhibits normal muscle tone.   Skin: Skin is warm and dry.   Healing skin tear on his right arm status post a fall   Psychiatric: He has a normal mood and affect. His behavior is normal.   Nursing note and vitals reviewed.        Assessment/Plan   Antione was seen today for follow-up, hypertension, chronic kidney disease, diabetes, insomnia, bph (dr. lopez), leukocytosis (dr. coelho) and chronic back pain (dr. nascimento).    Diagnoses and all orders for this visit:    Type 2 diabetes mellitus without complication, without long-term current use of insulin (CMS/[facility])  -     Comprehensive Metabolic Panel  -     Hemoglobin A1c    Chronic kidney disease, stage 3  -     Comprehensive Metabolic Panel    Primary insomnia    Chronic bilateral low back pain without sciatica    Peripheral vascular disease of foot (CMS/[facility])    Hematuria, unspecified type  -     Cancel: Urinalysis With Microscopic If Indicated (No Culture) - Urine, Clean Catch  -     Urinalysis With Microscopic - Urine, Clean Catch    Is still showing hematuria will need to be referred back to urology.    Concerning his back-I'd like to address his alcohol use first.  He's can think about it and it really  needs to be his decision to quit.  We can support him with a benzodiazepine and other modalities as necessary.  I don't think he needs inpatient detoxification at this point in time.    Once this is accomplished, get involved in physical therapy and additional pain management activities to include possible injections. He can return to Dr. Reed as soon as able.    I do think his alcohol intake could affect his neuropathy and balance both directly.

## 2018-07-09 ENCOUNTER — OFFICE VISIT (OUTPATIENT)
Dept: NEUROSURGERY | Facility: CLINIC | Age: 73
End: 2018-07-09

## 2018-07-09 VITALS — OXYGEN SATURATION: 97 % | RESPIRATION RATE: 17 BRPM

## 2018-07-09 DIAGNOSIS — M51.34 DDD (DEGENERATIVE DISC DISEASE), THORACIC: Primary | ICD-10-CM

## 2018-07-09 DIAGNOSIS — M51.36 BULGING LUMBAR DISC: ICD-10-CM

## 2018-07-09 PROCEDURE — 99204 OFFICE O/P NEW MOD 45 MIN: CPT | Performed by: NEUROLOGICAL SURGERY

## 2018-07-09 NOTE — PROGRESS NOTES
NAME: ASHLEY MCMAHON   DOS: 2018  : 1945  PCP: Vahe Coronel MD    Chief Complaint:  Back pain  Chief Complaint   Patient presents with   • Back Pain       History of Present Illness:  73 y.o. male   This is a 73-year-old gentleman actually well-known to me I did a left-sided L4 5 far lateral discectomy almost a decade ago on him when I was at the [facility] with relative relief of his left thigh pain    He complains today of chronic axial back pain sedentary lifestyle and difficulty with ambulation he gives no stringent history of neurogenic claudication it's difficult to ascertain secondary to his sedentary nature    He denies any clear-cut radicular complaints that are persistent most of his back pain is axial and he complains more of a generalized mobility issue that he does of leg or sciatic issues.    He also has problems with symptoms of peripheral neuropathy and some cervical spine symptomatology    PMHX  Allergies:  Allergies   Allergen Reactions   • Amitriptyline Other (See Comments)     Caused vision and hearing problems and constipation   • Lyrica [Pregabalin] Confusion     Medications    Current Outpatient Prescriptions:   •  albuterol (PROVENTIL HFA;VENTOLIN HFA) 108 (90 BASE) MCG/ACT inhaler, ProAir  (90 Base) MCG/ACT Inhalation Aerosol Solution; Patient Sig: ProAir  (90 Base) MCG/ACT Inhalation Aerosol Solution ; 0; -Aug-2013; Active, Disp: , Rfl:   •  ALPRAZolam (XANAX) 1 MG tablet, TAKE ONE TABLET BY MOUTH THREE TIMES A DAY AS NEEDED FOR ANXIETY, Disp: 90 tablet, Rfl: 4  •  atorvastatin (LIPITOR) 10 MG tablet, Take 1 tablet by mouth Daily., Disp: 90 tablet, Rfl: 1  •  budesonide-formoterol (SYMBICORT) 160-4.5 MCG/ACT inhaler, Inhale 2 puffs 2 (two) times a day., Disp: , Rfl:   •  DULoxetine (CYMBALTA) 60 MG capsule, , Disp: , Rfl:   •  furosemide (LASIX) 20 MG tablet, 2 po q am and one po q pm, Disp: 270 tablet, Rfl: 1  •  glucose blood (BRODERICK CONTOUR  TEST) test strip, Test once daily  DX E11.8, Disp: 100 each, Rfl: 1  •  HYDROcodone-acetaminophen (NORCO)  MG per tablet, , Disp: , Rfl:   •  ipratropium (ATROVENT) 0.06 % nasal spray, 2 sprays into each nostril 4 (Four) Times a Day., Disp: 1 each, Rfl: 3  •  omeprazole (priLOSEC) 20 MG capsule, Take 1 capsule by mouth Daily., Disp: 90 capsule, Rfl: 3  •  OXcarbazepine (TRILEPTAL) 300 MG tablet, , Disp: , Rfl:   •  SITagliptin (JANUVIA) 100 MG tablet, Take 1 tablet by mouth Daily. (Patient taking differently: Take 50 mg by mouth Daily.), Disp: 28 tablet, Rfl: 0  •  tamsulosin (FLOMAX) 0.4 MG capsule 24 hr capsule, , Disp: , Rfl:   Past Medical History:  Past Medical History:   Diagnosis Date   • Allergic rhinitis    • Anxiety    • Chronic neck pain    • COPD (chronic obstructive pulmonary disease) (CMS/HCC)    • Depression    • Diabetes mellitus (CMS/HCC)    • Duodenal adenoma    • Low back pain    • Sleep apnea     intolerant to CPAP     Past Surgical History:  Past Surgical History:   Procedure Laterality Date   • BACK SURGERY  07/29/2009    Left minimally invasive L4-5 far lateral lumbar discectomy Dr. Levi Mcgregor ([facility])   • EYE SURGERY     • SINUS SURGERY     • SPINE SURGERY       Social Hx:  Social History   Substance Use Topics   • Smoking status: Former Smoker   • Smokeless tobacco: Never Used      Comment: quit 10 years ago   • Alcohol use Yes      Comment: rare     Family Hx:  Family History   Problem Relation Age of Onset   • Coronary artery disease Mother    • Hypertension Mother    • Pancreatic cancer Father    • Coronary artery disease Father      Review of Systems:        Review of Systems   Constitutional: Positive for chills, diaphoresis and fatigue. Negative for activity change, appetite change, fever and unexpected weight change.   HENT: Negative for congestion, dental problem, drooling, ear discharge, ear pain, facial swelling, hearing loss, mouth sores, nosebleeds, postnasal  drip, rhinorrhea, sinus pressure, sneezing, sore throat, tinnitus, trouble swallowing and voice change.    Eyes: Negative for photophobia, pain, discharge, redness, itching and visual disturbance.   Respiratory: Positive for shortness of breath. Negative for apnea, cough, choking, chest tightness, wheezing and stridor.    Cardiovascular: Negative for chest pain, palpitations and leg swelling.   Gastrointestinal: Positive for constipation. Negative for abdominal distention, abdominal pain, anal bleeding, blood in stool, diarrhea, nausea, rectal pain and vomiting.   Endocrine: Positive for cold intolerance. Negative for heat intolerance, polydipsia, polyphagia and polyuria.   Genitourinary: Positive for enuresis. Negative for decreased urine volume, difficulty urinating, dysuria, flank pain, frequency, genital sores, hematuria and urgency.   Musculoskeletal: Positive for back pain, gait problem, neck pain and neck stiffness. Negative for arthralgias, joint swelling and myalgias.   Skin: Positive for wound. Negative for color change, pallor and rash.   Allergic/Immunologic: Negative for environmental allergies, food allergies and immunocompromised state.   Neurological: Positive for dizziness, weakness, light-headedness and numbness. Negative for tremors, seizures, syncope, facial asymmetry, speech difficulty and headaches.   Hematological: Negative for adenopathy. Does not bruise/bleed easily.   Psychiatric/Behavioral: Positive for decreased concentration and dysphoric mood. Negative for agitation, behavioral problems, confusion, hallucinations, self-injury, sleep disturbance and suicidal ideas. The patient is nervous/anxious. The patient is not hyperactive.    All other systems reviewed and are negative.     I have reviewed this note template and all pertinent parts of the review of systems social, family history, surgical history and medication list      Physical Examination:  Vitals:    07/09/18 1433   Resp: 17    SpO2: 97%      General Appearance:   Well developed, Relative anemic looking, well groomed, alert, and cooperative.  Neurological examination:  Neurologic Exam  Vital signs were reviewed and documented in the chart  Patient appeared in good neurologic function with normal comprehension fluent speech  Mood and affect are normal  Sense of smell deferred  He has multiple purpura over his body  Pupils symmetric equally reactive funduscopic exam not visualized   Visual fields intact to confrontation  Extraocular movements intact  Face motor function is symmetric  Facial sensations normal  Hearing intact to finger rub hearing intact to finger rub  Tongue is midline  Palate symmetric  Swallowing normal  Shoulder shrug normal    Muscle bulk and tone normal  5 out of 5 strength no motor drift  Gait normal semi-parkinsonian gait stooped forward posture with mild fine motor tremor    Unsteady positive Romberg  No clonus long tract signs or myelopathy    Reflexes symmetric diminished throughout absent at the ankles  No edema noted and extremities skin appears normal with purpura  His prior surgical incisions well-healed  Straight leg raise sign absent  No signs of intrinsic hip dysfunction  Back is without any lesions or abnormality  Feet are warm and well perfused he has decreased vibratory sensation    He has some cogwheeling rigidity    Review of Imaging/DATA:  His MRI shows multiple levels of congenital canal stenosis nothing out of consider surgical at the present moment  Diagnoses/Plan:    Mr. Douglas is a 73 y.o. male   This gentleman suffers from chronic axial back pain and in discussions with he and his wife what is interesting is the gentleman really is a heavy drinker he sleeps until proximally 3 or 4 in afternoon daily and will drink 3-4 glasses of I can nightly.  He doesn't participate in any sort of physical therapy exercise are conditioning and in looking at him he almost looks parkinsonian but his generalized  mobility concern is the biggest issue his wife he does have axial back pain I think he can be treated with physical therapy.  I would treat him nonsurgical and recommended counseling and potentially some detox with strength and conditioning training he seems open to this.  I will see him back as needed spent about 45 minutes to an hour with the family

## 2018-07-12 ENCOUNTER — OFFICE VISIT (OUTPATIENT)
Dept: FAMILY MEDICINE CLINIC | Facility: CLINIC | Age: 73
End: 2018-07-12

## 2018-07-12 VITALS
WEIGHT: 207.6 LBS | RESPIRATION RATE: 16 BRPM | HEART RATE: 84 BPM | BODY MASS INDEX: 30.75 KG/M2 | TEMPERATURE: 98.3 F | DIASTOLIC BLOOD PRESSURE: 80 MMHG | HEIGHT: 69 IN | SYSTOLIC BLOOD PRESSURE: 140 MMHG

## 2018-07-12 DIAGNOSIS — N18.30 CHRONIC KIDNEY DISEASE, STAGE 3 (HCC): ICD-10-CM

## 2018-07-12 DIAGNOSIS — R31.9 HEMATURIA, UNSPECIFIED TYPE: ICD-10-CM

## 2018-07-12 DIAGNOSIS — I73.9 PERIPHERAL VASCULAR DISEASE OF FOOT (HCC): ICD-10-CM

## 2018-07-12 DIAGNOSIS — E11.9 TYPE 2 DIABETES MELLITUS WITHOUT COMPLICATION, WITHOUT LONG-TERM CURRENT USE OF INSULIN (HCC): Primary | ICD-10-CM

## 2018-07-12 DIAGNOSIS — F51.01 PRIMARY INSOMNIA: ICD-10-CM

## 2018-07-12 DIAGNOSIS — M54.50 CHRONIC BILATERAL LOW BACK PAIN WITHOUT SCIATICA: ICD-10-CM

## 2018-07-12 DIAGNOSIS — G89.29 CHRONIC BILATERAL LOW BACK PAIN WITHOUT SCIATICA: ICD-10-CM

## 2018-07-12 LAB
ALBUMIN SERPL-MCNC: 4.46 G/DL (ref 3.2–4.8)
ALBUMIN/GLOB SERPL: 1.5 G/DL (ref 1.5–2.5)
ALP SERPL-CCNC: 98 U/L (ref 25–100)
ALT SERPL-CCNC: 27 U/L (ref 7–40)
APPEARANCE UR: CLEAR
AST SERPL-CCNC: 28 U/L (ref 0–33)
BACTERIA #/AREA URNS HPF: NORMAL /HPF
BILIRUB SERPL-MCNC: 0.4 MG/DL (ref 0.3–1.2)
BILIRUB UR QL STRIP: NEGATIVE
BUN SERPL-MCNC: 18 MG/DL (ref 9–23)
BUN/CREAT SERPL: 13.8 (ref 7–25)
CALCIUM SERPL-MCNC: 9.2 MG/DL (ref 8.7–10.4)
CASTS URNS MICRO: NORMAL
CHLORIDE SERPL-SCNC: 106 MMOL/L (ref 99–109)
CO2 SERPL-SCNC: 26 MMOL/L (ref 20–31)
COLOR UR: YELLOW
CREAT SERPL-MCNC: 1.3 MG/DL (ref 0.6–1.3)
CRYSTALS URNS MICRO: NORMAL
EPI CELLS #/AREA URNS HPF: NORMAL /HPF
GLOBULIN SER CALC-MCNC: 3 GM/DL
GLUCOSE SERPL-MCNC: 138 MG/DL (ref 70–100)
GLUCOSE UR QL: NEGATIVE
HBA1C MFR BLD: 6.6 % (ref 4.8–5.6)
HGB UR QL STRIP: NEGATIVE
KETONES UR QL STRIP: NEGATIVE
LEUKOCYTE ESTERASE UR QL STRIP: NEGATIVE
NITRITE UR QL STRIP: NEGATIVE
PH UR STRIP: 6 [PH] (ref 5–8)
POTASSIUM SERPL-SCNC: 3.7 MMOL/L (ref 3.5–5.5)
PROT SERPL-MCNC: 7.5 G/DL (ref 5.7–8.2)
PROT UR QL STRIP: (no result)
RBC #/AREA URNS HPF: NORMAL /HPF
SODIUM SERPL-SCNC: 142 MMOL/L (ref 132–146)
SP GR UR: 1.02 (ref 1–1.03)
UROBILINOGEN UR STRIP-MCNC: (no result) MG/DL
WBC #/AREA URNS HPF: NORMAL /HPF

## 2018-07-12 PROCEDURE — 99214 OFFICE O/P EST MOD 30 MIN: CPT | Performed by: FAMILY MEDICINE

## 2018-09-26 ENCOUNTER — OFFICE VISIT (OUTPATIENT)
Dept: FAMILY MEDICINE CLINIC | Facility: CLINIC | Age: 73
End: 2018-09-26

## 2018-09-26 VITALS
HEART RATE: 109 BPM | DIASTOLIC BLOOD PRESSURE: 76 MMHG | TEMPERATURE: 96.9 F | OXYGEN SATURATION: 93 % | WEIGHT: 206 LBS | RESPIRATION RATE: 20 BRPM | SYSTOLIC BLOOD PRESSURE: 124 MMHG | BODY MASS INDEX: 30.2 KG/M2

## 2018-09-26 DIAGNOSIS — J43.1 PANLOBULAR EMPHYSEMA (HCC): Primary | ICD-10-CM

## 2018-09-26 DIAGNOSIS — J02.8 PHARYNGITIS DUE TO OTHER ORGANISM: ICD-10-CM

## 2018-09-26 PROCEDURE — 99213 OFFICE O/P EST LOW 20 MIN: CPT | Performed by: NURSE PRACTITIONER

## 2018-09-26 RX ORDER — METHYLPREDNISOLONE 4 MG/1
TABLET ORAL
Qty: 21 TABLET | Refills: 0 | Status: SHIPPED | OUTPATIENT
Start: 2018-09-26 | End: 2019-03-18

## 2018-09-26 RX ORDER — AMOXICILLIN AND CLAVULANATE POTASSIUM 875; 125 MG/1; MG/1
1 TABLET, FILM COATED ORAL 2 TIMES DAILY
Qty: 14 TABLET | Refills: 0 | Status: SHIPPED | OUTPATIENT
Start: 2018-09-26 | End: 2019-02-15 | Stop reason: SDUPTHER

## 2018-09-26 NOTE — PROGRESS NOTES
Subjective   Antione Douglas is a 73 y.o. male.     History of Present Illness   ST and cough, feeling short of air, wheezing worse at night  Night sweats runny nose,   No body aches or HA, no dizziness  Using inhaler  Dry needling     The following portions of the patient's history were reviewed and updated as appropriate: allergies, current medications, past family history, past medical history, past social history, past surgical history and problem list.    Review of Systems   Constitutional: Negative for activity change, appetite change, chills, fatigue and fever.   HENT: Positive for congestion, postnasal drip, rhinorrhea, sinus pressure and sore throat. Negative for sneezing, trouble swallowing and voice change.    Eyes: Negative for redness and itching.   Respiratory: Positive for cough and wheezing. Negative for chest tightness and shortness of breath.    Cardiovascular: Negative.    Gastrointestinal: Negative.  Negative for abdominal pain, nausea and vomiting.   Endocrine: Negative.    Genitourinary: Negative.    Musculoskeletal: Negative.  Negative for arthralgias and myalgias.   Skin: Negative.  Negative for rash.   Allergic/Immunologic: Negative.  Negative for environmental allergies.   Neurological: Negative for dizziness, weakness and light-headedness.   Hematological: Negative for adenopathy.   Psychiatric/Behavioral: Negative.  Negative for sleep disturbance.       Objective   Physical Exam   Constitutional: He is oriented to person, place, and time. He appears well-developed and well-nourished.   HENT:   Head: Normocephalic.   Right Ear: Tympanic membrane, external ear and ear canal normal. Tympanic membrane is not erythematous.   Left Ear: Tympanic membrane, external ear and ear canal normal. Tympanic membrane is not erythematous.   Nose: Nose normal. No mucosal edema or rhinorrhea. Right sinus exhibits no maxillary sinus tenderness and no frontal sinus tenderness. Left sinus exhibits no maxillary  sinus tenderness and no frontal sinus tenderness.   Mouth/Throat: Uvula is midline and mucous membranes are normal. Posterior oropharyngeal erythema present. No oropharyngeal exudate.   Eyes: EOM and lids are normal. Right conjunctiva is injected (mildly). Left conjunctiva is injected.   Cardiovascular: Normal rate, regular rhythm, S1 normal, S2 normal and normal heart sounds.    Pulmonary/Chest: Effort normal. No respiratory distress. He has decreased breath sounds in the right upper field, the right middle field, the right lower field, the left upper field, the left middle field and the left lower field. He has no wheezes. He has no rhonchi. He has no rales.   Lymphadenopathy:     He has no cervical adenopathy.   Neurological: He is alert and oriented to person, place, and time.   Skin: Skin is warm and dry.   Psychiatric: He has a normal mood and affect. His speech is normal and behavior is normal. Judgment and thought content normal. Cognition and memory are normal.   Nursing note and vitals reviewed.        Assessment/Plan   Antione was seen today for sore throat and cough.    Diagnoses and all orders for this visit:    Panlobular emphysema (CMS/HCC)    Pharyngitis due to other organism    Other orders  -     amoxicillin-clavulanate (AUGMENTIN) 875-125 MG per tablet; Take 1 tablet by mouth 2 (Two) Times a Day.  -     MethylPREDNISolone (MEDROL, AIDE,) 4 MG tablet; Take as directed on package instructions.      Warm saline gargles and throat lozenges  Take antibiotics and steroids as directed

## 2018-10-08 DIAGNOSIS — E78.5 HYPERLIPIDEMIA, UNSPECIFIED HYPERLIPIDEMIA TYPE: ICD-10-CM

## 2018-10-08 RX ORDER — ATORVASTATIN CALCIUM 10 MG/1
TABLET, FILM COATED ORAL
Qty: 90 TABLET | Refills: 0 | Status: SHIPPED | OUTPATIENT
Start: 2018-10-08 | End: 2019-01-03 | Stop reason: SDUPTHER

## 2018-10-16 ENCOUNTER — FLU SHOT (OUTPATIENT)
Dept: FAMILY MEDICINE CLINIC | Facility: CLINIC | Age: 73
End: 2018-10-16

## 2018-10-16 PROCEDURE — G0008 ADMIN INFLUENZA VIRUS VAC: HCPCS | Performed by: FAMILY MEDICINE

## 2018-10-16 PROCEDURE — 90662 IIV NO PRSV INCREASED AG IM: CPT | Performed by: FAMILY MEDICINE

## 2018-11-25 DIAGNOSIS — F41.1 GENERALIZED ANXIETY DISORDER: ICD-10-CM

## 2018-11-26 RX ORDER — ALPRAZOLAM 1 MG/1
TABLET ORAL
Qty: 90 TABLET | Refills: 1 | Status: SHIPPED | OUTPATIENT
Start: 2018-11-26 | End: 2019-03-18 | Stop reason: SDUPTHER

## 2018-12-17 ENCOUNTER — LAB REQUISITION (OUTPATIENT)
Dept: LAB | Facility: HOSPITAL | Age: 73
End: 2018-12-17

## 2018-12-17 DIAGNOSIS — Z00.00 ROUTINE GENERAL MEDICAL EXAMINATION AT A HEALTH CARE FACILITY: ICD-10-CM

## 2018-12-17 PROCEDURE — 36415 COLL VENOUS BLD VENIPUNCTURE: CPT | Performed by: INTERNAL MEDICINE

## 2018-12-19 ENCOUNTER — OFFICE VISIT (OUTPATIENT)
Dept: ONCOLOGY | Facility: CLINIC | Age: 73
End: 2018-12-19

## 2018-12-19 VITALS
BODY MASS INDEX: 30.51 KG/M2 | HEIGHT: 69 IN | TEMPERATURE: 97.4 F | HEART RATE: 88 BPM | SYSTOLIC BLOOD PRESSURE: 156 MMHG | RESPIRATION RATE: 20 BRPM | WEIGHT: 206 LBS | DIASTOLIC BLOOD PRESSURE: 85 MMHG

## 2018-12-19 DIAGNOSIS — D72.829 LEUKOCYTOSIS, UNSPECIFIED TYPE: Primary | ICD-10-CM

## 2018-12-19 PROCEDURE — 99213 OFFICE O/P EST LOW 20 MIN: CPT | Performed by: NURSE PRACTITIONER

## 2018-12-19 RX ORDER — DULOXETIN HYDROCHLORIDE 30 MG/1
CAPSULE, DELAYED RELEASE ORAL
COMMUNITY
Start: 2018-12-06 | End: 2019-05-23

## 2018-12-19 NOTE — PROGRESS NOTES
CHIEF COMPLAINT: Reactive leukocytosis    Problem List:     Leukocytosis    5/8/2018 Initial Diagnosis     Reactive Leukocytosis: Patient saw me for first time 5/8/18 for white count of 14,700 on 4/24/18.  White count 13,760 week prior.  When I reviewed his records he had a white count as high as 11,980 in September 2015 and 11,090 in July 2015.  As chronic MCV elevation to the mid 100s but presently is normal at 97 with unremarkable differential.  I'm asked to see him due to this leukocytosis.  He does have a problem with chronic prostatitis and has recently been treated with Cipro in April 2018.  Apparently I had seen in previous years for similar issue though I do not have his medical records.  He had the flu in February 2018 and has been periodically cold and hot with subnormal temperatures at times.  No weight loss but actually gaining weight.  Just feels running out and tired all the time.  Came to me because of the modest elevation in white count and feeling run down.  -Labs 5/8/18: White count 12,230 with unremarkable peripheral smear on pathologic review.  Sedimentation rate elevated to 43 with C-reactive protein 3.46.  BCR ABL negative.  Flow cytometry showing small population of 0.5% myeloblasts which could be reactive but could not entirely rule out myeloid malignancy.  TSH normal 2.4.  Slight elevation of IgA but no clearcut monoclonal gammopathy.  CMV and EBV PCR were negative.  Urinalysis showed clearing of leukocyte esterase and white cells suggesting treated prostatitis or urinary tract infection.         5/16/2018 Imaging     Echocardiogram ejection fraction 55%  CT noncontrast chest abdomen pelvis negative            HISTORY OF PRESENT ILLNESS:  The patient is a 73 y.o. male, here for follow up on management of Leukocytosis.  Continues to have significant discomfort from his arthritis.  No change in his health since we saw him last.  No illnesses or infections, no fevers or chills.    Past Medical  History:   Diagnosis Date   • Allergic rhinitis    • Anxiety    • Chronic neck pain    • COPD (chronic obstructive pulmonary disease) (CMS/HCC)    • Depression    • Diabetes mellitus (CMS/HCC)    • Duodenal adenoma    • Low back pain    • Sleep apnea     intolerant to CPAP     Past Surgical History:   Procedure Laterality Date   • BACK SURGERY  07/29/2009    Left minimally invasive L4-5 far lateral lumbar discectomy Dr. Levi Mcgregor ([facility])   • EYE SURGERY     • SINUS SURGERY     • SPINE SURGERY         Allergies   Allergen Reactions   • Amitriptyline Other (See Comments)     Caused vision and hearing problems and constipation   • Lyrica [Pregabalin] Confusion       Family History and Social History reviewed and changed as necessary      REVIEW OF SYSTEM:   Review of Systems   Constitutional: Negative for appetite change, chills, diaphoresis, fatigue, fever and unexpected weight change.   HENT:   Negative for mouth sores, sore throat and trouble swallowing.    Eyes: Negative for icterus.   Respiratory: Negative for cough, hemoptysis and shortness of breath.    Cardiovascular: Negative for chest pain, leg swelling and palpitations.   Gastrointestinal: Negative for abdominal distention, abdominal pain, blood in stool, constipation, diarrhea, nausea and vomiting.   Endocrine: Negative for hot flashes.   Genitourinary: Negative for bladder incontinence, difficulty urinating, dysuria, frequency and hematuria.    Musculoskeletal: Negative for gait problem, neck pain and neck stiffness.   Skin: Negative for rash.   Neurological: Positive for dizziness, gait instability.  Negative for headaches, light-headedness and numbness.  Positive for peripheral neuropathy.  Hematological: Negative for adenopathy. Does bruise/bleed easily.   Psychiatric/Behavioral: Negative for depression. The patient is not nervous/anxious.    All other systems reviewed and are negative.       PHYSICAL EXAM    Vitals:    12/19/18 1449   BP:  "156/85   Pulse: 88   Resp: 20   Temp: 97.4 °F (36.3 °C)   Weight: 93.4 kg (206 lb)   Height: 175.9 cm (69.25\")     Constitutional: Chronically ill appearing, frail gentleman. No distress.   ECOG: (1) Restricted in physically strenuous activity, ambulatory and able to do work of light nature  HENT:   Head: Normocephalic.   Mouth/Throat: Oropharynx is clear and moist.   Eyes: Conjunctivae are normal. Pupils are equal, round, and reactive to light. No scleral icterus.   Neck: Neck supple. No JVD present. No thyromegaly present.   Cardiovascular: Normal rate, regular rhythm and normal heart sounds.    Pulmonary/Chest: Breath sounds normal. No respiratory distress.   Abdominal: Soft. Exhibits no distension.   Musculoskeletal:Exhibits no edema, tenderness or deformity. Ambulates with a cane.  Neurological: Alert and oriented to person, place, and time.   Skin: No petechiae and no rash noted. Not diaphoretic. No cyanosis.   multiple areas of ecchymosis on his upper extremities, skin is in with senile changes.    Psychiatric: Normal mood and affect.   Vitals reviewed.  Labs reviewed.     CBC 12/17/2018 WBC 11,250, hemoglobin 15.3, hematocrit 47.5%, platelet count 314,000, ANC 6.86.    ASSESSMENT & PLAN:    1. Reactive Leucocytosis  2. Arthritis    Discussion: His leucocytosis is most assuredly reactive given the longevity of the white count in the 10-13,000 range over the last 3 years at least.  I suspect they've been higher for an even longer period of time given the fact that he had seen me in the past for the same issues but I am devoid of prior records as it was far enough back that those have been expunged.  If we were going to do anything further it would be a bone marrow biopsy to rule out acute myelogenous leukemia but I think the odds of that is exceedingly low given the longevity and the stability of this white count over time and with his elevated sedimentation rate along with other reactive markers this is " highly likely reactive leukocytosis due to arthritis or other inflammatory conditions.  At this time we will discontinue routine hematological follow-up and have him follow with Dr. Coronel for routine blood work.  We will be glad to see him in the future if there are any new concerns.  It has been a pleasure participating in the care of this delightful gentleman.    I spent 15 minutes with the patient. I spent > 50% percent of this time counseling and discussing prognosis, diagnostic testing, evaluation, current status and management.    Kianna Balderas, APRN    12/19/2018

## 2018-12-27 ENCOUNTER — TELEPHONE (OUTPATIENT)
Dept: FAMILY MEDICINE CLINIC | Facility: CLINIC | Age: 73
End: 2018-12-27

## 2018-12-27 NOTE — TELEPHONE ENCOUNTER
----- Message from Sujey Angela sent at 12/27/2018  2:06 PM EST -----  PATIENTS WIFE IS CALLING TO REQUEST SAMPLES OF JANUVIA 100MG?  [id number]

## 2018-12-28 ENCOUNTER — TELEPHONE (OUTPATIENT)
Dept: FAMILY MEDICINE CLINIC | Facility: CLINIC | Age: 73
End: 2018-12-28

## 2018-12-28 RX ORDER — LACTULOSE 10 G/10G
10 SOLUTION ORAL DAILY PRN
Qty: 15 EACH | Refills: 0 | Status: SHIPPED | OUTPATIENT
Start: 2018-12-28 | End: 2021-02-10

## 2018-12-28 NOTE — TELEPHONE ENCOUNTER
Temporary supply of Lactulose sent to pharmacy. This can take 24-48 hours to take effect.  Make sure he is staying hydrated.

## 2018-12-28 NOTE — TELEPHONE ENCOUNTER
----- Message from Barbara Navarro sent at 12/28/2018 10:24 AM EST -----  Contact: HomerMaggie wife  Patient fell and broke 3 ribs and was given hydrocodone by the ER, it has caused him to be constipated, he has tried laxatives, magnesium citrate, metamucil and nothing is working, can we prescribe something? Sonal tony, [phone] may leave a message

## 2019-01-03 DIAGNOSIS — E78.5 HYPERLIPIDEMIA, UNSPECIFIED HYPERLIPIDEMIA TYPE: ICD-10-CM

## 2019-01-03 RX ORDER — ATORVASTATIN CALCIUM 10 MG/1
TABLET, FILM COATED ORAL
Qty: 90 TABLET | Refills: 0 | Status: SHIPPED | OUTPATIENT
Start: 2019-01-03 | End: 2019-04-26 | Stop reason: SDUPTHER

## 2019-02-01 NOTE — TELEPHONE ENCOUNTER
General


Chief Complaint:  Substance Abuse


Stated Complaint:  WEAKNESS


Source:  family, EMS


Exam Limitations:  intoxication





History of Present Illness


Date Seen by Provider:  Feb 1, 2019


Time Seen by Provider:  20:00


Initial Comments


PT ARRIVES VIA EMS


PT IS INTOXICATED AND HAS BEEN VOMITING SO FRIENDS CALLED EMS


EMS REPORT THAT PT WAS OUTSIDE WITH HER FRIENDS, AND FRIENDS HAVE BEEN WITH HER 

THE ENTIRE TIME--NO SUSPICION OF ANYTHING PUT INTO HER DRINK


PT STATES SHE HAS BEEN DRINKING CAPTAIN TREJO RUM AND LONG goCatch ICE TEA--

STATES SHE HAS HAD A PINT OR A FIFTH OF CAPTEDIE TREJO


PT STATES SHE DRINKS ONCE A MONTH ON AVERAGE


PT STATES SHE HAS VOMITED 2-3 TIMES


NO EVIDENCE OF ASPIRATION


NO INJURY


PT IS ANXIOUS AND HYPERVENTILATING ON ARRIVAL AND STATES HER FEET FEEL A LITTLE 

TINGLY AND HER CHEST FEELS A LITTLE TIGHT. 


PT HAS HISTORY OF ANXIETY AND TAKES ZOLOFT


EMS GAVE FLUIDS AND ZOFRAN 4 MG


PT STATES SHE STILL FEELS A LITTLE NAUSEATED. 





PCP: NONE. 


IT IS HERE FOR [facility] AND WERE REPORTEDLY CELEBRATING


PT IS FROM [facility].





Allergies and Home Medications


Allergies


Coded Allergies:  


     No Known Drug Allergies (Unverified , 2/1/19)





Patient Home Medication List


Home Medication List Reviewed:  Yes





Review of Systems


Constitutional:  no symptoms reported


EENTM:  no symptoms reported


Respiratory:  no symptoms reported


Cardiovascular:  see HPI


Gastrointestinal:  see HPI; No abdominal pain; vomiting


Genitourinary:  no symptoms reported


Pregnant:  No


LMP:  Jan 1, 2019


Birth Control/STD Prophylaxis:  BC Pills


Musculoskeletal:  no symptoms reported


Skin:  no symptoms reported


Psychiatric/Neurological:  See HPI, Anxiety, Tingling





Past Medical-Social-Family Hx


Patient Social History


Alcohol Use:  Occasionally Uses (HEAVY AT TIMES)


Recreational Drug Use:  No


Smoking Status:  Never a Smoker


Recent Foreign Travel:  No


Contact w/Someone Who Travel:  No





Past Medical History


Surgeries:  No


Respiratory:  No


Cardiac:  No


Neurological:  No


Pregnant:  No


Genitourinary:  No


Gastrointestinal:  No


Musculoskeletal:  No


Endocrine:  No


HEENT:  No


Cancer:  No


Psychosocial:  Yes


Anxiety


Integumentary:  No


Blood Disorders:  No





Physical Exam





Vital Signs - First Documented








 2/1/19 2/1/19





 20:04 22:22


 


Temp 95.7 


 


Pulse 66 


 


Resp 20 


 


B/P (MAP) 120/78 


 


Pulse Ox  99





Capillary Refill :


Height, Weight, BMI


Height: '"


Weight: lbs. oz. kg;  BMI


Method:


General Appearance:  WD/WN, no apparent distress, other (REEKS OF ETOH, VOMIT 

ON CLOTHINGL SPEECH SLIGHTLY SLOW BUT CLEAR. ANXIOUS AND HYPERVENTILATING ON 

ARRIVAL. PT WAS ABLE TO CALM QUICKLY ON ARRIVAL)


HEENT:  PERRL/EOMI


Neck:  normal inspection


Respiratory:  normal breath sounds, no respiratory distress, no accessory 

muscle use


Cardiovascular:  regular rate, rhythm, no murmur


Gastrointestinal:  normal bowel sounds, non tender, soft


Extremities:  normal inspection, normal capillary refill


Neurologic/Psychiatric:  CNs II-XII nml as tested, no motor/sensory deficits, 

alert, oriented x 3, other (ANXIOUS AND HYPERVENTILATING)


Appearance/Memory:  disheveled


Behavior/Eye Contact:  cooperative, good eye contact, normal speech


Thoughts/Hallucinations:  no apparent hallucination


Skin:  normal color, warm/dry, tattoos/piercings





Progress/Results/Core Measures


Results/Orders


Lab Results





My Orders





Medications Given in ED





Vital Signs/I&O








Progress


Progress Note :  


Progress Note


SPEECH CLEAR AND GAIT STEADY AT DISMISSAL.





Departure


Impression





 Primary Impression:  


 Alcohol intoxication


 Additional Impression:  


 Hypokalemia


Disposition:  01 HOME, SELF-CARE


Condition:  Stable





Departure-Patient Inst.


Referrals:  


NO,LOCAL PHYSICIAN (PCP)


Primary Care Physician


Patient Instructions:  Alcohol Abuse and Alcoholism (DC)





Add. Discharge Instructions:  


DRINK EQUAL AMOUNTS OF WATER AND GATORADE





NO ALCOHOL!!





FOLLOW UP WITH  OF CHOICE AS NEEDED





All discharge instructions reviewed with patient and/or family. Voiced 

understanding.











ERICH LUIS DO Feb 1, 2019 20:31 Informed pt and his wife

## 2019-02-15 ENCOUNTER — TELEPHONE (OUTPATIENT)
Dept: FAMILY MEDICINE CLINIC | Facility: CLINIC | Age: 74
End: 2019-02-15

## 2019-02-15 RX ORDER — AMOXICILLIN AND CLAVULANATE POTASSIUM 875; 125 MG/1; MG/1
1 TABLET, FILM COATED ORAL 2 TIMES DAILY
Qty: 20 TABLET | Refills: 0 | Status: SHIPPED | OUTPATIENT
Start: 2019-02-15 | End: 2019-03-18

## 2019-02-15 NOTE — TELEPHONE ENCOUNTER
----- Message from Ruthy Nielson sent at 2/15/2019  2:04 PM EST -----  Contact: maribel; med request   Pt called in stating that he has an ear infection and sinus infection and would like to know if something could be called in for him.     Pharmacy .     [facility] pharmacy      Advised he might have to be seen for rx.

## 2019-03-17 PROBLEM — E78.2 MIXED HYPERLIPIDEMIA: Status: ACTIVE | Noted: 2019-03-17

## 2019-03-17 NOTE — PROGRESS NOTES
Subjective   Antione Douglas is a 74 y.o. male.   Chief Complaint   Patient presents with   • check two facial lesions   • bilateral leg pain     pt stopped seeing Pain Mgmt last Summer   • RF: Xanax & Omeprazole     History of Present Illness     Patient returns today for follow-up of chronic medical conditions as noted above.      Antione has a couple of places on his face he would like frozen and he has some questions about his medications.    This is on his face have been there a few months;     Quit drinking all alcohol in December - is now down 19 pounds and feeling better; still with pain issues in legs    Last saw his hematologist in December.  They felt like his leukocytosis was most assuredly reactive given the longevity of his white count in the 10-13,000 range over the last 3 years.  They felt like at this time they would discontinue routine hematologic follow-up and have asked to provide routine blood work in our office.    Not seen Dr. Reed for pain management since last summer    Had his annual eye exam in October 2018    Overdue for follow-up blood work on his diabetes and hyperlipidemia.    The following portions of the patient's history were reviewed and updated as appropriate: allergies, current medications, past medical history, past social history and problem list.    Review of Systems   Constitutional: Negative for unexpected weight loss.   HENT: Negative.    Eyes: Negative for blurred vision.   Respiratory: Negative.    Cardiovascular: Negative.  Negative for chest pain and leg swelling.   Gastrointestinal: Negative.    Endocrine: Negative for polydipsia and polyuria.   Musculoskeletal: Positive for back pain.   Skin: Positive for skin lesions. Dry skin: see HPI.   Neurological: Positive for numbness (bilateral feet). Negative for dizziness.   Psychiatric/Behavioral: Negative.        Objective   Physical Exam   Constitutional: He is oriented to person, place, and time. He appears well-developed  and well-nourished. No distress.   Wife with him today.   Eyes: No scleral icterus.   Neck: No JVD present. Carotid bruit is not present.   Cardiovascular: Normal rate and regular rhythm.   Pulmonary/Chest: Effort normal and breath sounds normal. He has no wheezes.   Musculoskeletal: He exhibits no edema.    Antione had a diabetic foot exam performed today.    Neurological Sensory Findings -  Altered sharp/dull right ankle/foot discrimination and altered sharp/dull left ankle/foot discrimination. Right ankle/foot altered proprioception and left ankle/foot altered proprioception.  Skin Integrity  -  His right foot skin is intact.His left foot skin is intact..  Lymphadenopathy:     He has no cervical adenopathy.   Neurological: He is alert and oriented to person, place, and time. He exhibits normal muscle tone.   Skin: Skin is warm and dry.   Face: irritated seb keratosis right sideburn area about 1/2 cm in diam; 1/3 diameter dryer seb keratosis left jaw / neck area   Psychiatric: He has a normal mood and affect. His behavior is normal.   Nursing note and vitals reviewed.        Assessment/Plan   Antione was seen today for check two facial lesions, bilateral leg pain and rf: xanax & omeprazole.    Diagnoses and all orders for this visit:    Type 2 diabetes mellitus without complication, without long-term current use of insulin (CMS/[facility])  -     Hemoglobin A1c    Essential hypertension  -     Comprehensive Metabolic Panel    Chronic kidney disease, stage 3 (CMS/[facility])  -     Comprehensive Metabolic Panel    Mixed hyperlipidemia  -     Lipid Panel With LDL / HDL Ratio    Generalized anxiety disorder  -     ALPRAZolam (XANAX) 1 MG tablet; for anxiety    Pain in both lower extremities  -     imipramine (TOFRANIL) 25 MG tablet; Take 1 tablet by mouth Every Night.    Seborrheic keratoses, inflamed    Other orders  -     omeprazole (priLOSEC) 20 MG capsule; Take 1 capsule by mouth Daily.      Facial lesions - The noted lesions were  frozen using liquid nitrogen and the freeze refreeze technique ×3 (15 seconds each).  Normal postprocedure instructions given.  Explained that possible blistering could occur.  Return to clinic in 4-6 weeks for refreeze if lesions are not gone.    I'm excited that he has quit drinking and has lost nearly 20 pounds; labs as noted. Recheck with me in six months             Vahe Coronel MD  03/18/2019

## 2019-03-18 ENCOUNTER — OFFICE VISIT (OUTPATIENT)
Dept: FAMILY MEDICINE CLINIC | Facility: CLINIC | Age: 74
End: 2019-03-18

## 2019-03-18 ENCOUNTER — TELEPHONE (OUTPATIENT)
Dept: FAMILY MEDICINE CLINIC | Facility: CLINIC | Age: 74
End: 2019-03-18

## 2019-03-18 VITALS
BODY MASS INDEX: 27.7 KG/M2 | TEMPERATURE: 98 F | SYSTOLIC BLOOD PRESSURE: 130 MMHG | HEART RATE: 92 BPM | WEIGHT: 187 LBS | HEIGHT: 69 IN | DIASTOLIC BLOOD PRESSURE: 80 MMHG | RESPIRATION RATE: 16 BRPM

## 2019-03-18 DIAGNOSIS — M79.605 PAIN IN BOTH LOWER EXTREMITIES: ICD-10-CM

## 2019-03-18 DIAGNOSIS — N18.30 CHRONIC KIDNEY DISEASE, STAGE 3 (HCC): ICD-10-CM

## 2019-03-18 DIAGNOSIS — E78.2 MIXED HYPERLIPIDEMIA: ICD-10-CM

## 2019-03-18 DIAGNOSIS — M79.604 PAIN IN BOTH LOWER EXTREMITIES: ICD-10-CM

## 2019-03-18 DIAGNOSIS — F41.1 GENERALIZED ANXIETY DISORDER: ICD-10-CM

## 2019-03-18 DIAGNOSIS — E11.9 TYPE 2 DIABETES MELLITUS WITHOUT COMPLICATION, WITHOUT LONG-TERM CURRENT USE OF INSULIN (HCC): Primary | ICD-10-CM

## 2019-03-18 DIAGNOSIS — L82.0 SEBORRHEIC KERATOSES, INFLAMED: ICD-10-CM

## 2019-03-18 DIAGNOSIS — I10 ESSENTIAL HYPERTENSION: ICD-10-CM

## 2019-03-18 PROCEDURE — 17110 DESTRUCTION B9 LES UP TO 14: CPT | Performed by: FAMILY MEDICINE

## 2019-03-18 PROCEDURE — 99214 OFFICE O/P EST MOD 30 MIN: CPT | Performed by: FAMILY MEDICINE

## 2019-03-18 RX ORDER — IMIPRAMINE HCL 25 MG
25 TABLET ORAL NIGHTLY
Qty: 30 TABLET | Refills: 5 | Status: SHIPPED | OUTPATIENT
Start: 2019-03-18 | End: 2019-09-21 | Stop reason: SDUPTHER

## 2019-03-18 RX ORDER — ALPRAZOLAM 1 MG/1
TABLET ORAL
Qty: 90 TABLET | Refills: 3 | Status: SHIPPED | OUTPATIENT
Start: 2019-03-18 | End: 2019-09-05 | Stop reason: SDUPTHER

## 2019-03-18 RX ORDER — OMEPRAZOLE 20 MG/1
20 CAPSULE, DELAYED RELEASE ORAL DAILY
Qty: 90 CAPSULE | Refills: 3 | Status: SHIPPED | OUTPATIENT
Start: 2019-03-18 | End: 2020-03-30

## 2019-03-18 NOTE — TELEPHONE ENCOUNTER
----- Message from Ashanti Badillo sent at 3/18/2019  4:14 PM EDT -----  Contact: [facility]  QUESTION ABOUT RX FOR DIRECTION ON XANAX    [facility] [phone]

## 2019-03-19 LAB
ALBUMIN SERPL-MCNC: 4.57 G/DL (ref 3.2–4.8)
ALBUMIN/GLOB SERPL: 1.8 G/DL (ref 1.5–2.5)
ALP SERPL-CCNC: 134 U/L (ref 25–100)
ALT SERPL-CCNC: 27 U/L (ref 7–40)
AST SERPL-CCNC: 21 U/L (ref 0–33)
BILIRUB SERPL-MCNC: 0.4 MG/DL (ref 0.3–1.2)
BUN SERPL-MCNC: 21 MG/DL (ref 9–23)
BUN/CREAT SERPL: 13.9 (ref 7–25)
CALCIUM SERPL-MCNC: 9.7 MG/DL (ref 8.7–10.4)
CHLORIDE SERPL-SCNC: 105 MMOL/L (ref 99–109)
CHOLEST SERPL-MCNC: 92 MG/DL (ref 0–200)
CO2 SERPL-SCNC: 23 MMOL/L (ref 20–31)
CREAT SERPL-MCNC: 1.51 MG/DL (ref 0.6–1.3)
GLOBULIN SER CALC-MCNC: 2.5 GM/DL
GLUCOSE SERPL-MCNC: 98 MG/DL (ref 70–100)
HBA1C MFR BLD: 6.2 % (ref 4.8–5.6)
HDLC SERPL-MCNC: 31 MG/DL (ref 40–60)
LDLC SERPL CALC-MCNC: 46 MG/DL (ref 0–100)
LDLC/HDLC SERPL: 1.49 {RATIO}
POTASSIUM SERPL-SCNC: 4.5 MMOL/L (ref 3.5–5.5)
PROT SERPL-MCNC: 7.1 G/DL (ref 5.7–8.2)
SODIUM SERPL-SCNC: 141 MMOL/L (ref 132–146)
TRIGL SERPL-MCNC: 74 MG/DL (ref 0–150)
VLDLC SERPL CALC-MCNC: 14.8 MG/DL

## 2019-04-10 ENCOUNTER — OFFICE VISIT (OUTPATIENT)
Dept: FAMILY MEDICINE CLINIC | Facility: CLINIC | Age: 74
End: 2019-04-10

## 2019-04-10 ENCOUNTER — HOSPITAL ENCOUNTER (OUTPATIENT)
Dept: GENERAL RADIOLOGY | Facility: HOSPITAL | Age: 74
Discharge: HOME | End: 2019-04-10
Admitting: NURSE PRACTITIONER

## 2019-04-10 VITALS
TEMPERATURE: 98.7 F | HEIGHT: 69 IN | WEIGHT: 183 LBS | HEART RATE: 96 BPM | DIASTOLIC BLOOD PRESSURE: 78 MMHG | BODY MASS INDEX: 27.11 KG/M2 | RESPIRATION RATE: 20 BRPM | SYSTOLIC BLOOD PRESSURE: 132 MMHG

## 2019-04-10 DIAGNOSIS — M25.512 CHRONIC PAIN OF BOTH SHOULDERS: ICD-10-CM

## 2019-04-10 DIAGNOSIS — H93.8X3 EAR CONGESTION, BILATERAL: Primary | ICD-10-CM

## 2019-04-10 DIAGNOSIS — G89.29 CHRONIC PAIN OF BOTH SHOULDERS: ICD-10-CM

## 2019-04-10 DIAGNOSIS — M25.511 CHRONIC PAIN OF BOTH SHOULDERS: ICD-10-CM

## 2019-04-10 DIAGNOSIS — J30.2 SEASONAL ALLERGIES: ICD-10-CM

## 2019-04-10 DIAGNOSIS — M54.2 CHRONIC NECK PAIN: ICD-10-CM

## 2019-04-10 DIAGNOSIS — G89.29 CHRONIC NECK PAIN: ICD-10-CM

## 2019-04-10 DIAGNOSIS — N28.9 RENAL IMPAIRMENT: ICD-10-CM

## 2019-04-10 PROCEDURE — 73030 X-RAY EXAM OF SHOULDER: CPT

## 2019-04-10 PROCEDURE — 72040 X-RAY EXAM NECK SPINE 2-3 VW: CPT

## 2019-04-10 PROCEDURE — 99214 OFFICE O/P EST MOD 30 MIN: CPT | Performed by: NURSE PRACTITIONER

## 2019-04-10 RX ORDER — TRIAMCINOLONE ACETONIDE 55 UG/1
2 SPRAY, METERED NASAL DAILY
Qty: 16.5 G | Refills: 5 | Status: SHIPPED | OUTPATIENT
Start: 2019-04-10 | End: 2021-02-10

## 2019-04-10 RX ORDER — LORATADINE 10 MG/1
10 TABLET ORAL DAILY
Qty: 30 TABLET | Refills: 5 | Status: SHIPPED | OUTPATIENT
Start: 2019-04-10 | End: 2019-11-03 | Stop reason: SDUPTHER

## 2019-04-10 NOTE — PROGRESS NOTES
Subjective   Antione Douglas is a 74 y.o. male.     History of Present Illness   Bilateral ear pain and congestion and PND  + seasonal allergies  No fever or chills, no dizziness or falls  Clear nasal secretions    Bilateral shoulder pain and neck pain for years  + hx of neck surgery  Difficulty lifting arms above head; would like to have XR      The following portions of the patient's history were reviewed and updated as appropriate: allergies, current medications, past family history, past medical history, past social history, past surgical history and problem list.    Review of Systems   Constitutional: Negative for activity change, appetite change, chills, fatigue and fever.   HENT: Positive for congestion, ear pain, postnasal drip and rhinorrhea. Negative for ear discharge, sinus pressure, sneezing, sore throat, trouble swallowing and voice change.    Eyes: Negative for redness and itching.   Respiratory: Negative for cough, chest tightness, shortness of breath and wheezing.    Cardiovascular: Negative.  Negative for chest pain.   Gastrointestinal: Negative.    Endocrine: Negative.    Genitourinary: Negative.    Musculoskeletal: Positive for arthralgias (bilateral shoulder pain adn decreased ROM) and neck pain. Negative for myalgias and neck stiffness.   Skin: Negative.  Negative for rash.   Allergic/Immunologic: Negative.  Negative for environmental allergies.   Neurological: Negative for dizziness, weakness, light-headedness and headache.   Hematological: Negative for adenopathy.   Psychiatric/Behavioral: Negative.  Negative for sleep disturbance.       Objective   Physical Exam   Constitutional: He is oriented to person, place, and time. He appears well-developed and well-nourished.   HENT:   Head: Normocephalic.   Right Ear: External ear normal.   Left Ear: External ear normal.   Nose: Nose normal.   Mouth/Throat: Oropharynx is clear and moist.   Cardiovascular: Normal rate, regular rhythm and normal heart  sounds.   Pulmonary/Chest: Effort normal and breath sounds normal.   Musculoskeletal: Normal range of motion.   Neurological: He is alert and oriented to person, place, and time.   Skin: Skin is warm and dry.   Psychiatric: He has a normal mood and affect. His behavior is normal. Judgment and thought content normal.   Nursing note and vitals reviewed.        Assessment/Plan Mario Talbot was seen today for bilateral ear congestion and joint pain.    Diagnoses and all orders for this visit:    Ear congestion, bilateral    Seasonal allergies  -     loratadine (CLARITIN) 10 MG tablet; Take 1 tablet by mouth Daily.  -     Triamcinolone Acetonide (NASACORT) 55 MCG/ACT nasal inhaler; 2 sprays into the nostril(s) as directed by provider Daily.    Chronic neck pain  -     XR Spine Cervical 2 or 3 View    Chronic pain of both shoulders  -     XR Shoulder 2+ View Left  -     Cancel: XR Shoulder 2+ View Right  -     XR Shoulder 2+ View Bilateral    Renal impairment  -     Basic metabolic panel      Will check labs and notify pt of results  Will start pt on some allergy meds  Will check XR of neck and shoulders

## 2019-04-11 LAB
BUN SERPL-MCNC: 19 MG/DL (ref 8–23)
BUN/CREAT SERPL: 13 (ref 7–25)
CALCIUM SERPL-MCNC: 9.8 MG/DL (ref 8.6–10.5)
CHLORIDE SERPL-SCNC: 103 MMOL/L (ref 98–107)
CO2 SERPL-SCNC: 26.4 MMOL/L (ref 22–29)
CREAT SERPL-MCNC: 1.46 MG/DL (ref 0.76–1.27)
GLUCOSE SERPL-MCNC: 105 MG/DL (ref 65–99)
POTASSIUM SERPL-SCNC: 5 MMOL/L (ref 3.5–5.2)
SODIUM SERPL-SCNC: 140 MMOL/L (ref 136–145)

## 2019-04-16 DIAGNOSIS — M54.2 CHRONIC NECK PAIN: Primary | ICD-10-CM

## 2019-04-16 DIAGNOSIS — G89.29 CHRONIC NECK PAIN: Primary | ICD-10-CM

## 2019-04-16 DIAGNOSIS — M25.511 CHRONIC PAIN OF BOTH SHOULDERS: Primary | ICD-10-CM

## 2019-04-16 DIAGNOSIS — G89.29 CHRONIC PAIN OF BOTH SHOULDERS: Primary | ICD-10-CM

## 2019-04-16 DIAGNOSIS — M25.512 CHRONIC PAIN OF BOTH SHOULDERS: Primary | ICD-10-CM

## 2019-04-26 DIAGNOSIS — E78.5 HYPERLIPIDEMIA, UNSPECIFIED HYPERLIPIDEMIA TYPE: ICD-10-CM

## 2019-04-26 RX ORDER — ATORVASTATIN CALCIUM 10 MG/1
TABLET, FILM COATED ORAL
Qty: 90 TABLET | Refills: 0 | Status: SHIPPED | OUTPATIENT
Start: 2019-04-26 | End: 2019-08-22 | Stop reason: SDUPTHER

## 2019-05-08 DIAGNOSIS — G99.2 STENOSIS OF CERVICAL SPINE WITH MYELOPATHY (HCC): Primary | ICD-10-CM

## 2019-05-08 DIAGNOSIS — M48.02 STENOSIS OF CERVICAL SPINE WITH MYELOPATHY (HCC): Primary | ICD-10-CM

## 2019-05-09 ENCOUNTER — TELEPHONE (OUTPATIENT)
Dept: FAMILY MEDICINE CLINIC | Facility: CLINIC | Age: 74
End: 2019-05-09

## 2019-05-09 NOTE — TELEPHONE ENCOUNTER
Notes recorded by Ramona Younger MA on 5/9/2019 at 11:53 AM EDT  Left message for patient to call (office number given)  ------    Notes recorded by Anna Kohli APRN on 5/8/2019 at 10:54 AM EDT  MRI of cervical spine shows chronic degenerative changes, normal curve through out spine. There is a complex disc osteophyte formation with acute endplate edema at the C4/C5 level with moderate to severe canal stenosis. Subtle T2 signal changes seen in the cord at that level which is suggestive of a small amount of possible cord edema. Will refer to Dr Mcgregor Neurosurgeon for further evaluation. Left message with his wife to have him call back. [facility]

## 2019-05-09 NOTE — TELEPHONE ENCOUNTER
----- Message from Javier Terrell sent at 5/8/2019  3:53 PM EDT -----  Contact: NATASHA / PT CALL  PT CALLED REQUESTING MRI RESULTS - I DID LET PATIENT KNOW THAT NATASHA DID PUT IN A CONSULT FOR NERUOSURGERY - PLEASE CALL PATIENT      PT CALL BACK [phone]

## 2019-05-23 ENCOUNTER — OFFICE VISIT (OUTPATIENT)
Dept: NEUROSURGERY | Facility: CLINIC | Age: 74
End: 2019-05-23

## 2019-05-23 VITALS
BODY MASS INDEX: 26.81 KG/M2 | WEIGHT: 181 LBS | HEIGHT: 69 IN | TEMPERATURE: 96.9 F | DIASTOLIC BLOOD PRESSURE: 78 MMHG | SYSTOLIC BLOOD PRESSURE: 102 MMHG

## 2019-05-23 DIAGNOSIS — G56.03 BILATERAL CARPAL TUNNEL SYNDROME: ICD-10-CM

## 2019-05-23 DIAGNOSIS — M50.30 DDD (DEGENERATIVE DISC DISEASE), CERVICAL: Primary | ICD-10-CM

## 2019-05-23 DIAGNOSIS — G25.9 MOVEMENT DISORDER: ICD-10-CM

## 2019-05-23 DIAGNOSIS — Z98.1 HISTORY OF FUSION OF CERVICAL SPINE: ICD-10-CM

## 2019-05-23 PROCEDURE — 99214 OFFICE O/P EST MOD 30 MIN: CPT | Performed by: NEUROLOGICAL SURGERY

## 2019-05-23 NOTE — PROGRESS NOTES
NAME: ASHLEY MCMAHON   DOS: 2019  : 1945  PCP: Vahe Coronel MD    Chief Complaint:    Chief Complaint   Patient presents with   • Neck Pain       History of Present Illness:  74 y.o. male   I saw this 74-year-old in follow-up has a history of a left far lateral discectomy that I did as well as a prior history of a cervical ACDF at the probable C5-6 and C6-7 levels.  He has a history of peripheral neuropathy and generalized mobility issues he is a past smoker and was a heavy drinker up until December.  He presents with a history of a fall he reports bilateral thumb index and middle finger numbness is intermittent he denies any classic radiculopathy but does have more pain on the left-hand side the symptoms have been present for quite a bit of time but he does have worsening of his mobility and fall risk he is here for evaluation    PMHX  Allergies:  Allergies   Allergen Reactions   • Amitriptyline Other (See Comments)     Caused vision and hearing problems and constipation   • Lyrica [Pregabalin] Confusion     Medications    Current Outpatient Medications:   •  albuterol (PROVENTIL HFA;VENTOLIN HFA) 108 (90 BASE) MCG/ACT inhaler, ProAir  (90 Base) MCG/ACT Inhalation Aerosol Solution; Patient Sig: ProAir  (90 Base) MCG/ACT Inhalation Aerosol Solution ; 0; -Aug-2013; Active, Disp: , Rfl:   •  ALPRAZolam (XANAX) 1 MG tablet, for anxiety, Disp: 90 tablet, Rfl: 3  •  atorvastatin (LIPITOR) 10 MG tablet, TAKE ONE TABLET BY MOUTH DAILY, Disp: 90 tablet, Rfl: 0  •  budesonide-formoterol (SYMBICORT) 160-4.5 MCG/ACT inhaler, Inhale 2 puffs 2 (two) times a day., Disp: , Rfl:   •  DULoxetine (CYMBALTA) 30 MG capsule, , Disp: , Rfl:   •  DULoxetine (CYMBALTA) 60 MG capsule, , Disp: , Rfl:   •  furosemide (LASIX) 20 MG tablet, 2 po q am and one po q pm, Disp: 270 tablet, Rfl: 1  •  glucose blood (BRODERICK CONTOUR TEST) test strip, Test once daily  DX E11.8, Disp: 100 each, Rfl: 1  •  imipramine  (TOFRANIL) 25 MG tablet, Take 1 tablet by mouth Every Night., Disp: 30 tablet, Rfl: 5  •  ipratropium (ATROVENT) 0.06 % nasal spray, 2 sprays into each nostril 4 (Four) Times a Day., Disp: 1 each, Rfl: 3  •  lactulose (CEPHULAC) 10 g packet, Take 1 packet by mouth Daily As Needed (constipation)., Disp: 15 each, Rfl: 0  •  loratadine (CLARITIN) 10 MG tablet, Take 1 tablet by mouth Daily., Disp: 30 tablet, Rfl: 5  •  omeprazole (priLOSEC) 20 MG capsule, Take 1 capsule by mouth Daily., Disp: 90 capsule, Rfl: 3  •  OXcarbazepine (TRILEPTAL) 300 MG tablet, , Disp: , Rfl:   •  SITagliptin (JANUVIA) 100 MG tablet, Take 1 tablet by mouth Daily. (Patient taking differently: Take 50 mg by mouth Daily.), Disp: 28 tablet, Rfl: 0  •  Triamcinolone Acetonide (NASACORT) 55 MCG/ACT nasal inhaler, 2 sprays into the nostril(s) as directed by provider Daily., Disp: 16.5 g, Rfl: 5  Past Medical History:  Past Medical History:   Diagnosis Date   • Allergic rhinitis    • Anxiety    • Chronic neck pain    • COPD (chronic obstructive pulmonary disease) (CMS/HCC)    • Depression    • Diabetes mellitus (CMS/HCC)    • Duodenal adenoma    • Low back pain    • Sleep apnea     intolerant to CPAP     Past Surgical History:  Past Surgical History:   Procedure Laterality Date   • BACK SURGERY  07/29/2009    Left minimally invasive L4-5 far lateral lumbar discectomy Dr. Levi Mcgregor ([facility])   • EYE SURGERY     • SINUS SURGERY       Social Hx:  Social History     Tobacco Use   • Smoking status: Former Smoker   • Smokeless tobacco: Never Used   • Tobacco comment: quit 10 years ago   Substance Use Topics   • Alcohol use: Yes   • Drug use: No     Family Hx:  Family History   Problem Relation Age of Onset   • Coronary artery disease Mother    • Hypertension Mother    • Pancreatic cancer Father    • Coronary artery disease Father      Review of Systems:        Review of Systems   Constitutional: Positive for fatigue. Negative for activity  change, appetite change, chills, diaphoresis, fever and unexpected weight change.   HENT: Negative for congestion, dental problem, drooling, ear discharge, ear pain, facial swelling, hearing loss, mouth sores, nosebleeds, postnasal drip, rhinorrhea, sinus pressure, sinus pain, sneezing, sore throat, tinnitus, trouble swallowing and voice change.    Eyes: Negative for photophobia, pain, discharge, redness, itching and visual disturbance.   Respiratory: Negative for apnea, cough, choking, chest tightness, shortness of breath, wheezing and stridor.    Cardiovascular: Negative for chest pain, palpitations and leg swelling.   Gastrointestinal: Negative for abdominal distention, abdominal pain, anal bleeding, blood in stool, constipation, diarrhea, nausea, rectal pain and vomiting.   Endocrine: Positive for cold intolerance and polydipsia. Negative for heat intolerance, polyphagia and polyuria.   Genitourinary: Negative for decreased urine volume, difficulty urinating, discharge, dysuria, enuresis, flank pain, frequency, genital sores, hematuria, penile pain, penile swelling, scrotal swelling, testicular pain and urgency.   Musculoskeletal: Positive for arthralgias, back pain, neck pain and neck stiffness. Negative for gait problem, joint swelling and myalgias.   Skin: Negative for color change, pallor, rash and wound.   Allergic/Immunologic: Negative for environmental allergies, food allergies and immunocompromised state.   Neurological: Positive for dizziness, light-headedness (juhi, hands) and numbness. Negative for tremors, seizures, syncope, facial asymmetry, speech difficulty, weakness and headaches.   Hematological: Negative for adenopathy. Does not bruise/bleed easily.   Psychiatric/Behavioral: Negative for agitation, behavioral problems, confusion, decreased concentration, dysphoric mood, hallucinations, self-injury, sleep disturbance and suicidal ideas. The patient is nervous/anxious. The patient is not  hyperactive.         I have reviewed this note template and all pertinent parts of the review of systems social, family history, surgical history and medication list  Physical Examination:  Vitals:    05/23/19 1316   BP: 102/78   Temp: 96.9 °F (36.1 °C)      General Appearance:   Well developed, well nourished, well groomed, alert, and cooperative.  Neurological examination:  Neurologic Exam    Vital signs were reviewed and documented in the chart  Patient appeared in good neurologic function with normal comprehension fluent speech  Mood and affect are normal  Sense of smell deferred    Pupils symmetric equally reactive funduscopic exam not visualized   Visual fields intact to confrontation  Extraocular movements intact  Face motor function is symmetric  Facial sensations normal  Hearing intact to finger rub hearing intact to finger rub  Tongue is midline  Palate symmetric  Swallowing normal  Shoulder shrug normal    Muscle bulk and tone normal  5 out of 5 strength no motor drift he does have perhaps have some left-sided tricep and intrinsic weakness on the left  Gait normal intact  He has a very positive Romberg's  No clonus long tract signs or myelopathy    Reflexes symmetric trace throughout  No edema noted and extremities skin appears normal  Decreased vibratory sensation bilaterally pulses are good in the arms bilateral  Straight leg raise sign absent  No signs of intrinsic hip dysfunction  Back is without any lesions or abnormality  Feet are warm and well perfused      Review of Imaging/DATA:  I reviewed an MRI of his cervical spine there is quite a bit of artifact at the 4 5 level he is got central compression that I would classify as mild he is got facet arthropathy additionally down at the inferior margin he does have some lateral recess disease at perhaps 6 7 or 7 1 it is difficult to ascertain  Diagnoses/Plan:    Mr. Douglas is a 74 y.o. male   He appears with worsening of his gait instability issues his  back is stable he has no evidence of florid myelopathy but it is overshadowed by the presence of severe peripheral neuropathy.  He has not seen a neurologist in 2 years he is recently given up alcohol use which is a positive he also has prediabetes.  His neck certainly does not explain the degree of instability that he has I believe it will be multifactorial I will get an EMG of his upper extremities as well as a cervical myelogram to evaluate for dynamic compression issues after he sees neurology for evaluation of his neuropathy.

## 2019-05-29 ENCOUNTER — HOSPITAL ENCOUNTER (OUTPATIENT)
Dept: CT IMAGING | Facility: HOSPITAL | Age: 74
Discharge: HOME | End: 2019-05-29
Admitting: NEUROLOGICAL SURGERY

## 2019-05-29 ENCOUNTER — HOSPITAL ENCOUNTER (OUTPATIENT)
Dept: GENERAL RADIOLOGY | Facility: HOSPITAL | Age: 74
Discharge: HOME | End: 2019-05-29

## 2019-05-29 DIAGNOSIS — I10 ESSENTIAL HYPERTENSION: ICD-10-CM

## 2019-05-29 DIAGNOSIS — Z98.1 HISTORY OF FUSION OF CERVICAL SPINE: ICD-10-CM

## 2019-05-29 PROCEDURE — 72125 CT NECK SPINE W/O DYE: CPT

## 2019-05-29 PROCEDURE — 72040 X-RAY EXAM NECK SPINE 2-3 VW: CPT

## 2019-05-29 RX ORDER — FUROSEMIDE 20 MG/1
TABLET ORAL
Qty: 270 TABLET | Refills: 0 | Status: SHIPPED | OUTPATIENT
Start: 2019-05-29 | End: 2019-06-20

## 2019-06-04 ENCOUNTER — OFFICE VISIT (OUTPATIENT)
Dept: FAMILY MEDICINE CLINIC | Facility: CLINIC | Age: 74
End: 2019-06-04

## 2019-06-04 VITALS
TEMPERATURE: 97 F | WEIGHT: 181.5 LBS | SYSTOLIC BLOOD PRESSURE: 96 MMHG | HEIGHT: 69 IN | DIASTOLIC BLOOD PRESSURE: 64 MMHG | BODY MASS INDEX: 26.88 KG/M2 | HEART RATE: 80 BPM | RESPIRATION RATE: 18 BRPM

## 2019-06-04 DIAGNOSIS — M25.511 CHRONIC PAIN OF BOTH SHOULDERS: Primary | ICD-10-CM

## 2019-06-04 DIAGNOSIS — E11.9 TYPE 2 DIABETES MELLITUS WITHOUT COMPLICATION, WITHOUT LONG-TERM CURRENT USE OF INSULIN (HCC): ICD-10-CM

## 2019-06-04 DIAGNOSIS — G89.29 CHRONIC NECK PAIN: ICD-10-CM

## 2019-06-04 DIAGNOSIS — M54.2 CHRONIC NECK PAIN: ICD-10-CM

## 2019-06-04 DIAGNOSIS — G89.29 CHRONIC PAIN OF BOTH SHOULDERS: Primary | ICD-10-CM

## 2019-06-04 DIAGNOSIS — M25.512 CHRONIC PAIN OF BOTH SHOULDERS: Primary | ICD-10-CM

## 2019-06-04 DIAGNOSIS — M54.5 CHRONIC MIDLINE LOW BACK PAIN, WITH SCIATICA PRESENCE UNSPECIFIED: ICD-10-CM

## 2019-06-04 DIAGNOSIS — M51.36 DDD (DEGENERATIVE DISC DISEASE), LUMBAR: ICD-10-CM

## 2019-06-04 DIAGNOSIS — N18.30 CHRONIC KIDNEY DISEASE, STAGE 3 (HCC): ICD-10-CM

## 2019-06-04 DIAGNOSIS — G89.29 CHRONIC MIDLINE LOW BACK PAIN, WITH SCIATICA PRESENCE UNSPECIFIED: ICD-10-CM

## 2019-06-04 LAB
EXPIRATION DATE: NORMAL
GLUCOSE BLDC GLUCOMTR-MCNC: 115 MG/DL (ref 70–130)
Lab: NORMAL

## 2019-06-04 PROCEDURE — 99213 OFFICE O/P EST LOW 20 MIN: CPT | Performed by: NURSE PRACTITIONER

## 2019-06-04 PROCEDURE — 82962 GLUCOSE BLOOD TEST: CPT | Performed by: NURSE PRACTITIONER

## 2019-06-04 RX ORDER — HYDROCODONE BITARTRATE AND ACETAMINOPHEN 5; 325 MG/1; MG/1
1 TABLET ORAL EVERY 6 HOURS PRN
Qty: 12 TABLET | Refills: 0 | Status: SHIPPED | OUTPATIENT
Start: 2019-06-04 | End: 2019-06-20

## 2019-06-04 NOTE — PROGRESS NOTES
Subjective   Antione Douglas is a 74 y.o. male.     History of Present Illness   Bilateral shoulder pain worse in the evening  Using icy Hot and taking some Hydrocodone that he had left over from pain management (over a year ago)  He went to see Dr Mcgregor neurosurgeon after having MRI of neck and back, he told him nothing he can do for him, does not really want to have surgery on his neck  Supposed to have nerve test by Dr Gómez Neurology in July 1  Wants to get some help with chronic pain, is willing to try pain management does not want to leave [facility] if possible  Rates pain 6/10 aching that is constant but worse at night    The following portions of the patient's history were reviewed and updated as appropriate: allergies, current medications, past family history, past medical history, past social history, past surgical history and problem list.    Review of Systems   Constitutional: Positive for activity change.   Musculoskeletal: Positive for arthralgias, back pain, myalgias, neck pain and neck stiffness.   Skin: Negative.    Psychiatric/Behavioral: Positive for sleep disturbance.       Objective   Physical Exam   Constitutional: He is oriented to person, place, and time. He appears well-developed and well-nourished. He appears distressed (mild distress due to pain).   HENT:   Head: Normocephalic.   Nose: Nose normal.   Cardiovascular: Normal rate, regular rhythm and normal heart sounds.   Pulmonary/Chest: Effort normal and breath sounds normal.   Musculoskeletal: Normal range of motion. He exhibits no deformity.        Right shoulder: He exhibits tenderness (at AC joint). He exhibits normal range of motion, no swelling, no crepitus and normal strength.        Left shoulder: He exhibits bony tenderness. He exhibits normal range of motion, no effusion, no crepitus, no pain and normal strength.   Neurological: He is alert and oriented to person, place, and time.   Skin: He is not diaphoretic.   Nursing note and  vitals reviewed.        Assessment/Plan   Antione was seen today for shoulder pain.    Diagnoses and all orders for this visit:    Chronic pain of both shoulders  -     Ambulatory Referral to Pain Management  -     HYDROcodone-acetaminophen (NORCO) 5-325 MG per tablet; Take 1 tablet by mouth Every 6 (Six) Hours As Needed for Moderate Pain .    Chronic neck pain  -     Ambulatory Referral to Pain Management  -     HYDROcodone-acetaminophen (NORCO) 5-325 MG per tablet; Take 1 tablet by mouth Every 6 (Six) Hours As Needed for Moderate Pain .    Chronic midline low back pain, with sciatica presence unspecified  -     Ambulatory Referral to Pain Management  -     HYDROcodone-acetaminophen (NORCO) 5-325 MG per tablet; Take 1 tablet by mouth Every 6 (Six) Hours As Needed for Moderate Pain .    DDD (degenerative disc disease), lumbar  -     Ambulatory Referral to Pain Management  -     HYDROcodone-acetaminophen (NORCO) 5-325 MG per tablet; Take 1 tablet by mouth Every 6 (Six) Hours As Needed for Moderate Pain .    Chronic kidney disease, stage 3 (CMS/[facility])    Type 2 diabetes mellitus without complication, without long-term current use of insulin (CMS/[facility])  -     POCT Glucose      Will refer pt to pain management for evaluation of chronic shoulder, back and neck pain  Will give a few Hydrocodone for intense pain, use sparingly  Pt agrees

## 2019-06-13 NOTE — PROGRESS NOTES
"Subjective   Antione Douglas is a 74 y.o. male.   Chief Complaint   Patient presents with   • Follow-up     NOTE: pt is fasting for labs / pt not sure of Cymbalta dose   • Diabetes   • Hypertension     pt needs refill on BP medication / say's, \"it's little red pill\"    • Chronic Kidney Disease   • Hyperlipidemia   • Anxiety   • chronic neck and back pain     will see Dr. Justine Gómez on 7-1-19 for neck and Dr. Gomez on 7-3-19   • needs to sched Medicare Wellness     History of Present Illness     Patient returns today for follow-up of chronic medical conditions as noted above.      At his last visit we saw a nice improvement in his blood sugars.  Hemoglobin A1c was not quite normal but was headache to the normal range.  His kidney function was down a little bit.  We repeated it again in mid April.  It was slightly better.  Reminded him not to take any NSAIDs and stay well-hydrated.  We will recheck today.    Lately he has been feeling still in pain with his arms, bilateral shoulders and neck.  Also has some residual low back pain.    Been bothered a lot most recently with more pain in his shoulders.  Went to see Dr. Mcgregor of neurosurgery after having an MRI of his neck and back and was told there is nothing surgical he could do for him.  He is scheduled to have nerve conduction testing by Dr. Gómez in July.  Saw Anna Kohli in our office on 6/4/2019 looking to get some help with his chronic pain.  Willing to try pain management.  6 out of 10 aching pain that is constant and worse at night.    In review of Dr. Mcgregor's note, it states that he wanted him to get an EMG with the neurologist but also cervical myelogram afterwards to evaluate for dynamic compression issues if the neuropathy does not seem to explain his instability and symptoms.    Referral by Anna Kohli was made to pain management.  He was given a small amount of hydrocodone 5/325.  He was to use this sparingly.  He was given 12 tablets.    Due for Pneumovax " today    The following portions of the patient's history were reviewed and updated as appropriate: allergies, current medications, past medical history, past social history and problem list.    Review of Systems   Constitutional: Negative for unexpected weight loss.   HENT: Negative.    Eyes: Negative for blurred vision.   Respiratory: Negative.    Cardiovascular: Negative.  Negative for chest pain and leg swelling.   Gastrointestinal: Negative.    Endocrine: Negative for polydipsia and polyuria.   Musculoskeletal: Positive for arthralgias (bilat shoulder pain), back pain and neck pain.   Skin: Negative.    Neurological: Positive for numbness (bilateral feet). Negative for dizziness.   Psychiatric/Behavioral: Negative.        Objective   Physical Exam   Constitutional: He appears well-developed and well-nourished. No distress.   Eyes: Conjunctivae are normal. No scleral icterus.   Neck: Carotid bruit is not present.   Cardiovascular: Normal rate, regular rhythm and normal heart sounds.   No murmur heard.  Pulmonary/Chest: Effort normal and breath sounds normal. He has no wheezes.   Musculoskeletal: He exhibits no edema.   He is a little unstable on his feet and uses a 4 pronged cane for assistance.    Antione had a diabetic foot exam performed today.  Lymphadenopathy:     He has no cervical adenopathy.   Neurological: He is alert. Sensory deficit: Decreased sensation to light touch in a stocking distribution, unchanged.   Skin: Skin is warm and dry.   Psychiatric: He has a normal mood and affect. Thought content normal.   Nursing note and vitals reviewed.        Assessment/Plan   Antione was seen today for follow-up, diabetes, hypertension, chronic kidney disease, hyperlipidemia, anxiety, chronic neck and back pain and needs to sched medicare wellness.    Diagnoses and all orders for this visit:    Type 2 diabetes mellitus without complication, without long-term current use of insulin (CMS/[facility])  Comments:  Rechecking labs  today.  Suspect stability  Orders:  -     Microalbumin / Creatinine Urine Ratio - Urine, Clean Catch  -     Hemoglobin A1c    Essential hypertension  Comments:  I think is bordering on too low.  He is to continue to hold his blood pressure medication and will actually back off on his morning Lasix from 40 to 20 mg    Chronic kidney disease, stage 3 (CMS/HCC)  -     Comprehensive Metabolic Panel    Mixed hyperlipidemia    Generalized anxiety disorder  Comments:  Stable-no changes    Chronic pain of both shoulders  Comments:  This given the arm and neck pain is his most problematic issue.  I gave him a small prescription of hydrocodone until he can be seen by pain management  Orders:  -     Pain Management Profile (13 Drugs) Urine - Urine, Clean Catch  -     HYDROcodone-acetaminophen (NORCO) 5-325 MG per tablet; Take 1 tablet by mouth Every 6 (Six) Hours As Needed for Moderate Pain .    Chronic midline low back pain, with sciatica presence unspecified  -     HYDROcodone-acetaminophen (NORCO) 5-325 MG per tablet; Take 1 tablet by mouth Every 6 (Six) Hours As Needed for Moderate Pain .    Encounter for immunization  -     Pneumococcal Polysaccharide Vaccine 23-Valent Greater Than or Equal To 3yo Subcutaneous / IM    Chronic neck pain  -     HYDROcodone-acetaminophen (NORCO) 5-325 MG per tablet; Take 1 tablet by mouth Every 6 (Six) Hours As Needed for Moderate Pain .    DDD (degenerative disc disease), lumbar  -     HYDROcodone-acetaminophen (NORCO) 5-325 MG per tablet; Take 1 tablet by mouth Every 6 (Six) Hours As Needed for Moderate Pain .    Diabetic peripheral neuropathy (CMS/HCC)    Peripheral vascular disease of foot (CMS/HCC)    Fall Risk Assessment was completed, and patient is at moderate risk for falls. We talked about standing up slowly and making adjustments to his blood pressure medication.    I did talk to him briefly that narcotic medications may not be the best approach long-term for his issues.    No  overall changes in his neuropathy.    If pain management is not able to help him or his pain were to get worse, I would urge him to go through with the myelogram that was suggested by Dr. Mcgregor.    Recheck with me in 4 months         Vahe Coronel MD  06/20/2019

## 2019-06-20 ENCOUNTER — OFFICE VISIT (OUTPATIENT)
Dept: FAMILY MEDICINE CLINIC | Facility: CLINIC | Age: 74
End: 2019-06-20

## 2019-06-20 VITALS
SYSTOLIC BLOOD PRESSURE: 98 MMHG | WEIGHT: 183.2 LBS | BODY MASS INDEX: 27.13 KG/M2 | HEIGHT: 69 IN | RESPIRATION RATE: 20 BRPM | DIASTOLIC BLOOD PRESSURE: 60 MMHG | TEMPERATURE: 98.1 F | HEART RATE: 88 BPM

## 2019-06-20 DIAGNOSIS — M54.5 CHRONIC MIDLINE LOW BACK PAIN, WITH SCIATICA PRESENCE UNSPECIFIED: ICD-10-CM

## 2019-06-20 DIAGNOSIS — M25.511 CHRONIC PAIN OF BOTH SHOULDERS: ICD-10-CM

## 2019-06-20 DIAGNOSIS — N18.30 CHRONIC KIDNEY DISEASE, STAGE 3 (HCC): ICD-10-CM

## 2019-06-20 DIAGNOSIS — G89.29 CHRONIC PAIN OF BOTH SHOULDERS: ICD-10-CM

## 2019-06-20 DIAGNOSIS — G89.29 CHRONIC NECK PAIN: ICD-10-CM

## 2019-06-20 DIAGNOSIS — E11.9 TYPE 2 DIABETES MELLITUS WITHOUT COMPLICATION, WITHOUT LONG-TERM CURRENT USE OF INSULIN (HCC): Primary | ICD-10-CM

## 2019-06-20 DIAGNOSIS — G89.29 CHRONIC MIDLINE LOW BACK PAIN, WITH SCIATICA PRESENCE UNSPECIFIED: ICD-10-CM

## 2019-06-20 DIAGNOSIS — I73.9 PERIPHERAL VASCULAR DISEASE OF FOOT (HCC): ICD-10-CM

## 2019-06-20 DIAGNOSIS — M25.512 CHRONIC PAIN OF BOTH SHOULDERS: ICD-10-CM

## 2019-06-20 DIAGNOSIS — E11.42 DIABETIC PERIPHERAL NEUROPATHY (HCC): ICD-10-CM

## 2019-06-20 DIAGNOSIS — M51.36 DDD (DEGENERATIVE DISC DISEASE), LUMBAR: ICD-10-CM

## 2019-06-20 DIAGNOSIS — E78.2 MIXED HYPERLIPIDEMIA: ICD-10-CM

## 2019-06-20 DIAGNOSIS — M54.2 CHRONIC NECK PAIN: ICD-10-CM

## 2019-06-20 DIAGNOSIS — I10 ESSENTIAL HYPERTENSION: ICD-10-CM

## 2019-06-20 DIAGNOSIS — Z23 ENCOUNTER FOR IMMUNIZATION: ICD-10-CM

## 2019-06-20 DIAGNOSIS — F41.1 GENERALIZED ANXIETY DISORDER: ICD-10-CM

## 2019-06-20 PROCEDURE — 90732 PPSV23 VACC 2 YRS+ SUBQ/IM: CPT | Performed by: FAMILY MEDICINE

## 2019-06-20 PROCEDURE — G0009 ADMIN PNEUMOCOCCAL VACCINE: HCPCS | Performed by: FAMILY MEDICINE

## 2019-06-20 PROCEDURE — 99214 OFFICE O/P EST MOD 30 MIN: CPT | Performed by: FAMILY MEDICINE

## 2019-06-20 RX ORDER — HYDROCODONE BITARTRATE AND ACETAMINOPHEN 5; 325 MG/1; MG/1
1 TABLET ORAL EVERY 6 HOURS PRN
Qty: 15 TABLET | Refills: 0 | Status: SHIPPED | OUTPATIENT
Start: 2019-06-20 | End: 2019-07-11 | Stop reason: SDUPTHER

## 2019-06-20 NOTE — PATIENT INSTRUCTIONS
Check on Blood Pressure medication    Lower the furosemide (Lasix) to one in the morning and one in the afternoon.

## 2019-06-21 LAB
ALBUMIN SERPL-MCNC: 4.8 G/DL (ref 3.5–5.2)
ALBUMIN/GLOB SERPL: 1.7 G/DL
ALP SERPL-CCNC: 131 U/L (ref 39–117)
ALT SERPL-CCNC: 23 U/L (ref 1–41)
AST SERPL-CCNC: 17 U/L (ref 1–40)
BILIRUB SERPL-MCNC: 0.2 MG/DL (ref 0.2–1.2)
BUN SERPL-MCNC: 27 MG/DL (ref 8–23)
BUN/CREAT SERPL: 18.2 (ref 7–25)
CALCIUM SERPL-MCNC: 9.8 MG/DL (ref 8.6–10.5)
CHLORIDE SERPL-SCNC: 99 MMOL/L (ref 98–107)
CO2 SERPL-SCNC: 28.1 MMOL/L (ref 22–29)
CREAT SERPL-MCNC: 1.48 MG/DL (ref 0.76–1.27)
GLOBULIN SER CALC-MCNC: 2.8 GM/DL
GLUCOSE SERPL-MCNC: 111 MG/DL (ref 65–99)
HBA1C MFR BLD: 6.4 % (ref 4.8–5.6)
POTASSIUM SERPL-SCNC: 4.7 MMOL/L (ref 3.5–5.2)
PROT SERPL-MCNC: 7.6 G/DL (ref 6–8.5)
SODIUM SERPL-SCNC: 141 MMOL/L (ref 136–145)

## 2019-06-26 DIAGNOSIS — N18.30 CHRONIC KIDNEY DISEASE, STAGE 3 (HCC): Primary | ICD-10-CM

## 2019-07-11 ENCOUNTER — TELEPHONE (OUTPATIENT)
Dept: FAMILY MEDICINE CLINIC | Facility: CLINIC | Age: 74
End: 2019-07-11

## 2019-07-11 ENCOUNTER — OFFICE VISIT (OUTPATIENT)
Dept: FAMILY MEDICINE CLINIC | Facility: CLINIC | Age: 74
End: 2019-07-11

## 2019-07-11 VITALS
SYSTOLIC BLOOD PRESSURE: 100 MMHG | DIASTOLIC BLOOD PRESSURE: 60 MMHG | RESPIRATION RATE: 20 BRPM | WEIGHT: 182.5 LBS | HEIGHT: 69 IN | BODY MASS INDEX: 27.03 KG/M2 | TEMPERATURE: 98.2 F | HEART RATE: 80 BPM

## 2019-07-11 DIAGNOSIS — M54.5 CHRONIC MIDLINE LOW BACK PAIN, WITH SCIATICA PRESENCE UNSPECIFIED: ICD-10-CM

## 2019-07-11 DIAGNOSIS — G89.29 CHRONIC PAIN OF BOTH SHOULDERS: ICD-10-CM

## 2019-07-11 DIAGNOSIS — M25.511 CHRONIC PAIN OF BOTH SHOULDERS: ICD-10-CM

## 2019-07-11 DIAGNOSIS — G89.29 CHRONIC NECK PAIN: ICD-10-CM

## 2019-07-11 DIAGNOSIS — M54.2 CHRONIC NECK PAIN: ICD-10-CM

## 2019-07-11 DIAGNOSIS — M25.512 CHRONIC PAIN OF BOTH SHOULDERS: ICD-10-CM

## 2019-07-11 DIAGNOSIS — E11.9 TYPE 2 DIABETES MELLITUS WITHOUT COMPLICATION, WITHOUT LONG-TERM CURRENT USE OF INSULIN (HCC): ICD-10-CM

## 2019-07-11 DIAGNOSIS — G89.29 CHRONIC MIDLINE LOW BACK PAIN, WITH SCIATICA PRESENCE UNSPECIFIED: ICD-10-CM

## 2019-07-11 DIAGNOSIS — Z00.00 ENCOUNTER FOR MEDICARE ANNUAL WELLNESS EXAM: Primary | ICD-10-CM

## 2019-07-11 DIAGNOSIS — M51.36 DDD (DEGENERATIVE DISC DISEASE), LUMBAR: ICD-10-CM

## 2019-07-11 LAB
A/C: ABNORMAL
POC CREATININE URINE: 200
POC MICROALBUMIN URINE: 80

## 2019-07-11 PROCEDURE — G0439 PPPS, SUBSEQ VISIT: HCPCS | Performed by: NURSE PRACTITIONER

## 2019-07-11 PROCEDURE — 82044 UR ALBUMIN SEMIQUANTITATIVE: CPT | Performed by: NURSE PRACTITIONER

## 2019-07-11 RX ORDER — DULOXETIN HYDROCHLORIDE 60 MG/1
60 CAPSULE, DELAYED RELEASE ORAL DAILY
Qty: 90 CAPSULE | Refills: 1 | Status: SHIPPED | OUTPATIENT
Start: 2019-07-11 | End: 2020-02-03 | Stop reason: SDUPTHER

## 2019-07-11 RX ORDER — OXCARBAZEPINE 300 MG/1
300 TABLET, FILM COATED ORAL 2 TIMES DAILY
Qty: 180 TABLET | Refills: 1 | Status: SHIPPED | OUTPATIENT
Start: 2019-07-11 | End: 2020-01-21

## 2019-07-11 RX ORDER — HYDROCODONE BITARTRATE AND ACETAMINOPHEN 5; 325 MG/1; MG/1
1 TABLET ORAL EVERY 6 HOURS PRN
Qty: 15 TABLET | Refills: 0 | Status: SHIPPED | OUTPATIENT
Start: 2019-07-11 | End: 2019-07-30 | Stop reason: SDUPTHER

## 2019-07-11 NOTE — TELEPHONE ENCOUNTER
Pt came back by to drop off urine for his microalbumin. Please order.    He also had been seen Dr. Phuc Dunbar at [facility]. She had been Rx'ing Duloxetine & Oxcarbazepine for pt. He does not want to go back there anymore and will be out of meds in 6 days and would like to see if you would Rx for him?

## 2019-07-11 NOTE — PROGRESS NOTES
QUICK REFERENCE INFORMATION:  The ABCs of the Annual Wellness Visit    Subsequent Medicare Wellness Visit     HEALTH RISK ASSESSMENT    : 1945    Recent Hospitalizations:  No hospitalization(s) within the last year.      Current Medical Providers:  Patient Care Team:  Vahe Coronel MD as PCP - General  Vahe Coronel MD as PCP - Family Medicine  Vahe Coronel MD as Referring Physician (Family Medicine)  Anna Kohli APRN as Referring Physician (Family Medicine)  Dr Justine Gómez, Neurology  Dr Levi Mcgregor Neurosurgery  Dr Arsen Rosenberg, Hematology  Dr David Eason, Urology         Smoking Status:  Social History     Tobacco Use   Smoking Status Former Smoker   Smokeless Tobacco Never Used   Tobacco Comment    quit 10 years ago       Alcohol Consumption:  Social History     Substance and Sexual Activity   Alcohol Use Yes       Depression Screen:   PHQ-2/PHQ-9 Depression Screening 2019   Little interest or pleasure in doing things 3   Feeling down, depressed, or hopeless 2   Trouble falling or staying asleep, or sleeping too much 2   Feeling tired or having little energy 2   Poor appetite or overeating 0   Feeling bad about yourself - or that you are a failure or have let yourself or your family down 0   Trouble concentrating on things, such as reading the newspaper or watching television 2   Moving or speaking so slowly that other people could have noticed. Or the opposite - being so fidgety or restless that you have been moving around a lot more than usual 1   Thoughts that you would be better off dead, or of hurting yourself in some way 0   Total Score 12   If you checked off any problems, how difficult have these problems made it for you to do your work, take care of things at home, or get along with other people? Very difficult       Health Habits and Functional and Cognitive Screening:  Functional & Cognitive Status 2019   Do you have difficulty preparing food and eating? No   Do you have  difficulty bathing yourself, getting dressed or grooming yourself? No   Do you have difficulty using the toilet? No   Do you have difficulty moving around from place to place? No   Do you have trouble with steps or getting out of a bed or a chair? Yes   In the past year have you fallen or experienced a near fall? Yes   Current Diet Well Balanced Diet   Dental Exam Not up to date   Eye Exam Up to date   Exercise (times per week) 0 times per week   Current Exercise Activities Include None   Do you need help using the phone?  No   Are you deaf or do you have serious difficulty hearing?  No   Do you need help with transportation? Yes   Do you need help shopping? Yes   Do you need help preparing meals?  Yes   Do you need help with housework?  No   Do you need help with laundry? No   Do you need help taking your medications? No   Do you need help managing money? No   Do you ever drive or ride in a car without wearing a seat belt? No   Have you felt unusual stress, anger or loneliness in the last month? No   Who do you live with? Spouse   If you need help, do you have trouble finding someone available to you? No   Have you been bothered in the last four weeks by sexual problems? No   Do you have difficulty concentrating, remembering or making decisions? Yes   Does the patient have evidence of cognitive impairment? No    Asiprin use counseling: Does not need ASA (and currently is not on it)      Recent Lab Results:    Lab Results   Component Value Date     (H) 06/20/2019     Lab Results   Component Value Date    HGBA1C 6.40 (H) 06/20/2019     Lab Results   Component Value Date    TRIG 74 03/18/2019    HDL 31 (L) 03/18/2019    VLDL 14.8 03/18/2019    LDLHDL 1.49 03/18/2019           Age-appropriate Screening Schedule:  Refer to the list below for future screening recommendations based on patient's age, sex and/or medical conditions. Orders for these recommended tests are listed in the plan section. The patient has  been provided with a written plan.    Health Maintenance   Topic Date Due   • TDAP/TD VACCINES (1 - Tdap) 03/06/1965   • ZOSTER VACCINE (1 of 2) 03/06/1996   • URINE MICROALBUMIN  04/26/2019   • INFLUENZA VACCINE  08/01/2019   • DIABETIC EYE EXAM  10/15/2019   • HEMOGLOBIN A1C  12/20/2019   • LIPID PANEL  03/18/2020   • DIABETIC FOOT EXAM  06/20/2020   • COLONOSCOPY  05/01/2021   • PNEUMOCOCCAL VACCINES (65+ LOW/MEDIUM RISK)  Completed        Subjective   History of Present Illness    Antione Douglas is a 74 y.o. male who presents for an Annual Wellness Visit.    The following portions of the patient's history were reviewed and updated as appropriate: allergies, current medications, past family history, past medical history, past social history, past surgical history and problem list. Chronic neck radiating down left upper extremity and back pain causing him problems, has been to pain management but records from his previous pain management provider have not been received yet. Pt was referred to Dr Gómez neurology for EMG by Dr Mcgregor recently    Outpatient Medications Prior to Visit   Medication Sig Dispense Refill   • albuterol (PROVENTIL HFA;VENTOLIN HFA) 108 (90 BASE) MCG/ACT inhaler ProAir  (90 Base) MCG/ACT Inhalation Aerosol Solution; Patient Sig: ProAir  (90 Base) MCG/ACT Inhalation Aerosol Solution ; 0; 08-Aug-2013; Active     • ALPRAZolam (XANAX) 1 MG tablet for anxiety 90 tablet 3   • atorvastatin (LIPITOR) 10 MG tablet TAKE ONE TABLET BY MOUTH DAILY 90 tablet 0   • budesonide-formoterol (SYMBICORT) 160-4.5 MCG/ACT inhaler Inhale 2 puffs 2 (two) times a day.     • DULoxetine (CYMBALTA) 60 MG capsule      • furosemide (LASIX) 20 MG tablet 2 po q am and one po q pm 270 tablet 1   • glucose blood (BRODERICK CONTOUR TEST) test strip Test once daily  DX E11.8 100 each 1   • HYDROcodone-acetaminophen (NORCO) 5-325 MG per tablet Take 1 tablet by mouth Every 6 (Six) Hours As Needed for Moderate Pain . 15  tablet 0   • imipramine (TOFRANIL) 25 MG tablet Take 1 tablet by mouth Every Night. 30 tablet 5   • ipratropium (ATROVENT) 0.06 % nasal spray 2 sprays into each nostril 4 (Four) Times a Day. 1 each 3   • lactulose (CEPHULAC) 10 g packet Take 1 packet by mouth Daily As Needed (constipation). 15 each 0   • loratadine (CLARITIN) 10 MG tablet Take 1 tablet by mouth Daily. 30 tablet 5   • omeprazole (priLOSEC) 20 MG capsule Take 1 capsule by mouth Daily. 90 capsule 3   • OXcarbazepine (TRILEPTAL) 300 MG tablet      • SITagliptin (JANUVIA) 100 MG tablet Take 1 tablet by mouth Daily. (Patient taking differently: Take 50 mg by mouth Daily.) 28 tablet 0   • Triamcinolone Acetonide (NASACORT) 55 MCG/ACT nasal inhaler 2 sprays into the nostril(s) as directed by provider Daily. 16.5 g 5     No facility-administered medications prior to visit.        Patient Active Problem List   Diagnosis   • Atopic rhinitis   • Chronic obstructive pulmonary disease (CMS/HCC)   • Type 2 diabetes mellitus without complication, without long-term current use of insulin (CMS/HCC)   • Generalized anxiety disorder   • Essential hypertension   • Primary insomnia   • Arthralgia of hip   • Chronic bilateral low back pain   • Cobalamin deficiency   • Asymptomatic PVCs   • Pharyngitis   • Chronic idiopathic constipation   • Chronic kidney disease, stage 3 (CMS/HCC)   • Diabetic peripheral neuropathy (CMS/HCC)   • Benign prostatic hyperplasia with urinary hesitancy   • Leukocytosis   • Peripheral vascular disease of foot (CMS/HCC)   • Mixed hyperlipidemia       Advance Care Planning:  Patient has an advance directive - a copy has not been provided. Have asked the patient to send this to us to add to record    Identification of Risk Factors:  Risk factors include: Chronic Pain   Fall Risk.    Review of Systems   Constitutional: Positive for activity change. Negative for unexpected weight change.   HENT: Negative.  Negative for dental problem and hearing  "loss.    Eyes: Negative.  Negative for visual disturbance.   Respiratory: Negative.  Negative for chest tightness and shortness of breath.    Cardiovascular: Negative.  Negative for chest pain, palpitations and leg swelling.   Gastrointestinal: Negative.  Negative for blood in stool.   Endocrine: Negative.    Genitourinary: Negative.    Musculoskeletal: Positive for arthralgias, back pain, myalgias, neck pain and neck stiffness.   Skin: Negative.    Allergic/Immunologic: Negative.    Neurological: Positive for weakness and numbness. Negative for dizziness and light-headedness.   Hematological: Negative.  Negative for adenopathy.   Psychiatric/Behavioral: Positive for dysphoric mood and sleep disturbance. Negative for suicidal ideas.       Compared to one year ago, the patient feels his physical health is worse.  Compared to one year ago, the patient feels his mental health is the same.    Objective     Physical Exam   Constitutional: He is oriented to person, place, and time. He appears well-developed. He appears distressed.   HENT:   Head: Normocephalic and atraumatic.   Right Ear: External ear normal.   Left Ear: External ear normal.   Nose: Nose normal.   Neck: Neck supple.   Cardiovascular: Normal rate, regular rhythm and normal heart sounds.   Pulmonary/Chest: Effort normal and breath sounds normal.   Abdominal: Soft. Bowel sounds are normal.   Musculoskeletal: He exhibits no edema.        Cervical back: He exhibits decreased range of motion and tenderness.   Neurological: He is alert and oriented to person, place, and time.   Skin: Skin is warm and dry. Capillary refill takes less than 2 seconds. He is not diaphoretic.   Psychiatric: He has a normal mood and affect. His behavior is normal. Judgment and thought content normal.   Nursing note and vitals reviewed.      Vitals:    07/11/19 1157   BP: 100/60   Pulse: 80   Resp: 20   Temp: 98.2 °F (36.8 °C)   Weight: 82.8 kg (182 lb 8 oz)   Height: 175.9 cm (69.25\") "       Patient's Body mass index is 26.75 kg/m². BMI is within normal parameters. No follow-up required..      Assessment/Plan   Patient Self-Management and Personalized Health Advice  The patient has been provided with information about: not able to exercise due to chronic back and neck pain and preventive services including:   · Fall Risk assessment done.    Visit Diagnoses:    ICD-10-CM ICD-9-CM   1. Encounter for Medicare annual wellness exam Z00.00 V70.0   2. Chronic pain of both shoulders M25.511 719.41    G89.29 338.29    M25.512    3. Chronic neck pain M54.2 723.1    G89.29 338.29   4. Chronic midline low back pain, with sciatica presence unspecified M54.5 724.2    G89.29 338.29   5. DDD (degenerative disc disease), lumbar M51.36 722.52       No orders of the defined types were placed in this encounter.      Outpatient Encounter Medications as of 7/11/2019   Medication Sig Dispense Refill   • albuterol (PROVENTIL HFA;VENTOLIN HFA) 108 (90 BASE) MCG/ACT inhaler ProAir  (90 Base) MCG/ACT Inhalation Aerosol Solution; Patient Sig: ProAir  (90 Base) MCG/ACT Inhalation Aerosol Solution ; 0; 08-Aug-2013; Active     • ALPRAZolam (XANAX) 1 MG tablet for anxiety 90 tablet 3   • atorvastatin (LIPITOR) 10 MG tablet TAKE ONE TABLET BY MOUTH DAILY 90 tablet 0   • budesonide-formoterol (SYMBICORT) 160-4.5 MCG/ACT inhaler Inhale 2 puffs 2 (two) times a day.     • DULoxetine (CYMBALTA) 60 MG capsule      • furosemide (LASIX) 20 MG tablet 2 po q am and one po q pm 270 tablet 1   • glucose blood (BRODERICK CONTOUR TEST) test strip Test once daily  DX E11.8 100 each 1   • HYDROcodone-acetaminophen (NORCO) 5-325 MG per tablet Take 1 tablet by mouth Every 6 (Six) Hours As Needed for Moderate Pain . 15 tablet 0   • imipramine (TOFRANIL) 25 MG tablet Take 1 tablet by mouth Every Night. 30 tablet 5   • ipratropium (ATROVENT) 0.06 % nasal spray 2 sprays into each nostril 4 (Four) Times a Day. 1 each 3   • lactulose  (CEPHULAC) 10 g packet Take 1 packet by mouth Daily As Needed (constipation). 15 each 0   • loratadine (CLARITIN) 10 MG tablet Take 1 tablet by mouth Daily. 30 tablet 5   • omeprazole (priLOSEC) 20 MG capsule Take 1 capsule by mouth Daily. 90 capsule 3   • OXcarbazepine (TRILEPTAL) 300 MG tablet      • SITagliptin (JANUVIA) 100 MG tablet Take 1 tablet by mouth Daily. (Patient taking differently: Take 50 mg by mouth Daily.) 28 tablet 0   • Triamcinolone Acetonide (NASACORT) 55 MCG/ACT nasal inhaler 2 sprays into the nostril(s) as directed by provider Daily. 16.5 g 5     No facility-administered encounter medications on file as of 7/11/2019.        Reviewed use of high risk medication in the elderly: yes  Reviewed for potential of harmful drug interactions in the elderly: not applicable    Follow Up:  No Follow-up on file.     An After Visit Summary and PPPS with all of these plans were given to the patient.

## 2019-07-11 NOTE — PATIENT INSTRUCTIONS
Medicare Wellness  Personal Prevention Plan of Service     Date of Office Visit:  2019  Encounter Provider:  BRANDO Kaye  Place of Service:  [facility]  Patient Name: Antione Douglas  :  1945    As part of the Medicare Wellness portion of your visit today, we are providing you with this personalized preventive plan of services (PPPS). This plan is based upon recommendations of the [place] Preventive Services Task Force (USPSTF) and the Advisory Committee on Immunization Practices (ACIP).    This lists the preventive care services that should be considered, and provides dates of when you are due. Items listed as completed are up-to-date and do not require any further intervention.    Health Maintenance   Topic Date Due   • TDAP/TD VACCINES (1 - Tdap) 1965   • ZOSTER VACCINE (1 of 2) 1996   • URINE MICROALBUMIN  2019   • INFLUENZA VACCINE  2019   • DIABETIC EYE EXAM  10/15/2019   • HEMOGLOBIN A1C  2019   • LIPID PANEL  2020   • DIABETIC FOOT EXAM  2020   • MEDICARE ANNUAL WELLNESS  2020   • COLONOSCOPY  2021   • HEPATITIS C SCREENING  Completed   • PNEUMOCOCCAL VACCINES (65+ LOW/MEDIUM RISK)  Completed   • AAA SCREEN (ONE-TIME)  Completed       No orders of the defined types were placed in this encounter.      Return in about 1 year (around 2020) for Medicare Wellness.

## 2019-07-11 NOTE — TELEPHONE ENCOUNTER
Will send in medications Oxcarbazepine and Duloxetine to pharmacy. Noted as to what neurologist has said. kel

## 2019-07-11 NOTE — TELEPHONE ENCOUNTER
----- Message from Javier Terrell sent at 7/11/2019  2:46 PM EDT -----  Contact: TAYLOR / [facility]  -  DR CHIP MCNEIL  WITH DR JOSSELINE MCKENNA CALLED AND SAID THERE IS NOTHING ELSE THEY CAN DO FOR THE PATIENT REGARDING HIS NAROPOTHY AND STATED HE MAY NEED PT - DR RDZ IS SEEING HIM FOR HIS NECK AND WILL BE DOING INJECTIONS

## 2019-07-17 ENCOUNTER — TELEPHONE (OUTPATIENT)
Dept: FAMILY MEDICINE CLINIC | Facility: CLINIC | Age: 74
End: 2019-07-17

## 2019-07-17 NOTE — TELEPHONE ENCOUNTER
----- Message from Ruthy Nielson sent at 7/17/2019  3:33 PM EDT -----  Contact: NATASHA Mcbride PT CALL BACKJ   PT WOULD LIKE A CALL BACK ABOUT HIS MEDICATION       CALL BACK   [phone]

## 2019-07-17 NOTE — TELEPHONE ENCOUNTER
Patient called stated with everything going on with his kidney should he still be taking his water pill or should he stop it

## 2019-07-19 NOTE — TELEPHONE ENCOUNTER
Left a message with the pt that he Needs to discuss this with Dr Coronel who has been managing his fluid medication and watching renal fx. Looks like Dr Coronel ordered a urine test and blood test to monitor fx. Call Dr Coronel office on Monday. [facility]

## 2019-07-22 NOTE — TELEPHONE ENCOUNTER
Needs follow up BMP and urine micro albumin as already ordered.  Does not need an appointment.  My concern was that his kidney function was down because his blood pressure has been too low.  We backed off on his blood pressure medication that I hope to see an improvement.  Elevated know that is to do the lab test as ordered.

## 2019-07-22 NOTE — TELEPHONE ENCOUNTER
Pt currently on Lasix 2 daily instead of 3 daily.     Per Dr. Coronel, stay on that dose and go ahead and come by this week for the repeat labs.     Informed pt.

## 2019-07-24 ENCOUNTER — TELEPHONE (OUTPATIENT)
Dept: FAMILY MEDICINE CLINIC | Facility: CLINIC | Age: 74
End: 2019-07-24

## 2019-07-24 DIAGNOSIS — R10.9 FLANK PAIN: Primary | ICD-10-CM

## 2019-07-24 DIAGNOSIS — N18.30 CHRONIC KIDNEY DISEASE, STAGE 3 (HCC): ICD-10-CM

## 2019-07-24 PROCEDURE — 81003 URINALYSIS AUTO W/O SCOPE: CPT | Performed by: FAMILY MEDICINE

## 2019-07-24 NOTE — TELEPHONE ENCOUNTER
Pt came by for the repeat labs today. He wanted you to know that he is staying sore in bilateral kidney / flank areas. I informed him I would tell you but it would be a few days before you possibly received this.

## 2019-07-25 ENCOUNTER — TELEPHONE (OUTPATIENT)
Dept: NEUROSURGERY | Facility: CLINIC | Age: 74
End: 2019-07-25

## 2019-07-25 DIAGNOSIS — Z98.1 HISTORY OF FUSION OF CERVICAL SPINE: ICD-10-CM

## 2019-07-25 DIAGNOSIS — M50.30 DDD (DEGENERATIVE DISC DISEASE), CERVICAL: Primary | ICD-10-CM

## 2019-07-25 LAB
BILIRUB BLD-MCNC: NEGATIVE MG/DL
BUN SERPL-MCNC: 17 MG/DL (ref 8–23)
BUN/CREAT SERPL: 11.7 (ref 7–25)
CALCIUM SERPL-MCNC: 9.7 MG/DL (ref 8.6–10.5)
CHLORIDE SERPL-SCNC: 95 MMOL/L (ref 98–107)
CLARITY, POC: CLEAR
CO2 SERPL-SCNC: 29 MMOL/L (ref 22–29)
COLOR UR: YELLOW
CREAT SERPL-MCNC: 1.45 MG/DL (ref 0.76–1.27)
GLUCOSE SERPL-MCNC: 114 MG/DL (ref 65–99)
GLUCOSE UR STRIP-MCNC: NEGATIVE MG/DL
KETONES UR QL: NEGATIVE
LEUKOCYTE EST, POC: NEGATIVE
NITRITE UR-MCNC: NEGATIVE MG/ML
PH UR: 6 [PH] (ref 5–8)
POTASSIUM SERPL-SCNC: 4.4 MMOL/L (ref 3.5–5.2)
PROT UR STRIP-MCNC: NEGATIVE MG/DL
RBC # UR STRIP: NEGATIVE /UL
SODIUM SERPL-SCNC: 137 MMOL/L (ref 136–145)
SP GR UR: 1.02 (ref 1–1.03)
UROBILINOGEN UR QL: NORMAL

## 2019-07-25 NOTE — TELEPHONE ENCOUNTER
Dr. Mcgregor states in his note that he wants a myelogram on this pt. I apologize as I was not aware of this.     I will send to [facility] to get scheduled. Pt has already had CT & EMG.    Can someone please cancel pt's upcoming appt with Dr. Mcgregor.

## 2019-07-25 NOTE — TELEPHONE ENCOUNTER
Thanks - If he wants to drop off a U/A we can look. Could simply be some soft tissue strain / inflammation. I would recommend ice / cold compress to the areas tid- qid and let us know if not better. U/A order entered

## 2019-07-26 PROBLEM — Z98.1 HISTORY OF FUSION OF CERVICAL SPINE: Status: ACTIVE | Noted: 2019-07-26

## 2019-07-26 PROBLEM — M50.30 DDD (DEGENERATIVE DISC DISEASE), CERVICAL: Status: ACTIVE | Noted: 2019-07-26

## 2019-07-30 ENCOUNTER — OFFICE VISIT (OUTPATIENT)
Dept: FAMILY MEDICINE CLINIC | Facility: CLINIC | Age: 74
End: 2019-07-30

## 2019-07-30 VITALS
TEMPERATURE: 97.3 F | SYSTOLIC BLOOD PRESSURE: 112 MMHG | HEART RATE: 68 BPM | WEIGHT: 181 LBS | RESPIRATION RATE: 16 BRPM | DIASTOLIC BLOOD PRESSURE: 80 MMHG | BODY MASS INDEX: 26.53 KG/M2

## 2019-07-30 DIAGNOSIS — G89.29 CHRONIC MIDLINE LOW BACK PAIN, WITH SCIATICA PRESENCE UNSPECIFIED: ICD-10-CM

## 2019-07-30 DIAGNOSIS — M54.5 CHRONIC MIDLINE LOW BACK PAIN, WITH SCIATICA PRESENCE UNSPECIFIED: ICD-10-CM

## 2019-07-30 DIAGNOSIS — M51.36 DDD (DEGENERATIVE DISC DISEASE), LUMBAR: ICD-10-CM

## 2019-07-30 DIAGNOSIS — R21 PERINEAL RASH IN MALE: Primary | ICD-10-CM

## 2019-07-30 DIAGNOSIS — N18.30 CHRONIC KIDNEY DISEASE, STAGE 3 (HCC): ICD-10-CM

## 2019-07-30 PROCEDURE — 99213 OFFICE O/P EST LOW 20 MIN: CPT | Performed by: NURSE PRACTITIONER

## 2019-07-30 RX ORDER — HYDROCODONE BITARTRATE AND ACETAMINOPHEN 5; 325 MG/1; MG/1
1 TABLET ORAL EVERY 6 HOURS PRN
Qty: 15 TABLET | Refills: 0 | Status: SHIPPED | OUTPATIENT
Start: 2019-07-30 | End: 2019-09-10

## 2019-07-30 NOTE — PROGRESS NOTES
Subjective   Antione Douglas is a 74 y.o. male.     History of Present Illness   CKD stage 3  Concerned about chronic kidney disease, he has not been taking any NSAIDs any longer due to this (Dr Coronel told him not to any more)    Painful rash on buttock for the past 3 years, hurts when he sits, has to sit to the side, has not tried any topical lidocaine but he has tried every kind of diaper rash ointments but nothing has helped. Denies itching or redness or spreading.  He has been given antifungal creams by his doctor but that has not helped at all either  He has been to dermatology Dr Coyle, did not have a biopsy     Chronic back pain, seeing Dr Argueta Neurosurgery, Having myelogram Aug 5    He has an appt with pain management, is requesting a few pain pills until he can get in.  Denies drinking any alcohol says he stopped about 9 months ago    The following portions of the patient's history were reviewed and updated as appropriate: allergies, current medications, past family history, past medical history, past social history, past surgical history and problem list.    Review of Systems   Constitutional: Negative.  Negative for activity change, unexpected weight gain and unexpected weight loss.   Respiratory: Negative.    Cardiovascular: Negative.    Gastrointestinal: Negative.    Genitourinary: Negative for difficulty urinating, dysuria and hematuria.   Musculoskeletal: Positive for arthralgias, back pain and myalgias.   Skin: Positive for rash and skin lesions.   Neurological: Negative.    Psychiatric/Behavioral: Positive for sleep disturbance. Negative for depressed mood. The patient is nervous/anxious.        Objective   Physical Exam   Constitutional: He is oriented to person, place, and time. He appears well-developed and well-nourished. No distress.   HENT:   Head: Normocephalic.   Nose: Nose normal.   Neck: Neck supple. No JVD present.   Cardiovascular: Normal rate, regular rhythm and normal heart sounds.    Pulmonary/Chest: Effort normal and breath sounds normal.   Musculoskeletal: He exhibits no edema.   Neurological: He is alert and oriented to person, place, and time.   Skin: Skin is warm and dry. Rash noted. He is not diaphoretic.   Psychiatric: He has a normal mood and affect. His behavior is normal. Judgment and thought content normal.   Nursing note and vitals reviewed.        Assessment/Plan   Antione was seen today for follow-up.    Diagnoses and all orders for this visit:    Perineal rash in male  -     Ambulatory Referral to Dermatology    Chronic midline low back pain, with sciatica presence unspecified  -     HYDROcodone-acetaminophen (NORCO) 5-325 MG per tablet; Take 1 tablet by mouth Every 6 (Six) Hours As Needed for Moderate Pain .    DDD (degenerative disc disease), lumbar  -     HYDROcodone-acetaminophen (NORCO) 5-325 MG per tablet; Take 1 tablet by mouth Every 6 (Six) Hours As Needed for Moderate Pain .    will refer pt to [facility] for evaluation of rash  Will give a few pain pills, but no longer filling this medication since he has been refer to pain management for treatment of his back issues.

## 2019-07-31 ENCOUNTER — TELEPHONE (OUTPATIENT)
Dept: FAMILY MEDICINE CLINIC | Facility: CLINIC | Age: 74
End: 2019-07-31

## 2019-07-31 NOTE — TELEPHONE ENCOUNTER
----- Message from Javier Terrell Rep sent at 7/31/2019 11:32 AM EDT -----  Contact: VAZQUEZ / PT CALL  PT CALLED AND NEEDED CLARIFICATION ABOUT ON  MEDS -  IF HE WAS TO  A CREAM FOR HIS RASH TO GET HIM BY UNTIL HE SEES DERM?      JOSE CRUZ FINNEGAN    PT CALL BACK [phone] - MELISSA - WIFE

## 2019-08-05 ENCOUNTER — APPOINTMENT (OUTPATIENT)
Dept: CT IMAGING | Facility: HOSPITAL | Age: 74
End: 2019-08-05

## 2019-08-05 ENCOUNTER — HOSPITAL ENCOUNTER (OUTPATIENT)
Facility: HOSPITAL | Age: 74
Discharge: HOME | End: 2019-08-05
Attending: RADIOLOGY | Admitting: NEUROLOGICAL SURGERY

## 2019-08-05 VITALS
WEIGHT: 181.22 LBS | BODY MASS INDEX: 25.94 KG/M2 | HEART RATE: 70 BPM | TEMPERATURE: 97.5 F | DIASTOLIC BLOOD PRESSURE: 86 MMHG | SYSTOLIC BLOOD PRESSURE: 125 MMHG | OXYGEN SATURATION: 97 % | HEIGHT: 70 IN | RESPIRATION RATE: 16 BRPM

## 2019-08-05 DIAGNOSIS — M50.30 DDD (DEGENERATIVE DISC DISEASE), CERVICAL: ICD-10-CM

## 2019-08-05 DIAGNOSIS — Z98.1 HISTORY OF FUSION OF CERVICAL SPINE: ICD-10-CM

## 2019-08-05 LAB — GLUCOSE BLDC GLUCOMTR-MCNC: 122 MG/DL (ref 70–130)

## 2019-08-05 PROCEDURE — S0260 H&P FOR SURGERY: HCPCS | Performed by: RADIOLOGY

## 2019-08-05 PROCEDURE — 25010000002 IOPAMIDOL 61 % SOLUTION: Performed by: RADIOLOGY

## 2019-08-05 PROCEDURE — 61055 INJECTION INTO BRAIN CANAL: CPT | Performed by: RADIOLOGY

## 2019-08-05 PROCEDURE — 72240 MYELOGRAPHY NECK SPINE: CPT | Performed by: RADIOLOGY

## 2019-08-05 PROCEDURE — 72126 CT NECK SPINE W/DYE: CPT

## 2019-08-05 PROCEDURE — 82962 GLUCOSE BLOOD TEST: CPT

## 2019-08-05 RX ORDER — LIDOCAINE HYDROCHLORIDE 10 MG/ML
INJECTION, SOLUTION EPIDURAL; INFILTRATION; INTRACAUDAL; PERINEURAL AS NEEDED
Status: DISCONTINUED | OUTPATIENT
Start: 2019-08-05 | End: 2019-08-05 | Stop reason: HOSPADM

## 2019-08-05 NOTE — H&P
NAME: ASHLEY MCMAHON  : 1945  PCP: Vahe Coronel MD  Attending MD: Vern Cunningham MD    Date of Admission:  2019  Date of Service: 2019    History of Present Illness:  74 y.o.  male with history of prior ACDF at the C5/6 and C6/7 levels.  Please have progressive pain and numbness in the bilateral upper extremities (left greater than right).    Past Medical History:  Past Medical History:   Diagnosis Date   • Allergic rhinitis    • Anxiety    • Chronic neck pain    • COPD (chronic obstructive pulmonary disease) (CMS/[facility])    • Depression    • Diabetes mellitus (CMS/[facility])    • Duodenal adenoma    • Low back pain    • Sleep apnea     intolerant to CPAP       Past Surgical History:  Past Surgical History:   Procedure Laterality Date   • BACK SURGERY  2009    Left minimally invasive L4-5 far lateral lumbar discectomy Dr. Levi Mcgregor ([facility])   • CERVICAL FUSION      C5-6, C6-7 ACDF (unknown date, unknown surgeon)   • EYE SURGERY     • SINUS SURGERY           Medications  Medications Prior to Admission   Medication Sig Dispense Refill Last Dose   • albuterol (PROVENTIL HFA;VENTOLIN HFA) 108 (90 BASE) MCG/ACT inhaler Inhale 2 puffs Every 6 (Six) Hours As Needed. ProAir  (90 Base) MCG/ACT Inhalation Aerosol Solution; Patient Sig: ProAir  (90 Base) MCG/ACT Inhalation Aerosol Solution ; 0; -Aug-2013; Active   Past Week at Unknown time   • ALPRAZolam (XANAX) 1 MG tablet for anxiety (Patient taking differently: Take 1 mg by mouth 2 (Two) Times a Day As Needed. for anxiety) 90 tablet 3 2019 at 0430   • atorvastatin (LIPITOR) 10 MG tablet TAKE ONE TABLET BY MOUTH DAILY 90 tablet 0 2019 at Unknown time   • budesonide-formoterol (SYMBICORT) 160-4.5 MCG/ACT inhaler Inhale 2 puffs 2 (two) times a day.   2019 at Unknown time   • DULoxetine (CYMBALTA) 60 MG capsule Take 1 capsule by mouth Daily. 90 capsule 1 2019 at Unknown time   • furosemide (LASIX) 20 MG tablet 2 po q  am and one po q pm (Patient taking differently: Take 40 mg by mouth Daily With Lunch.) 270 tablet 1 8/4/2019 at Unknown time   • HYDROcodone-acetaminophen (NORCO) 5-325 MG per tablet Take 1 tablet by mouth Every 6 (Six) Hours As Needed for Moderate Pain . 15 tablet 0 Past Week at Unknown time   • imipramine (TOFRANIL) 25 MG tablet Take 1 tablet by mouth Every Night. 30 tablet 5 8/4/2019 at Unknown time   • loratadine (CLARITIN) 10 MG tablet Take 1 tablet by mouth Daily. (Patient taking differently: Take 10 mg by mouth Daily As Needed.) 30 tablet 5 Past Month at Unknown time   • omeprazole (priLOSEC) 20 MG capsule Take 1 capsule by mouth Daily. 90 capsule 3 8/4/2019 at Unknown time   • OXcarbazepine (TRILEPTAL) 300 MG tablet Take 1 tablet by mouth 2 (Two) Times a Day. (Patient taking differently: Take 300 mg by mouth Every Night.) 180 tablet 1 8/4/2019 at Unknown time   • SITagliptin (JANUVIA) 100 MG tablet Take 1 tablet by mouth Daily. 28 tablet 0 8/4/2019 at Unknown time   • glucose blood (BRODERICK CONTOUR TEST) test strip Test once daily  DX E11.8 100 each 1 Taking   • ipratropium (ATROVENT) 0.06 % nasal spray 2 sprays into each nostril 4 (Four) Times a Day. (Patient taking differently: 2 sprays into the nostril(s) as directed by provider 4 (Four) Times a Day As Needed.) 1 each 3 More than a month at Unknown time   • lactulose (CEPHULAC) 10 g packet Take 1 packet by mouth Daily As Needed (constipation). 15 each 0 More than a month at Unknown time   • Triamcinolone Acetonide (NASACORT) 55 MCG/ACT nasal inhaler 2 sprays into the nostril(s) as directed by provider Daily. 16.5 g 5 More than a month at Unknown time       Allergies:  Allergies   Allergen Reactions   • Amitriptyline Other (See Comments)     Caused vision and hearing problems and constipation   • Lyrica [Pregabalin] Confusion       Social Hx:  Social History     Socioeconomic History   • Marital status:      Spouse name: Not on file   • Number of  children: Not on file   • Years of education: Not on file   • Highest education level: Not on file   Tobacco Use   • Smoking status: Former Smoker   • Smokeless tobacco: Never Used   • Tobacco comment: quit smoking in 2007   Substance and Sexual Activity   • Alcohol use: No     Frequency: Never   • Drug use: No   • Sexual activity: Defer       Family Hx:  Family History   Problem Relation Age of Onset   • Coronary artery disease Mother    • Hypertension Mother    • Pancreatic cancer Father    • Coronary artery disease Father        Review of Imaging:      Laboratory Result:  Lab Results   Component Value Date    WBC 12.23 (H) 05/08/2018    HGB 14.8 05/08/2018    HCT 45.5 05/08/2018    MCV 98.5 05/08/2018     05/08/2018     Lab Results   Component Value Date    GLUCOSE 118 (H) 11/16/2016    CALCIUM 9.7 07/24/2019     07/24/2019    K 4.4 07/24/2019    CO2 29.0 07/24/2019    CL 95 (L) 07/24/2019    BUN 17 07/24/2019    CREATININE 1.45 (H) 07/24/2019    EGFRIFAFRI 58 (L) 07/24/2019    EGFRIFNONA 48 (L) 07/24/2019    BCR 11.7 07/24/2019    ANIONGAP 14.0 (H) 11/16/2016     Lab Results   Component Value Date    HGBA1C 6.40 (H) 06/20/2019     Lab Results   Component Value Date    CHLPL 92 03/18/2019    TRIG 74 03/18/2019    HDL 31 (L) 03/18/2019    LDL 46 03/18/2019       Physical Examination:  Vitals:    08/05/19 0721   BP: 120/98   Pulse:    Resp:    Temp:    SpO2:         General Appearance:   Well developed, well nourished, well groomed, alert, and cooperative.    Neurological examination:    Vital signs were reviewed and documented in the chart  Patient appeared in good neurologic function with normal comprehension fluent speech  Mood and affect are normal  Sense of smell deferred     Pupils symmetric equally reactive funduscopic exam not visualized   Visual fields intact to confrontation  Extraocular movements intact  Face motor function is symmetric  Facial sensations normal  Hearing intact to finger rub  hearing intact to finger rub  Tongue is midline  Palate symmetric  Swallowing normal  Shoulder shrug normal     Muscle bulk and tone normal  5 out of 5 strength no motor drift he does have perhaps have some left-sided tricep and intrinsic weakness on the left  Gait normal intact  He has a very positive Romberg's  No clonus long tract signs or myelopathy     Reflexes symmetric trace throughout  No edema noted and extremities skin appears normal  Decreased vibratory sensation bilaterally pulses are good in the arms bilateral  Straight leg raise sign absent  No signs of intrinsic hip dysfunction  Back is without any lesions or abnormality  Feet are warm and well perfused      Diagnoses/Plan:    Mr. Douglas is a 74 y.o. male presents for cervical myelography to to evaluate for possible cervical myelopathy as the etiology for his symptoms.

## 2019-08-22 ENCOUNTER — OFFICE VISIT (OUTPATIENT)
Dept: NEUROSURGERY | Facility: CLINIC | Age: 74
End: 2019-08-22

## 2019-08-22 VITALS
HEIGHT: 70 IN | BODY MASS INDEX: 25.91 KG/M2 | DIASTOLIC BLOOD PRESSURE: 70 MMHG | SYSTOLIC BLOOD PRESSURE: 118 MMHG | WEIGHT: 181 LBS | TEMPERATURE: 98.7 F

## 2019-08-22 DIAGNOSIS — G60.9 HEREDITARY AND IDIOPATHIC PERIPHERAL NEUROPATHY: ICD-10-CM

## 2019-08-22 DIAGNOSIS — M47.812 CERVICAL SPONDYLOSIS WITHOUT MYELOPATHY: ICD-10-CM

## 2019-08-22 DIAGNOSIS — E78.5 HYPERLIPIDEMIA, UNSPECIFIED HYPERLIPIDEMIA TYPE: ICD-10-CM

## 2019-08-22 DIAGNOSIS — M25.511 PAIN IN JOINT OF RIGHT SHOULDER: Primary | ICD-10-CM

## 2019-08-22 PROCEDURE — 99213 OFFICE O/P EST LOW 20 MIN: CPT | Performed by: NEUROLOGICAL SURGERY

## 2019-08-22 RX ORDER — ATORVASTATIN CALCIUM 10 MG/1
TABLET, FILM COATED ORAL
Qty: 90 TABLET | Refills: 0 | Status: SHIPPED | OUTPATIENT
Start: 2019-08-22 | End: 2019-11-17 | Stop reason: SDUPTHER

## 2019-08-22 NOTE — PROGRESS NOTES
NAME: ASHLEY MCMAHON   DOS: 2019  : 1945  PCP: Vahe Coronel MD    Chief Complaint:    Chief Complaint   Patient presents with   • Neck Pain       History of Present Illness:  74 y.o. male   I saw this 74-year-old in neurosurgical consultation he has a history of a complex cervical C5-6 and C6-7 levels we did a far lateral discectomy on him he did well.  He complains of right sided shoulder pain he has a history of past alcohol use and peripheral neuropathy confirmed on the EMG that I ordered he reports pain with range of motion passive and active in the right shoulder and to lesser degrees on the left but mostly on the right he denies any problems with his hands  PMHX  Allergies:  Allergies   Allergen Reactions   • Amitriptyline Other (See Comments)     Caused vision and hearing problems and constipation   • Lyrica [Pregabalin] Confusion     Medications    Current Outpatient Medications:   •  albuterol (PROVENTIL HFA;VENTOLIN HFA) 108 (90 BASE) MCG/ACT inhaler, Inhale 2 puffs Every 6 (Six) Hours As Needed. ProAir  (90 Base) MCG/ACT Inhalation Aerosol Solution; Patient Sig: ProAir  (90 Base) MCG/ACT Inhalation Aerosol Solution ; 0; 08-Aug-2013; Active, Disp: , Rfl:   •  ALPRAZolam (XANAX) 1 MG tablet, for anxiety (Patient taking differently: Take 1 mg by mouth 2 (Two) Times a Day As Needed. for anxiety), Disp: 90 tablet, Rfl: 3  •  atorvastatin (LIPITOR) 10 MG tablet, TAKE ONE TABLET BY MOUTH DAILY, Disp: 90 tablet, Rfl: 0  •  budesonide-formoterol (SYMBICORT) 160-4.5 MCG/ACT inhaler, Inhale 2 puffs 2 (two) times a day., Disp: , Rfl:   •  DULoxetine (CYMBALTA) 60 MG capsule, Take 1 capsule by mouth Daily., Disp: 90 capsule, Rfl: 1  •  furosemide (LASIX) 20 MG tablet, 2 po q am and one po q pm (Patient taking differently: Take 40 mg by mouth Daily With Lunch.), Disp: 270 tablet, Rfl: 1  •  glucose blood (BRODERICK CONTOUR TEST) test strip, Test once daily  DX E11.8, Disp: 100 each, Rfl: 1  •   HYDROcodone-acetaminophen (NORCO) 5-325 MG per tablet, Take 1 tablet by mouth Every 6 (Six) Hours As Needed for Moderate Pain ., Disp: 15 tablet, Rfl: 0  •  imipramine (TOFRANIL) 25 MG tablet, Take 1 tablet by mouth Every Night., Disp: 30 tablet, Rfl: 5  •  ipratropium (ATROVENT) 0.06 % nasal spray, 2 sprays into each nostril 4 (Four) Times a Day. (Patient taking differently: 2 sprays into the nostril(s) as directed by provider 4 (Four) Times a Day As Needed.), Disp: 1 each, Rfl: 3  •  lactulose (CEPHULAC) 10 g packet, Take 1 packet by mouth Daily As Needed (constipation)., Disp: 15 each, Rfl: 0  •  loratadine (CLARITIN) 10 MG tablet, Take 1 tablet by mouth Daily. (Patient taking differently: Take 10 mg by mouth Daily As Needed.), Disp: 30 tablet, Rfl: 5  •  omeprazole (priLOSEC) 20 MG capsule, Take 1 capsule by mouth Daily., Disp: 90 capsule, Rfl: 3  •  OXcarbazepine (TRILEPTAL) 300 MG tablet, Take 1 tablet by mouth 2 (Two) Times a Day. (Patient taking differently: Take 300 mg by mouth Every Night.), Disp: 180 tablet, Rfl: 1  •  SITagliptin (JANUVIA) 100 MG tablet, Take 1 tablet by mouth Daily., Disp: 28 tablet, Rfl: 0  •  Triamcinolone Acetonide (NASACORT) 55 MCG/ACT nasal inhaler, 2 sprays into the nostril(s) as directed by provider Daily., Disp: 16.5 g, Rfl: 5  Past Medical History:  Past Medical History:   Diagnosis Date   • Allergic rhinitis    • Anxiety    • Chronic neck pain    • COPD (chronic obstructive pulmonary disease) (CMS/HCC)    • Depression    • Diabetes mellitus (CMS/HCC)    • Duodenal adenoma    • Low back pain    • Sleep apnea     intolerant to CPAP     Past Surgical History:  Past Surgical History:   Procedure Laterality Date   • BACK SURGERY  07/29/2009    Left minimally invasive L4-5 far lateral lumbar discectomy Dr. Levi Mcgregor ([facility])   • CERVICAL FUSION      C5-6, C6-7 ACDF (unknown date, unknown surgeon)   • EYE SURGERY     • INTERVENTIONAL RADIOLOGY PROCEDURE N/A 8/5/2019     Procedure: IR myelogram cervical spine;  Surgeon: Vern Cunningham MD;  Location: [facility] INVASIVE LOCATION;  Service: Interventional Radiology   • SINUS SURGERY       Social Hx:  Social History     Tobacco Use   • Smoking status: Former Smoker   • Smokeless tobacco: Never Used   • Tobacco comment: quit smoking in 2007   Substance Use Topics   • Alcohol use: No     Frequency: Never   • Drug use: No     Family Hx:  Family History   Problem Relation Age of Onset   • Coronary artery disease Mother    • Hypertension Mother    • Pancreatic cancer Father    • Coronary artery disease Father      Review of Systems:        Review of Systems   Constitutional: Positive for fatigue. Negative for activity change, appetite change, chills, diaphoresis, fever and unexpected weight change.   HENT: Negative for congestion, dental problem, drooling, ear discharge, ear pain, facial swelling, hearing loss, mouth sores, nosebleeds, postnasal drip, rhinorrhea, sinus pressure, sinus pain, sneezing, sore throat, tinnitus, trouble swallowing and voice change.    Eyes: Negative for photophobia, pain, discharge, redness, itching and visual disturbance.   Respiratory: Negative for apnea, cough, choking, chest tightness, shortness of breath, wheezing and stridor.    Cardiovascular: Negative for chest pain, palpitations and leg swelling.   Gastrointestinal: Negative for abdominal distention, abdominal pain, anal bleeding, blood in stool, constipation, diarrhea, nausea, rectal pain and vomiting.   Endocrine: Positive for cold intolerance and polydipsia. Negative for heat intolerance, polyphagia and polyuria.   Genitourinary: Negative for decreased urine volume, difficulty urinating, discharge, dysuria, enuresis, flank pain, frequency, genital sores, hematuria, penile pain, penile swelling, scrotal swelling, testicular pain and urgency.   Musculoskeletal: Positive for arthralgias, back pain, neck pain and neck stiffness. Negative for gait  problem, joint swelling and myalgias.   Skin: Negative for color change, pallor, rash and wound.   Allergic/Immunologic: Negative for environmental allergies, food allergies and immunocompromised state.   Neurological: Positive for dizziness, light-headedness (juhi, hands) and numbness. Negative for tremors, seizures, syncope, facial asymmetry, speech difficulty, weakness and headaches.   Hematological: Negative for adenopathy. Does not bruise/bleed easily.   Psychiatric/Behavioral: Negative for agitation, behavioral problems, confusion, decreased concentration, dysphoric mood, hallucinations, self-injury, sleep disturbance and suicidal ideas. The patient is nervous/anxious. The patient is not hyperactive.             Physical Examination:  There were no vitals filed for this visit.   General Appearance:   Well developed, well nourished, well groomed, alert, and cooperative.  Neurological examination:  Neurologic Exam  He is awake alert and follows commands    His cranial nerves grossly intact    He has no evidence of Spurling's    He is got pain with passive range of motion in his right shoulder and active range of motion with abduction of 90 degrees he has no evidence of weakness    He has no signs of intrinsic atrophy of his hands no clonus long track signs are Bennett's    He is got decreased vibratory sensation    Review of Imaging/DATA:  I reviewed his cervical radicular and myelogram.  His EMG shows no evidence of cervical radiculopathy it shows a moderate motor peripheral neuropathy consistent with his chronic alcoholic use in the past his ins his cervical myelogram was reviewed that demonstrates the presence of multilevel degenerative disc he is got adjacent level disease at C4-5 that is worse on the left side contradistinction to the most of his symptom  Diagnoses/Plan:    Mr. Douglas is a 74 y.o. male   He has advanced cervical degenerative changes at C4-5 he has no evidence of compressive myelopathy he is  got limited range of motion in his right shoulder and nobody has evaluated this I will make a referral to a shoulder specialist or orthopedic surgeon he is got limited passive and active range of motion he does have bilateral higher grade neural foraminal disease at the C4-5 level but in the absence of radiculopathy on his EMG study I would defer surgery I explained the risk benefits and expected outcome of a surgical intervention if he hits brick wall we can contemplate it but I just worry that it is not going to assist him in his quality of life.  I also think that most of his numbness walking issues etc. due to his motor neuropathy a referral to a neurologist can be in order if needed but I explained that there is unlikely any therapy other than this than abstinence from alcohol.    It has been a pleasure to provide neurosurgical care I be happy to see him back anytime in the future

## 2019-09-05 DIAGNOSIS — F41.1 GENERALIZED ANXIETY DISORDER: ICD-10-CM

## 2019-09-05 RX ORDER — ALPRAZOLAM 1 MG/1
TABLET ORAL
Qty: 90 TABLET | Refills: 2 | Status: SHIPPED | OUTPATIENT
Start: 2019-09-05 | End: 2019-12-18

## 2019-09-09 ENCOUNTER — HOSPITAL ENCOUNTER (OUTPATIENT)
Dept: GENERAL RADIOLOGY | Facility: HOSPITAL | Age: 74
Discharge: HOME | End: 2019-09-09
Admitting: NEUROLOGICAL SURGERY

## 2019-09-09 DIAGNOSIS — G60.9 HEREDITARY AND IDIOPATHIC PERIPHERAL NEUROPATHY: ICD-10-CM

## 2019-09-09 DIAGNOSIS — M47.812 CERVICAL SPONDYLOSIS WITHOUT MYELOPATHY: ICD-10-CM

## 2019-09-09 DIAGNOSIS — M25.511 PAIN IN JOINT OF RIGHT SHOULDER: ICD-10-CM

## 2019-09-09 PROCEDURE — 73030 X-RAY EXAM OF SHOULDER: CPT

## 2019-09-10 ENCOUNTER — OFFICE VISIT (OUTPATIENT)
Dept: ORTHOPEDIC SURGERY | Facility: CLINIC | Age: 74
End: 2019-09-10

## 2019-09-10 VITALS — WEIGHT: 177.03 LBS | BODY MASS INDEX: 26.22 KG/M2 | HEART RATE: 73 BPM | HEIGHT: 69 IN | OXYGEN SATURATION: 97 %

## 2019-09-10 DIAGNOSIS — M50.30 DDD (DEGENERATIVE DISC DISEASE), CERVICAL: ICD-10-CM

## 2019-09-10 DIAGNOSIS — G89.29 CHRONIC RIGHT SHOULDER PAIN: Primary | ICD-10-CM

## 2019-09-10 DIAGNOSIS — M19.011 ARTHRITIS OF RIGHT ACROMIOCLAVICULAR JOINT: ICD-10-CM

## 2019-09-10 DIAGNOSIS — Z98.1 HISTORY OF FUSION OF CERVICAL SPINE: ICD-10-CM

## 2019-09-10 DIAGNOSIS — M25.511 CHRONIC RIGHT SHOULDER PAIN: Primary | ICD-10-CM

## 2019-09-10 DIAGNOSIS — M19.011 PRIMARY OSTEOARTHRITIS OF RIGHT SHOULDER: ICD-10-CM

## 2019-09-10 PROCEDURE — 20610 DRAIN/INJ JOINT/BURSA W/O US: CPT | Performed by: ORTHOPAEDIC SURGERY

## 2019-09-10 PROCEDURE — 99204 OFFICE O/P NEW MOD 45 MIN: CPT | Performed by: ORTHOPAEDIC SURGERY

## 2019-09-10 RX ORDER — MOMETASONE FUROATE 1 MG/G
OINTMENT TOPICAL
COMMUNITY
Start: 2019-08-27 | End: 2021-02-10

## 2019-09-10 RX ORDER — TRIAMCINOLONE ACETONIDE 40 MG/ML
40 INJECTION, SUSPENSION INTRA-ARTICULAR; INTRAMUSCULAR
Status: COMPLETED | OUTPATIENT
Start: 2019-09-10 | End: 2019-09-10

## 2019-09-10 RX ORDER — LIDOCAINE HYDROCHLORIDE 10 MG/ML
3 INJECTION, SOLUTION EPIDURAL; INFILTRATION; INTRACAUDAL; PERINEURAL
Status: COMPLETED | OUTPATIENT
Start: 2019-09-10 | End: 2019-09-10

## 2019-09-10 RX ADMIN — TRIAMCINOLONE ACETONIDE 40 MG: 40 INJECTION, SUSPENSION INTRA-ARTICULAR; INTRAMUSCULAR at 14:11

## 2019-09-10 RX ADMIN — LIDOCAINE HYDROCHLORIDE 3 ML: 10 INJECTION, SOLUTION EPIDURAL; INFILTRATION; INTRACAUDAL; PERINEURAL at 14:11

## 2019-09-10 NOTE — PROGRESS NOTES
[facility]    Subjective     Pain of the Right Shoulder (Shoulder pain for 20+yrs. Physical therapy in the past. On average, 7-8/10 sharp pain. Unable to do over-the-head activity. Tylenol for pain relief. No other modalities. )      SANDRA Douglas is a 74 y.o. male.  Patient is referred by Dr. Mcgregor for evaluation of his right shoulder.  This has been going on for the last 20+ years.  He is right-hand dominant.  He has had a cervical fusion and thinks a lot of his issues are secondary to his degenerative cervical spine.  He has difficulty with overhead activity.  When the pain occurs, he rates it as 8 out of 10.  It is anterolateral.  It never goes below the elbow.  He takes Tylenol for pain relief.  He has stage III kidney disease.  He is with his wife today.    Past Medical History:   Diagnosis Date   • Allergic rhinitis    • Anxiety    • Chronic neck pain    • COPD (chronic obstructive pulmonary disease) (CMS/[facility])    • Depression    • Diabetes mellitus (CMS/[facility])    • Duodenal adenoma    • Low back pain    • Sleep apnea     intolerant to CPAP      Past Surgical History:   Procedure Laterality Date   • BACK SURGERY  07/29/2009    Left minimally invasive L4-5 far lateral lumbar discectomy Dr. Levi Mcgregor ([facility])   • CERVICAL FUSION      C5-6, C6-7 ACDF (unknown date, unknown surgeon)   • EYE SURGERY     • INTERVENTIONAL RADIOLOGY PROCEDURE N/A 8/5/2019    Procedure: IR myelogram cervical spine;  Surgeon: Vern Cunningham MD;  Location: [facility] INVASIVE LOCATION;  Service: Interventional Radiology   • SINUS SURGERY        Family History   Problem Relation Age of Onset   • Coronary artery disease Mother    • Hypertension Mother    • Pancreatic cancer Father    • Coronary artery disease Father      Social History     Socioeconomic History   • Marital status:      Spouse name: Not on file   • Number of children: Not on file   • Years of education: Not on file    • Highest education level: Not on file   Tobacco Use   • Smoking status: Former Smoker   • Smokeless tobacco: Never Used   • Tobacco comment: quit smoking in 2007   Substance and Sexual Activity   • Alcohol use: No     Frequency: Never   • Drug use: No   • Sexual activity: Defer      Current Outpatient Medications on File Prior to Visit   Medication Sig Dispense Refill   • albuterol (PROVENTIL HFA;VENTOLIN HFA) 108 (90 BASE) MCG/ACT inhaler Inhale 2 puffs Every 6 (Six) Hours As Needed. ProAir  (90 Base) MCG/ACT Inhalation Aerosol Solution; Patient Sig: ProAir  (90 Base) MCG/ACT Inhalation Aerosol Solution ; 0; 08-Aug-2013; Active     • ALPRAZolam (XANAX) 1 MG tablet TAKE ONE TABLET BY MOUTH THREE TIMES A DAY FOR ANXIETY 90 tablet 2   • atorvastatin (LIPITOR) 10 MG tablet TAKE ONE TABLET BY MOUTH DAILY 90 tablet 0   • budesonide-formoterol (SYMBICORT) 160-4.5 MCG/ACT inhaler Inhale 2 puffs 2 (two) times a day.     • diclofenac (VOLTAREN) 1 % gel gel Apply 4 g topically to the appropriate area as directed 4 (Four) Times a Day As Needed (pain). 1 tube 0   • DULoxetine (CYMBALTA) 60 MG capsule Take 1 capsule by mouth Daily. 90 capsule 1   • furosemide (LASIX) 20 MG tablet 2 po q am and one po q pm (Patient taking differently: Take 40 mg by mouth Daily With Lunch.) 270 tablet 1   • glucose blood (BRODERICK CONTOUR TEST) test strip Test once daily  DX E11.8 100 each 1   • imipramine (TOFRANIL) 25 MG tablet Take 1 tablet by mouth Every Night. 30 tablet 5   • ipratropium (ATROVENT) 0.06 % nasal spray 2 sprays into each nostril 4 (Four) Times a Day. (Patient taking differently: 2 sprays into the nostril(s) as directed by provider 4 (Four) Times a Day As Needed.) 1 each 3   • lactulose (CEPHULAC) 10 g packet Take 1 packet by mouth Daily As Needed (constipation). 15 each 0   • loratadine (CLARITIN) 10 MG tablet Take 1 tablet by mouth Daily. (Patient taking differently: Take 10 mg by mouth Daily As Needed.) 30  "tablet 5   • mometasone (ELOCON) 0.1 % ointment      • omeprazole (priLOSEC) 20 MG capsule Take 1 capsule by mouth Daily. 90 capsule 3   • OXcarbazepine (TRILEPTAL) 300 MG tablet Take 1 tablet by mouth 2 (Two) Times a Day. (Patient taking differently: Take 300 mg by mouth Every Night.) 180 tablet 1   • SITagliptin (JANUVIA) 100 MG tablet Take 1 tablet by mouth Daily. 28 tablet 0   • Triamcinolone Acetonide (NASACORT) 55 MCG/ACT nasal inhaler 2 sprays into the nostril(s) as directed by provider Daily. 16.5 g 5   • [DISCONTINUED] HYDROcodone-acetaminophen (NORCO) 5-325 MG per tablet Take 1 tablet by mouth Every 6 (Six) Hours As Needed for Moderate Pain . 15 tablet 0     No current facility-administered medications on file prior to visit.       Allergies   Allergen Reactions   • Amitriptyline Other (See Comments)     Caused vision and hearing problems and constipation   • Lyrica [Pregabalin] Confusion        The following portions of the patient's history were reviewed and updated as appropriate: allergies, current medications, past family history, past medical history, past social history, past surgical history and problem list.    Review of Systems   Constitutional: Negative.    HENT: Negative.    Eyes: Negative.    Respiratory: Negative.    Cardiovascular: Negative.    Gastrointestinal: Negative.    Endocrine: Negative.    Genitourinary: Negative.    Musculoskeletal: Positive for arthralgias, back pain, joint swelling, neck pain and neck stiffness.   Skin: Negative.    Allergic/Immunologic: Negative.    Neurological: Negative.    Hematological: Negative.    Psychiatric/Behavioral: Negative.         Objective      Physical Exam  Pulse 73   Ht 176 cm (69.29\")   Wt 80.3 kg (177 lb 0.5 oz)   SpO2 97%   BMI 25.92 kg/m²     Body mass index is 25.92 kg/m².    General  Mental Status - alert  General Appearance - cooperative, well groomed, not in acute distress  Orientation - Oriented X3  Build & Nutrition - well " developed and well nourished  Posture - normal posture  Gait - normal gait     Integumentary  Global Assessment  Examination of related systems reveals - no lymphadenopathy  Ears:  No abnormality  Nose:  No mucous drainage  General Characteristics  Overall examination of the patient's skin reveals - no rashes, no evidence of scars, no suspicious lesions and no bruises.  Color - normal coloration of skin.  Vascular: Brisk capillary refill in all extremities    Ortho Exam  Musculoskeletal   Upper Extremity   Right Shoulder     Inspection and Palpation:     Medial border scapular tenderness-none    AC Joint Tenderness -mild    Sensation is normal    Examination reveals no ecchymosis.        Strength and Tone:    Supraspinatus -4 out of 5 with pain    External Rotators-4 out of 5 with pain    Infraspinatus - 5/5    Subscapularis - 5/5    Deltoid - 5/5     Range of Motion      RightShoulder:    Internal Rotation: ROM - L4    External Rotation: AROM - 60 degrees    Elevation through flexion: AROM -90 degrees passive forward elevation 150       Impingement   Right shoulder    Harding-Jakob impingement test positive    Neer impingement test negative     Functional Testing   Right shoulder    AC crossover adduction test negative    Speeds test negative    Uppercut test negative    O'Briens test negative    Drop arm sign negative    Apprehension relocation negative      Imaging/Studies  Imaging Results (last 24 hours)     ** No results found for the last 24 hours. **      Review of right shoulder x-rays from 9/9/2019 from [facility] are performed this afternoon.  We have attempted to look at the report but no report is available.  Patient has moderate AC joint arthritis.  There are inferior osteophytes of the glenoid consistent with early degenerative changes of the glenohumeral joint.  There are changes at the greater tuberosity consistent with chronic cuff pathology.  There is a type II acromion.      Assessment:  1. Chronic  right shoulder pain    2. DDD (degenerative disc disease), cervical    3. History of fusion of cervical spine    4. Primary osteoarthritis of right shoulder    5. Arthritis of right acromioclavicular joint        Plan:  1. Continue over-the-counter medication as needed for discomfort  2. History of cervical spine fusion--per Dr. Mcgregor  3. Chronic right shoulder pain--likely rotator cuff tear.  Subacromial injection and physical therapy will be given today.  He will do his physical therapy at [facility] in [place].  I will see him back in about 6 to 8 weeks and if he is not a lot better, an MRI will be requested of his right shoulder.  4. Osteoarthritis right shoulder--observe for now.  5. AC joint arthritis right shoulder--observe for now.  Does not appear to be clinically relevant.        Luisito Khan MD  09/10/19  2:08 PM

## 2019-09-10 NOTE — PROGRESS NOTES
Procedure   Large Joint Arthrocentesis: R subacromial bursa  Date/Time: 9/10/2019 2:11 PM  Consent given by: patient  Site marked: site marked  Timeout: Immediately prior to procedure a time out was called to verify the correct patient, procedure, equipment, support staff and site/side marked as required   Supporting Documentation  Indications: pain   Procedure Details  Location: shoulder - R subacromial bursa  Preparation: Patient was prepped and draped in the usual sterile fashion  Needle size: 25 G  Approach: posterior  Medications administered: 40 mg triamcinolone acetonide 40 MG/ML; 3 mL lidocaine PF 1% 1 %  Patient tolerance: patient tolerated the procedure well with no immediate complications

## 2019-09-21 DIAGNOSIS — M79.605 PAIN IN BOTH LOWER EXTREMITIES: ICD-10-CM

## 2019-09-21 DIAGNOSIS — M79.604 PAIN IN BOTH LOWER EXTREMITIES: ICD-10-CM

## 2019-09-23 RX ORDER — IMIPRAMINE HCL 25 MG
TABLET ORAL
Qty: 30 TABLET | Refills: 4 | Status: SHIPPED | OUTPATIENT
Start: 2019-09-23 | End: 2019-10-21 | Stop reason: SDUPTHER

## 2019-09-25 ENCOUNTER — TELEPHONE (OUTPATIENT)
Dept: FAMILY MEDICINE CLINIC | Facility: CLINIC | Age: 74
End: 2019-09-25

## 2019-09-25 DIAGNOSIS — G89.29 CHRONIC MIDLINE LOW BACK PAIN, WITH SCIATICA PRESENCE UNSPECIFIED: Primary | ICD-10-CM

## 2019-09-25 DIAGNOSIS — M54.5 CHRONIC MIDLINE LOW BACK PAIN, WITH SCIATICA PRESENCE UNSPECIFIED: Primary | ICD-10-CM

## 2019-09-25 NOTE — TELEPHONE ENCOUNTER
NATASHA  PATIENT WANTS TO KNOW IF YOU WILL SEND IN AN ORDER TO [facility] FOR HIS BACK. THEY ARE CURRENTLY WORKING ON HIS SHOULDER NOW.  [phone]

## 2019-10-16 DIAGNOSIS — I10 ESSENTIAL HYPERTENSION: ICD-10-CM

## 2019-10-16 RX ORDER — FUROSEMIDE 20 MG/1
TABLET ORAL
Qty: 270 TABLET | Refills: 1 | Status: SHIPPED | OUTPATIENT
Start: 2019-10-16 | End: 2020-02-03 | Stop reason: SDUPTHER

## 2019-10-20 NOTE — PROGRESS NOTES
"Subjective   Antione Douglas is a 74 y.o. male.   Chief Complaint   Patient presents with   • Diabetes     4 month follow-up. Refill januvia   • Hyperlipidemia   • Hypertension   • Chronic Kidney Disease   • Anxiety   • B12 deficiency   • degenerative disc disease   • Leukocytosis   • chronic neck pain     History of Present Illness     Patient returns today for follow-up of chronic medical conditions as noted above.      I last saw him in June, his labs all looked relatively stable.  His hemoglobin A1c was at 6.4%.  I also noted to him that his relatively low blood pressure may been causing some of his kidney dysfunction.  We did cut his Lasix in half and reported that his dizziness was improved.  We recommended he continue to avoid anti-inflammatory medications, stay well-hydrated and recommended a BMP in 6 to 8 weeks.  We did see a slight improvement in his kidney function in late July and will recheck again today.    He saw Dr. Justine Gómez on 7-1-19 for neck and Dr. Gomez on 7-3-19 for his neck pain and neuropathy.     Dr. Gomez believed he was a good candidate for epidural injections    Ultimately underwent a myelogram that Dr. Argueta of neurosurgery arranged for on August 5.  He follow back up with Dr. Mcgregor his neurosurgeon.  Dr. Mcgregor reviewed the cervical radicular and myelogram.  His EMG showed no evidence of cervical radiculopathy with a moderate motor peripheral neuropathy consistent with diabetes and alcohol use.  He felt as if he had advanced degenerative changes at C4-5 but no evidence of compressive myelopathy.  He felt like he had a limited range of motion in his right shoulder and wanted that evaluated as a potential source for his pain.    In absence of a true radiculopathy on the EMG study, Dr. Mcgregor recommended deferring any surgical interventions.  He was afraid it really would not assist him in his quality of life but to revisit with him if he had a \"brick wall\" with pain management " shoulder evaluation.  He believed that most of his numbness and walking issues were due to his motor neuropathy.    He was then evaluated by Dr. Khan felt like he had a likely rotator cuff tear and gave him a subacromial steroid injection and a referral to physical therapy.  If he was not doing better an MRI would be requested of his right shoulder.  He has follow-up with him in early November.  Since that time of the injection and PT things are getting better.    Is now close to 1 year since he quit drinking any alcohol.    Antione has history of PNCV testing that has demonstrated a moderate sensorimotor polyneuropathy in the arms and legs.  This was consistent with a diabetic polyneuropathy.  Dr. Gómez confirmed her findings.  She noted that Antione had a cane at home and no other needs for adaptive equipment.  He was taking Cymbalta and Elavil in the past has not tolerated Neurontin.  He told her that he felt his medications were adequate at the time.  She would follow up with him as needed.    The following portions of the patient's history were reviewed and updated as appropriate: allergies, current medications, past medical history, past social history and problem list.    Review of Systems   Constitutional: Negative for unexpected weight loss.   HENT: Negative.    Eyes: Negative for blurred vision.   Respiratory: Negative.    Cardiovascular: Negative.  Negative for chest pain and leg swelling.   Gastrointestinal: Negative.    Endocrine: Negative for polyuria.   Musculoskeletal: Positive for arthralgias (bilat shoulder pain), back pain and neck pain.   Skin: Negative.    Neurological: Positive for numbness (bilateral feet). Negative for dizziness and headache.   Psychiatric/Behavioral: Nervous/anxious: stable.        Objective   Physical Exam   Constitutional: He appears well-developed and well-nourished. No distress.   Eyes: Conjunctivae are normal. No scleral icterus.   Neck: Carotid bruit is not present.    Cardiovascular: Normal rate, regular rhythm and normal heart sounds.   No murmur heard.  Pulmonary/Chest: Effort normal and breath sounds normal. He has no wheezes.   Musculoskeletal: He exhibits no edema.   He is a little unstable on his feet and uses a 4 pronged cane for assistance.    Antione had a diabetic foot exam performed today.  Lymphadenopathy:     He has no cervical adenopathy.   Neurological: He is alert. Sensory deficit: Unchanged in stocking distribution    Skin: Skin is warm and dry.   Psychiatric: He has a normal mood and affect. His behavior is normal.   Nursing note and vitals reviewed.        Assessment/Plan   Antione was seen today for diabetes, hyperlipidemia, hypertension, chronic kidney disease, anxiety, b12 deficiency, degenerative disc disease, leukocytosis and chronic neck pain.    Diagnoses and all orders for this visit:    Type 2 diabetes mellitus without complication, without long-term current use of insulin (CMS/[facility])  Comments:  I expect to see stability today  Orders:  -     Hemoglobin A1c  -     SITagliptin (JANUVIA) 100 MG tablet; Take 1 tablet by mouth Daily.    Essential hypertension  -     Comprehensive Metabolic Panel  -     CBC & Differential    Mixed hyperlipidemia  Comments:  Recheck in 6 months    Chronic kidney disease, stage 3 (CMS/[facility])  -     Comprehensive Metabolic Panel    Generalized anxiety disorder  -     Pain Management Profile (13 Drugs) Urine - Urine, Clean Catch    Encounter for immunization  -     Fluzone High Dose =>65Years    Cobalamin deficiency  -     Vitamin B12    DDD (degenerative disc disease), cervical    Leukocytosis, unspecified type  -     CBC & Differential    Chronic neck pain  -     Pain Management Profile (13 Drugs) Urine - Urine, Clean Catch    Pain in both lower extremities  Comments:  Secondary to neuropathy.  Will increase his imipramine from 25 to 50 mg nightly.  Orders:  -     imipramine (TOFRANIL) 25 MG tablet; Take 1 tablet by mouth Every  Night. May use 2 qhs    Other orders  -     lidocaine (LIDODERM) 5 %; Place 1 patch on the skin as directed by provider Daily. Remove & Discard patch within 12 hours or as directed by MD    Keep plan for physical therapy for his shoulder and back.  Congratulated him on his continuing abstinence from alcohol and control of his diabetes.  Did discuss continue to modify his diet towards a whole foods, plant-based diet for best overall outcomes.    I will see him in 4 to 6 months unless the labs dictate otherwise where he has any acute needs         Vahe Coronel MD  10/21/2019

## 2019-10-21 ENCOUNTER — OFFICE VISIT (OUTPATIENT)
Dept: FAMILY MEDICINE CLINIC | Facility: CLINIC | Age: 74
End: 2019-10-21

## 2019-10-21 VITALS
SYSTOLIC BLOOD PRESSURE: 112 MMHG | RESPIRATION RATE: 18 BRPM | WEIGHT: 178 LBS | DIASTOLIC BLOOD PRESSURE: 66 MMHG | HEART RATE: 68 BPM | BODY MASS INDEX: 26.36 KG/M2 | HEIGHT: 69 IN | TEMPERATURE: 97.4 F

## 2019-10-21 DIAGNOSIS — M50.30 DDD (DEGENERATIVE DISC DISEASE), CERVICAL: ICD-10-CM

## 2019-10-21 DIAGNOSIS — I10 ESSENTIAL HYPERTENSION: ICD-10-CM

## 2019-10-21 DIAGNOSIS — N18.30 CHRONIC KIDNEY DISEASE, STAGE 3 (HCC): ICD-10-CM

## 2019-10-21 DIAGNOSIS — G89.29 CHRONIC NECK PAIN: ICD-10-CM

## 2019-10-21 DIAGNOSIS — F41.1 GENERALIZED ANXIETY DISORDER: ICD-10-CM

## 2019-10-21 DIAGNOSIS — E11.9 TYPE 2 DIABETES MELLITUS WITHOUT COMPLICATION, WITHOUT LONG-TERM CURRENT USE OF INSULIN (HCC): Primary | ICD-10-CM

## 2019-10-21 DIAGNOSIS — E53.8 COBALAMIN DEFICIENCY: ICD-10-CM

## 2019-10-21 DIAGNOSIS — M79.604 PAIN IN BOTH LOWER EXTREMITIES: ICD-10-CM

## 2019-10-21 DIAGNOSIS — Z23 ENCOUNTER FOR IMMUNIZATION: ICD-10-CM

## 2019-10-21 DIAGNOSIS — M54.2 CHRONIC NECK PAIN: ICD-10-CM

## 2019-10-21 DIAGNOSIS — E78.2 MIXED HYPERLIPIDEMIA: ICD-10-CM

## 2019-10-21 DIAGNOSIS — M79.605 PAIN IN BOTH LOWER EXTREMITIES: ICD-10-CM

## 2019-10-21 DIAGNOSIS — D72.829 LEUKOCYTOSIS, UNSPECIFIED TYPE: ICD-10-CM

## 2019-10-21 PROCEDURE — 90653 IIV ADJUVANT VACCINE IM: CPT | Performed by: FAMILY MEDICINE

## 2019-10-21 PROCEDURE — 99214 OFFICE O/P EST MOD 30 MIN: CPT | Performed by: FAMILY MEDICINE

## 2019-10-21 PROCEDURE — G0008 ADMIN INFLUENZA VIRUS VAC: HCPCS | Performed by: FAMILY MEDICINE

## 2019-10-21 RX ORDER — LIDOCAINE 50 MG/G
1 PATCH TOPICAL EVERY 24 HOURS
Qty: 30 EACH | Refills: 1 | Status: SHIPPED | OUTPATIENT
Start: 2019-10-21 | End: 2020-02-03 | Stop reason: SINTOL

## 2019-10-21 RX ORDER — IMIPRAMINE HCL 25 MG
25 TABLET ORAL NIGHTLY
Qty: 180 TABLET | Refills: 2 | Status: SHIPPED | OUTPATIENT
Start: 2019-10-21 | End: 2021-02-10

## 2019-10-21 RX ORDER — AMOXICILLIN 500 MG/1
CAPSULE ORAL
COMMUNITY
Start: 2019-10-11 | End: 2020-01-21

## 2019-10-22 LAB
ALBUMIN SERPL-MCNC: 4.6 G/DL (ref 3.5–5.2)
ALBUMIN/GLOB SERPL: 1.5 G/DL
ALP SERPL-CCNC: 133 U/L (ref 39–117)
ALT SERPL-CCNC: 17 U/L (ref 1–41)
AST SERPL-CCNC: 17 U/L (ref 1–40)
BASOPHILS # BLD AUTO: 0.08 10*3/MM3 (ref 0–0.2)
BASOPHILS NFR BLD AUTO: 0.8 % (ref 0–1.5)
BILIRUB SERPL-MCNC: 0.3 MG/DL (ref 0.2–1.2)
BUN SERPL-MCNC: 16 MG/DL (ref 8–23)
BUN/CREAT SERPL: 11.5 (ref 7–25)
CALCIUM SERPL-MCNC: 9.5 MG/DL (ref 8.6–10.5)
CHLORIDE SERPL-SCNC: 93 MMOL/L (ref 98–107)
CO2 SERPL-SCNC: 28.4 MMOL/L (ref 22–29)
CREAT SERPL-MCNC: 1.39 MG/DL (ref 0.76–1.27)
EOSINOPHIL # BLD AUTO: 0.11 10*3/MM3 (ref 0–0.4)
EOSINOPHIL NFR BLD AUTO: 1.1 % (ref 0.3–6.2)
ERYTHROCYTE [DISTWIDTH] IN BLOOD BY AUTOMATED COUNT: 14.2 % (ref 12.3–15.4)
GLOBULIN SER CALC-MCNC: 3.1 GM/DL
GLUCOSE SERPL-MCNC: 95 MG/DL (ref 65–99)
HBA1C MFR BLD: 6.2 % (ref 4.8–5.6)
HCT VFR BLD AUTO: 46.5 % (ref 37.5–51)
HGB BLD-MCNC: 15.6 G/DL (ref 13–17.7)
IMM GRANULOCYTES # BLD AUTO: 0.05 10*3/MM3 (ref 0–0.05)
IMM GRANULOCYTES NFR BLD AUTO: 0.5 % (ref 0–0.5)
LYMPHOCYTES # BLD AUTO: 2.4 10*3/MM3 (ref 0.7–3.1)
LYMPHOCYTES NFR BLD AUTO: 24.7 % (ref 19.6–45.3)
MCH RBC QN AUTO: 29.7 PG (ref 26.6–33)
MCHC RBC AUTO-ENTMCNC: 33.5 G/DL (ref 31.5–35.7)
MCV RBC AUTO: 88.4 FL (ref 79–97)
MONOCYTES # BLD AUTO: 0.77 10*3/MM3 (ref 0.1–0.9)
MONOCYTES NFR BLD AUTO: 7.9 % (ref 5–12)
NEUTROPHILS # BLD AUTO: 6.31 10*3/MM3 (ref 1.7–7)
NEUTROPHILS NFR BLD AUTO: 65 % (ref 42.7–76)
NRBC BLD AUTO-RTO: 0.1 /100 WBC (ref 0–0.2)
PLATELET # BLD AUTO: 305 10*3/MM3 (ref 140–450)
POTASSIUM SERPL-SCNC: 4.9 MMOL/L (ref 3.5–5.2)
PROT SERPL-MCNC: 7.7 G/DL (ref 6–8.5)
RBC # BLD AUTO: 5.26 10*6/MM3 (ref 4.14–5.8)
SODIUM SERPL-SCNC: 137 MMOL/L (ref 136–145)
VIT B12 SERPL-MCNC: 402 PG/ML (ref 211–946)
WBC # BLD AUTO: 9.72 10*3/MM3 (ref 3.4–10.8)

## 2019-11-03 DIAGNOSIS — J30.2 SEASONAL ALLERGIES: ICD-10-CM

## 2019-11-04 RX ORDER — LORATADINE 10 MG/1
TABLET ORAL
Qty: 30 TABLET | Refills: 5 | Status: SHIPPED | OUTPATIENT
Start: 2019-11-04 | End: 2020-05-21

## 2019-11-17 DIAGNOSIS — E78.5 HYPERLIPIDEMIA, UNSPECIFIED HYPERLIPIDEMIA TYPE: ICD-10-CM

## 2019-11-18 ENCOUNTER — TELEPHONE (OUTPATIENT)
Dept: PEDIATRICS | Facility: OTHER | Age: 74
End: 2019-11-18

## 2019-11-18 DIAGNOSIS — M79.605 PAIN IN BOTH LOWER EXTREMITIES: ICD-10-CM

## 2019-11-18 DIAGNOSIS — M79.604 PAIN IN BOTH LOWER EXTREMITIES: ICD-10-CM

## 2019-11-18 RX ORDER — IMIPRAMINE HCL 25 MG
25 TABLET ORAL NIGHTLY
Qty: 180 TABLET | Refills: 2 | Status: CANCELLED | OUTPATIENT
Start: 2019-11-18

## 2019-11-18 RX ORDER — ATORVASTATIN CALCIUM 10 MG/1
TABLET, FILM COATED ORAL
Qty: 90 TABLET | Refills: 0 | Status: SHIPPED | OUTPATIENT
Start: 2019-11-18 | End: 2020-02-03 | Stop reason: SDUPTHER

## 2019-11-18 NOTE — TELEPHONE ENCOUNTER
LM for pt's wife to call us back. What pharmacy am I calling? Two in the computer and when did dose change?

## 2019-11-18 NOTE — TELEPHONE ENCOUNTER
Called [facility] and canceled out the 1 po qhs Tofranil and told them to fill the 2 po qhs #180 Rx. Spoke w/ Barbara and informed pt's wife.

## 2019-11-18 NOTE — TELEPHONE ENCOUNTER
Patients wife Maggie called and stated pharmacy will not fill new RX due to old RX refill being filled in December. Patient needs old prescription cancelled so that new RX can be submitted.

## 2019-11-26 ENCOUNTER — TELEPHONE (OUTPATIENT)
Dept: FAMILY MEDICINE CLINIC | Facility: CLINIC | Age: 74
End: 2019-11-26

## 2019-11-26 ENCOUNTER — OFFICE VISIT (OUTPATIENT)
Dept: ORTHOPEDIC SURGERY | Facility: CLINIC | Age: 74
End: 2019-11-26

## 2019-11-26 VITALS — BODY MASS INDEX: 25.62 KG/M2 | HEART RATE: 111 BPM | HEIGHT: 69 IN | WEIGHT: 173 LBS | OXYGEN SATURATION: 95 %

## 2019-11-26 DIAGNOSIS — M25.511 CHRONIC RIGHT SHOULDER PAIN: Primary | ICD-10-CM

## 2019-11-26 DIAGNOSIS — Z98.1 HISTORY OF FUSION OF CERVICAL SPINE: ICD-10-CM

## 2019-11-26 DIAGNOSIS — M19.011 ARTHRITIS OF RIGHT ACROMIOCLAVICULAR JOINT: ICD-10-CM

## 2019-11-26 DIAGNOSIS — G89.29 CHRONIC RIGHT SHOULDER PAIN: Primary | ICD-10-CM

## 2019-11-26 DIAGNOSIS — L89.309 PRESSURE INJURY OF SKIN OF BUTTOCK, UNSPECIFIED INJURY STAGE, UNSPECIFIED LATERALITY: Primary | ICD-10-CM

## 2019-11-26 DIAGNOSIS — M50.30 DDD (DEGENERATIVE DISC DISEASE), CERVICAL: ICD-10-CM

## 2019-11-26 PROCEDURE — 99213 OFFICE O/P EST LOW 20 MIN: CPT | Performed by: ORTHOPAEDIC SURGERY

## 2019-11-26 NOTE — TELEPHONE ENCOUNTER
PATIENT STATES NATASHA REFERRED HIM TO SEE DR DIGNA HOPE AND THAT WAS A WASTE OF TIME.  HE WANTS TO KNOW IF THERE IS SOMEONE ELSE HE CAN SEE.  HE STATES ITS A PRESSURE ULCER ON HIS BOTTOM.

## 2019-11-26 NOTE — PROGRESS NOTES
"    [facility]    Subjective     CC: Follow-up (11 week follow up; Chronic right shoulder pain-subacromial injection given at last visit 9/10/19)      SANDRA Douglas is a 74 y.o. male.  Patient is here today for follow-up after his subacromial injection was given on 9/10/2019.  He was sent to physical therapy as well.  He tells me overall, he is much better.  Patient is also been seeing [facility] in [place].       ROS:    Constiutional:Pt denies fever, chills, nausea, or vomiting.  MSK:as above    Objective      Past Medical History  Past Medical History:   Diagnosis Date   • Allergic rhinitis    • Anxiety    • Chronic neck pain    • COPD (chronic obstructive pulmonary disease) (CMS/[facility])    • Depression    • Diabetes mellitus (CMS/[facility])    • Duodenal adenoma    • Low back pain    • Sleep apnea     intolerant to CPAP         Physical Exam  Pulse 111   Ht 175.9 cm (69.25\")   Wt 78.5 kg (173 lb)   SpO2 95%   BMI 25.36 kg/m²     Body mass index is 25.36 kg/m².    Patient is well nourished and well developed.        Ortho Exam  Forward elevation 150  External rotation 70  Internal rotation L5-S1  4/5 supraspinatus with pain  5 out of 5 subscap    Imaging/Labs/EMG Reviewed:  Imaging Results (Last 24 Hours)     ** No results found for the last 24 hours. **          Assessment:  1. Chronic right shoulder pain    2. DDD (degenerative disc disease), cervical    3. History of fusion of cervical spine    4. Arthritis of right acromioclavicular joint        Plan:  1. Recommend over the counter anti-inflammatories for pain and/or swelling  2. Chronic right shoulder pain with AC joint arthritis--improved after subacromial injection and physical therapy.  He should continue the therapy for now.  Hold off on any further imaging.  Patient is not interested in surgical intervention and there are enough medical issues and issues with his cervical spine that I think this would be a fairly significant " hit to his system may not be his best option.  Patient can call back or come back if he needs further treatment on the right shoulder.  3. Cervical spondylosis--observe for now.  Defer to Dr. Mcgregor in this regard      Luisito Khan MD  11/26/19  6:01 PM

## 2019-11-26 NOTE — TELEPHONE ENCOUNTER
Patient informed that urgent treatment should be contacting him in regards to a referral for wound care. He was agreeable to that and will await their call. He verbalized understanding and had no further questions.

## 2019-11-26 NOTE — TELEPHONE ENCOUNTER
It looks like BRANDO Fox at [facility], placed an order for referral to the wound clinic and I would concur with that.

## 2019-12-05 ENCOUNTER — HOSPITAL ENCOUNTER (OUTPATIENT)
Dept: PHYSICAL THERAPY | Facility: HOSPITAL | Age: 74
Setting detail: THERAPIES SERIES
Discharge: HOME | End: 2019-12-05

## 2019-12-05 DIAGNOSIS — L89.312 PRESSURE INJURY OF RIGHT BUTTOCK, STAGE 2 (HCC): Primary | ICD-10-CM

## 2019-12-05 PROCEDURE — 97162 PT EVAL MOD COMPLEX 30 MIN: CPT

## 2019-12-05 PROCEDURE — 97610 LOW FREQUENCY NON-THERMAL US: CPT

## 2019-12-05 PROCEDURE — 97597 DBRDMT OPN WND 1ST 20 CM/<: CPT

## 2019-12-05 NOTE — THERAPY EVALUATION
Outpatient Rehabilitation - Wound/Debridement Initial Eval   [place]     Patient Name: Antione Douglas  : 1945  MRN: [id number]  Today's Date: 2019                  Admit Date: 2019    Visit Dx:    ICD-10-CM ICD-9-CM   1. Pressure injury of right buttock, stage 2 (CMS/HCC) L89.312 707.05     707.22       Patient Active Problem List   Diagnosis   • Atopic rhinitis   • Chronic obstructive pulmonary disease (CMS/HCC)   • Type 2 diabetes mellitus without complication, without long-term current use of insulin (CMS/HCC)   • Generalized anxiety disorder   • Essential hypertension   • Primary insomnia   • Arthralgia of hip   • Chronic bilateral low back pain   • Cobalamin deficiency   • Asymptomatic PVCs   • Chronic idiopathic constipation   • Chronic kidney disease, stage 3 (CMS/HCC)   • Diabetic peripheral neuropathy (CMS/HCC)   • Benign prostatic hyperplasia with urinary hesitancy   • Leukocytosis   • Peripheral vascular disease of foot (CMS/HCC)   • Mixed hyperlipidemia   • DDD (degenerative disc disease), cervical   • History of fusion of cervical spine        Past Medical History:   Diagnosis Date   • Allergic rhinitis    • Anxiety    • Chronic neck pain    • COPD (chronic obstructive pulmonary disease) (CMS/HCC)    • Depression    • Diabetes mellitus (CMS/HCC)    • Duodenal adenoma    • Low back pain    • Sleep apnea     intolerant to CPAP        Past Surgical History:   Procedure Laterality Date   • BACK SURGERY  2009    Left minimally invasive L4-5 far lateral lumbar discectomy Dr. Levi Mcgregor ([facility])   • CERVICAL FUSION      C5-6, C6-7 ACDF (unknown date, unknown surgeon)   • EYE SURGERY     • INTERVENTIONAL RADIOLOGY PROCEDURE N/A 2019    Procedure: IR myelogram cervical spine;  Surgeon: Vern Cunningham MD;  Location: [facility] INVASIVE LOCATION;  Service: Interventional Radiology   • SINUS SURGERY         Patient History     Row Name 19 7535              History    Chief Complaint  Ulcer, wound or other skin conditions;Pain  -      Type of Pain  Other pain R buttock wound  -      Brief Description of Current Complaint  Pt reports 3 year h/o recurrent wound to right buttock.  Pt states he sits a lot due to back problems, but has been using a donut cushion for offloading.  States he has seen multiple providers, including Dermatology, had biopsy that was negative, has tried multiple medicated creams/ointments without improvement.  Pt now referred by [facility] provider for PT wound care.  Has not had any wound care services for current problem.  -      Previous treatment for THIS PROBLEM  Medication multiple rx ointments/creams  -      Patient/Caregiver Goals  Heal wound  -      Patient's Rating of General Health  Fair  -      Occupation/sports/leisure activities  Retired,  with supportive spouse who has been performing dressing changes/wound care.  Reports limited activity due to back pain.  -         Pain     Pain Location  Buttocks  -      Pain at Present  2  -      Difficulties with ADL's?  pain in wound causing difficulty sitting  -         Fall Risk Assessment    Any falls in the past year:  Unspecified  -         Daily Activities    Teaching needs identified  Management of Condition  -      Barriers to learning  None  -      Pt Participated in POC and Goals  Yes  -        User Key  (r) = Recorded By, (t) = Taken By, (c) = Cosigned By    Initials Name Provider Type    Sheryl Bowman, PT Physical Therapist          EVALUATION  PT Ortho     Row Name 12/05/19 6151       Subjective Comments    Subjective Comments  Pt reports he currently has not been bandaging the area.  Has not been to any wound care providers for tx.  -       Subjective Pain    Able to rate subjective pain?  yes  -LISA    Pre-Treatment Pain Level  2  -    Post-Treatment Pain Level  2  -       Transfers    Sit-Stand [place] (Transfers)  independent  -     "Stand-Sit [place] (Transfers)  independent  -    Comment (Transfers)  prone on stretcher for tx  -JM       Gait/Stairs Assessment/Training    [place] Level (Gait)  independent  -    Assistive Device (Gait)  cane, straight  -      User Key  (r) = Recorded By, (t) = Taken By, (c) = Cosigned By    Initials Name Provider Type    Sheryl Bowman, PT Physical Therapist        LDA Wound     Row Name 12/05/19 1345             Wound 12/05/19 1345 Right gluteal Pressure Injury    Wound - Properties Group Date first assessed: 12/05/19  - Time first assessed: 1345  -JM Side: Right  - Location: gluteal  - Primary Wound Type: Pressure inj  - Stage, Pressure Injury: Stage 2  -    Wound Image  Images linked: 2  -JM      Dressing Appearance  open to air  -      Base  dry;red;granulating;yellow;slough  -      Periwound  intact;dry;redness;excoriated  -      Periwound Temperature  warm  -      Periwound Skin Turgor  soft  -      Edges  open  -      Wound Length (cm)  1.2 cm  -      Wound Width (cm)  0.9 cm  -      Wound Depth (cm)  0.2 cm  -JM      Drainage Characteristics/Odor  serosanguineous  -      Drainage Amount  scant  -JM      Care, Wound  cleansed with;wound cleanser;debrided;ultrasound therapy, non contact low frequency;honey applied MIST 6min  -JM      Dressing Care, Wound  dressing applied;collagen;foam;border dressing aubrie, 4\" optifoam gentle  -      Periwound Care, Wound  cleansed with pH balanced cleanser;dry periwound area maintained;barrier ointment applied z-guard to excoriated areas  -        User Key  (r) = Recorded By, (t) = Taken By, (c) = Cosigned By    Initials Name Provider Type    Sheryl Bowman, PT Physical Therapist            WOUND DEBRIDEMENT  Total area of Debridement: 1cmsq  Debridement Site 1  Location- Site 1: R buttock  Selective Debridement- Site 1: Wound Surface <20cmsq  Instruments- Site 1: tweezers  Excised Tissue Description- Site 1: " scant, slough  Bleeding- Site 1: none             Therapy Education     Row Name 12/05/19 5118             Therapy Education    Education Details  Keep wound bandaged at all times, offload using cushion and reposition frequently to reduce pressure to wound.  Keep dressing dry/intact, change every 2 days or if wet or disrupted.  Clean wound with skintegrity, wipe with gauze, apply therahoney, cut aubrie to fit into wound, cover with optifoam gentle.  -      Given  Symptoms/condition management;Bandaging/dressing change  -      Program  New  -      How Provided  Verbal;Demonstration  -      Provided to  Patient;Other (comment) spouse  -      Level of Understanding  Verbalized  -        User Key  (r) = Recorded By, (t) = Taken By, (c) = Cosigned By    Initials Name Provider Type    Sheryl Bowman, PT Physical Therapist          Recommendation and Plan  PT Assessment/Plan     Row Name 12/05/19 2949          PT Assessment    Functional Limitations  Performance in self-care ADL;Other (comment) wound management limitations  -     Impairments  Integumentary integrity;Pain  -     Assessment Comments  Pt presents with chronic non-healing wound to right buttock, appearance c/w pressure ulcer.  Wound bed dry with necrotic slough, debridement limited by dry tissues today.  Pt will benefit from skilled PT for wound debridement, dressings management, and MIST to increase local blood flow and cell activity to promote wound healing.  -     Rehab Potential  Good  -     Patient/caregiver participated in establishment of treatment plan and goals  Yes  -     Patient would benefit from skilled therapy intervention  Yes  -        PT Plan    PT Frequency  3x/week  -     Predicted Duration of Therapy Intervention (Therapy Eval)  15 visits  -     Planned CPT's?  PT EVAL MOD COMPLELITY: 36732;PT NLFU MIST: 45189;PT MARIYA DEBRIDE OPEN WOUND UP TO 20 CM: 03564;PT NONSELECT DEBRIDE 15 MIN: 52843;PT SELF  CARE/MGMT/TRAIN 15 MIN: 71504  -LISA     Physical Therapy Interventions (Optional Details)  bandaging;patient/family education;wound care  -     PT Plan Comments  MIST, debridement, aubrie  -       User Key  (r) = Recorded By, (t) = Taken By, (c) = Cosigned By    Initials Name Provider Type    Sheryl Bowman, PT Physical Therapist            Goals  PT OP Goals     Row Name 12/05/19 1345          PT Short Term Goals    STG 1  Patient and/ or caregiver able to verbalize signs and symptoms of infection.  -     STG 2  Decrease wound dimensions by 50% as evidence of wound closure.  -        Long Term Goals    LTG 1  Patient and/ or caregiver independent with clean dressing changes.  -     LTG 2  Decrease wound dimensions by 75% as evidence of wound closure.  -        Time Calculation    PT Goal Re-Cert Due Date  03/04/20  -       User Key  (r) = Recorded By, (t) = Taken By, (c) = Cosigned By    Initials Name Provider Type    Sheryl Bowman, PT Physical Therapist          Time Calculation: Start Time: 1345  Therapy Charges for Today     Code Description Service Date Service Provider Modifiers Qty    24276845991  MARIYA DEBRIDE OPEN WOUND UP TO 20CM 12/5/2019 Sheryl Sanchez, PT GP 1    [id number] HC PT NLFU MIST 12/5/2019 Sheryl Sanchez, PT GP 1    [id number]  PT EVAL MOD COMPLEXITY 4 12/5/2019 Sheryl Sanchez, PT GP 1                Sheryl Sanchez, PT  12/5/2019

## 2019-12-09 ENCOUNTER — HOSPITAL ENCOUNTER (OUTPATIENT)
Dept: PHYSICAL THERAPY | Facility: HOSPITAL | Age: 74
Setting detail: THERAPIES SERIES
Discharge: HOME | End: 2019-12-09

## 2019-12-09 DIAGNOSIS — L89.312 PRESSURE INJURY OF RIGHT BUTTOCK, STAGE 2 (HCC): Primary | ICD-10-CM

## 2019-12-09 PROCEDURE — 97610 LOW FREQUENCY NON-THERMAL US: CPT

## 2019-12-09 PROCEDURE — 97597 DBRDMT OPN WND 1ST 20 CM/<: CPT

## 2019-12-09 NOTE — THERAPY WOUND CARE TREATMENT
Outpatient Rehabilitation - Wound/Debridement Treatment Note   [place]     Patient Name: Antione Douglas  : 1945  MRN: [id number]  Today's Date: 2019                 Admit Date: 2019    Visit Dx:    ICD-10-CM ICD-9-CM   1. Pressure injury of right buttock, stage 2 (CMS/HCC) L89.312 707.05     707.22       Patient Active Problem List   Diagnosis   • Atopic rhinitis   • Chronic obstructive pulmonary disease (CMS/HCC)   • Type 2 diabetes mellitus without complication, without long-term current use of insulin (CMS/HCC)   • Generalized anxiety disorder   • Essential hypertension   • Primary insomnia   • Arthralgia of hip   • Chronic bilateral low back pain   • Cobalamin deficiency   • Asymptomatic PVCs   • Chronic idiopathic constipation   • Chronic kidney disease, stage 3 (CMS/HCC)   • Diabetic peripheral neuropathy (CMS/HCC)   • Benign prostatic hyperplasia with urinary hesitancy   • Leukocytosis   • Peripheral vascular disease of foot (CMS/HCC)   • Mixed hyperlipidemia   • DDD (degenerative disc disease), cervical   • History of fusion of cervical spine        Past Medical History:   Diagnosis Date   • Allergic rhinitis    • Anxiety    • Chronic neck pain    • COPD (chronic obstructive pulmonary disease) (CMS/HCC)    • Depression    • Diabetes mellitus (CMS/HCC)    • Duodenal adenoma    • Low back pain    • Sleep apnea     intolerant to CPAP        Past Surgical History:   Procedure Laterality Date   • BACK SURGERY  2009    Left minimally invasive L4-5 far lateral lumbar discectomy Dr. Levi Mcgregor ([facility])   • CERVICAL FUSION      C5-6, C6-7 ACDF (unknown date, unknown surgeon)   • EYE SURGERY     • INTERVENTIONAL RADIOLOGY PROCEDURE N/A 2019    Procedure: IR myelogram cervical spine;  Surgeon: Vern Cunningham MD;  Location:  [facility] INVASIVE LOCATION;  Service: Interventional Radiology   • SINUS SURGERY           EVALUATION  PT Ortho     Row Name 19 3221        "Subjective Comments    Subjective Comments  No complaints or changes.  -       Subjective Pain    Able to rate subjective pain?  yes  -    Pre-Treatment Pain Level  2  -    Post-Treatment Pain Level  3  -       Transfers    Sit-Stand [place] (Transfers)  independent  -    Stand-Sit [place] (Transfers)  independent  -    Comment (Transfers)  prone on stretcher for tx  -       Gait/Stairs Assessment/Training    [place] Level (Gait)  independent  -    Assistive Device (Gait)  cane, straight  -      User Key  (r) = Recorded By, (t) = Taken By, (c) = Cosigned By    Initials Name Provider Type    Diane Rod, PT Physical Therapist          [facility] Wound     Row Name 12/09/19 1415             Wound 12/05/19 1345 Right gluteal Pressure Injury    Wound - Properties Group Date first assessed: 12/05/19  - Time first assessed: 1345  - Side: Right  - Location: gluteal  - Primary Wound Type: Pressure inj  - Stage, Pressure Injury: Stage 2  -    Dressing Appearance  intact;moist drainage  -      Base  red;granulating;moist no slough, moist/red after debridement  -      Periwound  intact;dry;redness;excoriated  -      Periwound Temperature  warm  -      Periwound Skin Turgor  soft  -      Edges  open  -      Drainage Characteristics/Odor  serosanguineous  -      Drainage Amount  scant  -      Care, Wound  cleansed with;wound cleanser;debrided;ultrasound therapy, non contact low frequency;honey applied MIST 5 minutes  -      Dressing Care, Wound  dressing applied;collagen;low-adherent;foam;border dressing aubrie Ag, 4\" optifoam  -      Periwound Care, Wound  cleansed with pH balanced cleanser;dry periwound area maintained  -        User Key  (r) = Recorded By, (t) = Taken By, (c) = Cosigned By    Initials Name Provider Type    Diane Rod PT Physical Therapist    Sheryl Bowman, PT Physical Therapist            WOUND DEBRIDEMENT  Total area of " Debridement: 1 cm2  Debridement Site 1  Location- Site 1: R buttock  Selective Debridement- Site 1: Wound Surface <20cmsq  Instruments- Site 1: tweezers  Excised Tissue Description- Site 1: maximum, slough, other (comment)(biofilm layer)  Bleeding- Site 1: scant, held pressure, 1 minute             Therapy Education     Row Name 12/09/19 1414             Therapy Education    Education Details  Continue with current POC.  -MC      Given  Symptoms/condition management;Bandaging/dressing change  -      Program  Reinforced  -MC      How Provided  Verbal;Demonstration  -MC      Provided to  Patient;Other (comment) spouse  -      Level of Understanding  Verbalized  -        User Key  (r) = Recorded By, (t) = Taken By, (c) = Cosigned By    Initials Name Provider Type    Diane Rod PT Physical Therapist          Recommendation and Plan  PT Assessment/Plan     Row Name 12/09/19 7346          PT Assessment    Functional Limitations  Performance in self-care ADL;Other (comment) wound mgmt  -     Impairments  Integumentary integrity;Pain  -     Assessment Comments  PT able to debride entire biofilm/slough layer over wound bed today, revealing a moist, pink/red base. Pt will continue to benefit from skilled PT wound care to promote healing to this chronic wound.  -     Rehab Potential  Good  -     Patient/caregiver participated in establishment of treatment plan and goals  Yes  -     Patient would benefit from skilled therapy intervention  Yes  -MC        PT Plan    PT Frequency  3x/week  -     Physical Therapy Interventions (Optional Details)  patient/family education;wound care  -     PT Plan Comments  MIST, debridement, aubrie  -       User Key  (r) = Recorded By, (t) = Taken By, (c) = Cosigned By    Initials Name Provider Type    Diane Rod PT Physical Therapist          Goals  PT OP Goals     Row Name 12/09/19 1411          Time Calculation    PT Goal Re-Cert Due Date  03/04/20   -       User Key  (r) = Recorded By, (t) = Taken By, (c) = Cosigned By    Initials Name Provider Type    Diane Rod, PT Physical Therapist          PT Goal Re-Cert Due Date: 03/04/20            Time Calculation: Start Time: 1415  Therapy Charges for Today     Code Description Service Date Service Provider Modifiers Qty    [id number] HC PT NLFU MIST 12/9/2019 Diane De La Cruz, PT GP 1    [id number]  MARIYA DEBRIDE OPEN WOUND UP TO 20CM 12/9/2019 Diane De La Cruz, PT GP 1                  Diane De La Cruz, PT  12/9/2019

## 2019-12-11 ENCOUNTER — APPOINTMENT (OUTPATIENT)
Dept: PHYSICAL THERAPY | Facility: HOSPITAL | Age: 74
End: 2019-12-11

## 2019-12-13 ENCOUNTER — HOSPITAL ENCOUNTER (OUTPATIENT)
Dept: PHYSICAL THERAPY | Facility: HOSPITAL | Age: 74
Setting detail: THERAPIES SERIES
Discharge: HOME | End: 2019-12-13

## 2019-12-13 DIAGNOSIS — L89.312 PRESSURE INJURY OF RIGHT BUTTOCK, STAGE 2 (HCC): Primary | ICD-10-CM

## 2019-12-13 PROCEDURE — 97610 LOW FREQUENCY NON-THERMAL US: CPT

## 2019-12-13 NOTE — THERAPY WOUND CARE TREATMENT
Outpatient Rehabilitation - Wound/Debridement Treatment Note   [place]     Patient Name: Antione Douglas  : 1945  MRN: [id number]  Today's Date: 2019                 Admit Date: 2019    Visit Dx:    ICD-10-CM ICD-9-CM   1. Pressure injury of right buttock, stage 2 (CMS/HCC) L89.312 707.05     707.22       Patient Active Problem List   Diagnosis   • Atopic rhinitis   • Chronic obstructive pulmonary disease (CMS/HCC)   • Type 2 diabetes mellitus without complication, without long-term current use of insulin (CMS/HCC)   • Generalized anxiety disorder   • Essential hypertension   • Primary insomnia   • Arthralgia of hip   • Chronic bilateral low back pain   • Cobalamin deficiency   • Asymptomatic PVCs   • Chronic idiopathic constipation   • Chronic kidney disease, stage 3 (CMS/HCC)   • Diabetic peripheral neuropathy (CMS/HCC)   • Benign prostatic hyperplasia with urinary hesitancy   • Leukocytosis   • Peripheral vascular disease of foot (CMS/HCC)   • Mixed hyperlipidemia   • DDD (degenerative disc disease), cervical   • History of fusion of cervical spine        Past Medical History:   Diagnosis Date   • Allergic rhinitis    • Anxiety    • Chronic neck pain    • COPD (chronic obstructive pulmonary disease) (CMS/HCC)    • Depression    • Diabetes mellitus (CMS/HCC)    • Duodenal adenoma    • Low back pain    • Sleep apnea     intolerant to CPAP        Past Surgical History:   Procedure Laterality Date   • BACK SURGERY  2009    Left minimally invasive L4-5 far lateral lumbar discectomy Dr. Levi Mcgregor ([facility])   • CERVICAL FUSION      C5-6, C6-7 ACDF (unknown date, unknown surgeon)   • EYE SURGERY     • INTERVENTIONAL RADIOLOGY PROCEDURE N/A 2019    Procedure: IR myelogram cervical spine;  Surgeon: Vern Cunningham MD;  Location:  [facility] LOC[facility];  Service: Interventional Radiology   • SINUS SURGERY           EVALUATION  PT Ortho     Row Name 19 5201        "Subjective Comments    Subjective Comments  No complaints. Reports the area feels better.  -       Subjective Pain    Able to rate subjective pain?  yes  -    Pre-Treatment Pain Level  1  -MC    Post-Treatment Pain Level  1  -       Transfers    Sit-Stand [place] (Transfers)  independent  -MC    Stand-Sit [place] (Transfers)  independent  -MC    Comment (Transfers)  prone on stretcher for tx  -       Gait/Stairs Assessment/Training    [place] Level (Gait)  independent  -    Assistive Device (Gait)  cane, straight  -      User Key  (r) = Recorded By, (t) = Taken By, (c) = Cosigned By    Initials Name Provider Type     Diane De La Cruz PT Physical Therapist          [facility] Wound     Row Name 12/13/19 1430             Wound 12/05/19 1345 Right gluteal Pressure Injury    Wound - Properties Group Date first assessed: 12/05/19  - Time first assessed: 1345  -JM Side: Right  - Location: gluteal  - Primary Wound Type: Pressure inj  - Stage, Pressure Injury: Stage 2  -    Wound Image  Images linked: 1  -      Dressing Appearance  intact;moist drainage  -      Base  red;granulating;moist;epithelialization pinpoint open area remaining  -      Periwound  intact;dry;redness;excoriated  -      Periwound Temperature  warm  -      Periwound Skin Turgor  soft  -      Edges  open  -      Wound Length (cm)  0.1 cm  -      Wound Width (cm)  0.1 cm  -      Wound Depth (cm)  0.1 cm  -      Drainage Characteristics/Odor  serosanguineous  -      Drainage Amount  scant  -      Care, Wound  cleansed with;wound cleanser;ultrasound therapy, non contact low frequency MIST 5 minutes  -      Dressing Care, Wound  dressing applied;low-adherent;foam;border dressing 4\" optifoam gentle border  -      Periwound Care, Wound  cleansed with pH balanced cleanser;dry periwound area maintained  -        User Key  (r) = Recorded By, (t) = Taken By, (c) = Cosigned By    Initials Name Provider " Type    Diane Rod, PT Physical Therapist    Sheryl Bowman, PT Physical Therapist            WOUND DEBRIDEMENT                   Therapy Education     Row Name 12/13/19 1430             Therapy Education    Education Details  Keep dressing dry/intact until next treatment. If area closed, discussed treatment options, including keeping the area covered x1-2 weeks to allow for increased skin integrity. Also discussed potential use of scar ointments/oils to promote scar maturation and skin integrity.  -MC      Given  Symptoms/condition management;Bandaging/dressing change  -      Program  Modified;Reinforced  -MC      How Provided  Verbal;Demonstration  -MC      Provided to  Patient;Other (comment) spouse  -MC      Level of Understanding  Verbalized  -        User Key  (r) = Recorded By, (t) = Taken By, (c) = Cosigned By    Initials Name Provider Type    Diane Rod, PT Physical Therapist          Recommendation and Plan  PT Assessment/Plan     Row Name 12/13/19 1430          PT Assessment    Functional Limitations  Performance in self-care ADL;Other (comment) wound mgmt  -     Impairments  Integumentary integrity;Pain  -MC     Assessment Comments  Pt with notable improvement, with only a pinpoint, red/granulated area open today. The remainder of the wound is epithelialized. No debridement needed today. The wound will likely be closed by next session, but the pt wants to be assessed by PT d/t chronicity of the wound.  -     Rehab Potential  Good  -     Patient/caregiver participated in establishment of treatment plan and goals  Yes  -     Patient would benefit from skilled therapy intervention  Yes  -MC        PT Plan    Physical Therapy Interventions (Optional Details)  patient/family education;wound care  -     PT Plan Comments  MIST, debridement, aubrie  -       User Key  (r) = Recorded By, (t) = Taken By, (c) = Cosigned By    Initials Name Provider Type    GARCIA De La Cruz  Diane TRAN, PT Physical Therapist          Goals  PT OP Goals     Row Name 12/13/19 1430          Time Calculation    PT Goal Re-Cert Due Date  03/04/20  -       User Key  (r) = Recorded By, (t) = Taken By, (c) = Cosigned By    Initials Name Provider Type     Diane De La Cruz, PT Physical Therapist          PT Goal Re-Cert Due Date: 03/04/20            Time Calculation: Start Time: 1430  Therapy Charges for Today     Code Description Service Date Service Provider Modifiers Qty    [id number] HC PT NLFU MIST 12/13/2019 Diane De La Cruz, PT GP 1                  Diane De La Cruz, PT  12/13/2019

## 2019-12-16 ENCOUNTER — HOSPITAL ENCOUNTER (OUTPATIENT)
Dept: PHYSICAL THERAPY | Facility: HOSPITAL | Age: 74
Setting detail: THERAPIES SERIES
Discharge: HOME | End: 2019-12-16

## 2019-12-16 DIAGNOSIS — L89.312 PRESSURE INJURY OF RIGHT BUTTOCK, STAGE 2 (HCC): Primary | ICD-10-CM

## 2019-12-16 PROCEDURE — 97597 DBRDMT OPN WND 1ST 20 CM/<: CPT

## 2019-12-16 NOTE — THERAPY WOUND CARE TREATMENT
Outpatient Rehabilitation - Wound/Debridement Treatment Note   Dionna     Patient Name: Antione Douglas  : 1945  MRN: [id number]  Today's Date: 2019                 Admit Date: 2019    Visit Dx:    ICD-10-CM ICD-9-CM   1. Pressure injury of right buttock, stage 2 (CMS/HCC) L89.312 707.05     707.22       Patient Active Problem List   Diagnosis   • Atopic rhinitis   • Chronic obstructive pulmonary disease (CMS/HCC)   • Type 2 diabetes mellitus without complication, without long-term current use of insulin (CMS/HCC)   • Generalized anxiety disorder   • Essential hypertension   • Primary insomnia   • Arthralgia of hip   • Chronic bilateral low back pain   • Cobalamin deficiency   • Asymptomatic PVCs   • Chronic idiopathic constipation   • Chronic kidney disease, stage 3 (CMS/HCC)   • Diabetic peripheral neuropathy (CMS/HCC)   • Benign prostatic hyperplasia with urinary hesitancy   • Leukocytosis   • Peripheral vascular disease of foot (CMS/HCC)   • Mixed hyperlipidemia   • DDD (degenerative disc disease), cervical   • History of fusion of cervical spine        Past Medical History:   Diagnosis Date   • Allergic rhinitis    • Anxiety    • Chronic neck pain    • COPD (chronic obstructive pulmonary disease) (CMS/HCC)    • Depression    • Diabetes mellitus (CMS/HCC)    • Duodenal adenoma    • Low back pain    • Sleep apnea     intolerant to CPAP        Past Surgical History:   Procedure Laterality Date   • BACK SURGERY  2009    Left minimally invasive L4-5 far lateral lumbar discectomy Dr. Levi Mcgregor ([facility])   • CERVICAL FUSION      C5-6, C6-7 ACDF (unknown date, unknown surgeon)   • EYE SURGERY     • INTERVENTIONAL RADIOLOGY PROCEDURE N/A 2019    Procedure: IR myelogram cervical spine;  Surgeon: Vern Cunningham MD;  Location:  [facility];  Service: Interventional Radiology   • SINUS SURGERY           EVALUATION  PT Ortho     Row Name 19 1100        "Subjective Comments    Subjective Comments  No complaints or changes since last treatment  -       Subjective Pain    Able to rate subjective pain?  yes  -    Pre-Treatment Pain Level  1  -    Post-Treatment Pain Level  1  -       Transfers    Sit-Stand Gordo (Transfers)  independent  -    Stand-Sit Gordo (Transfers)  independent  -    Comment (Transfers)  prone on stretcher for tx  -       Gait/Stairs Assessment/Training    Gordo Level (Gait)  independent  -    Assistive Device (Gait)  cane, straight  -      User Key  (r) = Recorded By, (t) = Taken By, (c) = Cosigned By    Initials Name Provider Type    Diane Rod, PT Physical Therapist          GIOVANY Wound     Row Name 12/16/19 1100             Wound 12/05/19 1345 Right gluteal Pressure Injury    Wound - Properties Group Date first assessed: 12/05/19  - Time first assessed: 1345  - Side: Right  - Location: gluteal  - Primary Wound Type: Pressure inj  - Stage, Pressure Injury: Stage 2  -    Dressing Appearance  intact;moist drainage  -      Base  epithelialization;closed/resurfaced min hypertrophic crust remaining, appears closed  -      Periwound  intact;dry;redness;excoriated  -      Periwound Temperature  warm  -      Periwound Skin Turgor  soft  -      Drainage Amount  none  -      Care, Wound  cleansed with;wound cleanser;debrided  -      Dressing Care, Wound  dressing applied;low-adherent;foam;border dressing 4\" optifoam gentle  -      Periwound Care, Wound  cleansed with pH balanced cleanser;dry periwound area maintained  -        User Key  (r) = Recorded By, (t) = Taken By, (c) = Cosigned By    Initials Name Provider Type    Diane Rod PT Physical Therapist    Sheryl Bowman, PT Physical Therapist            WOUND DEBRIDEMENT  Total area of Debridement: 1 cm2  Debridement Site 1  Location- Site 1: R buttock  Selective Debridement- Site 1: Wound Surface " <20cmsq  Instruments- Site 1: tweezers, scissors  Excised Tissue Description- Site 1: moderate, other (comment)(hypertrophic crust over newly closed skin)  Bleeding- Site 1: none             Therapy Education     Row Name 12/16/19 1100             Therapy Education    Education Details  Tentative d/c today. Change optifoam every 2-3 days for 1-2 additional weeks to allow skin to mature. After that timeframe, no dressings needed, but py may consider a scar management ointment to attempt to improve skin integrity. Continue use of doughnut cushion x1-2 weeks, then may try sitting without it. Resume use if any tenderness or redness noted.   -      Given  Symptoms/condition management;Bandaging/dressing change  -      Program  Progressed;Modified  -      How Provided  Verbal;Demonstration  -      Provided to  Patient;Other (comment) spouse  -      Level of Understanding  Verbalized  -        User Key  (r) = Recorded By, (t) = Taken By, (c) = Cosigned By    Initials Name Provider Type    Diane Rod, PT Physical Therapist          Recommendation and Plan  PT Assessment/Plan     Row Name 12/16/19 1100          PT Assessment    Functional Limitations  Performance in self-care ADL;Other (comment) wound mgmt  -     Impairments  Integumentary integrity;Pain  -     Assessment Comments  Pt with no open areas today, and only minimal hypertrophic crust present after debridement. Discussed ongoing skin protection and treatment options. Pt will be tentatively d/c today.  -     Rehab Potential  Good  -     Patient/caregiver participated in establishment of treatment plan and goals  Yes  -MC        PT Plan    PT Frequency  Other (comment) prn within 30 days  -     Physical Therapy Interventions (Optional Details)  patient/family education;wound care  -     PT Plan Comments  tentative d/c  -       User Key  (r) = Recorded By, (t) = Taken By, (c) = Cosigned By    Initials Name Provider Type      Diane De La Cruz, PT Physical Therapist          Goals  PT OP Goals     Row Name 12/16/19 1100          Time Calculation    PT Goal Re-Cert Due Date  03/04/20  -       User Key  (r) = Recorded By, (t) = Taken By, (c) = Cosigned By    Initials Name Provider Type     Diane De La Cruz, PT Physical Therapist          PT Goal Re-Cert Due Date: 03/04/20            Time Calculation: Start Time: 1100  Therapy Charges for Today     Code Description Service Date Service Provider Modifiers Qty    [id number] HC MARIYA DEBRIDE OPEN WOUND UP TO 20CM 12/16/2019 Diane De La Cruz, PT GP 1                  Diane De La Cruz, PT  12/16/2019

## 2019-12-18 ENCOUNTER — APPOINTMENT (OUTPATIENT)
Dept: PHYSICAL THERAPY | Facility: HOSPITAL | Age: 74
End: 2019-12-18

## 2019-12-18 DIAGNOSIS — F41.1 GENERALIZED ANXIETY DISORDER: ICD-10-CM

## 2019-12-18 RX ORDER — ALPRAZOLAM 1 MG/1
TABLET ORAL
Qty: 90 TABLET | Refills: 1 | Status: SHIPPED | OUTPATIENT
Start: 2019-12-18 | End: 2020-03-09

## 2019-12-20 ENCOUNTER — APPOINTMENT (OUTPATIENT)
Dept: PHYSICAL THERAPY | Facility: HOSPITAL | Age: 74
End: 2019-12-20

## 2019-12-23 ENCOUNTER — APPOINTMENT (OUTPATIENT)
Dept: PHYSICAL THERAPY | Facility: HOSPITAL | Age: 74
End: 2019-12-23

## 2019-12-26 ENCOUNTER — APPOINTMENT (OUTPATIENT)
Dept: PHYSICAL THERAPY | Facility: HOSPITAL | Age: 74
End: 2019-12-26

## 2020-01-02 ENCOUNTER — TELEPHONE (OUTPATIENT)
Dept: FAMILY MEDICINE CLINIC | Facility: CLINIC | Age: 75
End: 2020-01-02

## 2020-01-02 DIAGNOSIS — M25.512 CHRONIC PAIN OF BOTH SHOULDERS: ICD-10-CM

## 2020-01-02 DIAGNOSIS — M51.36 DDD (DEGENERATIVE DISC DISEASE), LUMBAR: ICD-10-CM

## 2020-01-02 DIAGNOSIS — M54.2 CHRONIC NECK PAIN: Primary | ICD-10-CM

## 2020-01-02 DIAGNOSIS — G89.29 CHRONIC PAIN OF BOTH SHOULDERS: ICD-10-CM

## 2020-01-02 DIAGNOSIS — M25.511 CHRONIC PAIN OF BOTH SHOULDERS: ICD-10-CM

## 2020-01-02 DIAGNOSIS — G89.29 CHRONIC NECK PAIN: Primary | ICD-10-CM

## 2020-01-02 NOTE — TELEPHONE ENCOUNTER
NATASHA       NEW ORDER NEEDED FOR PHYSICAL THERAPY.    ASHLEY CALLED IN WANTING TO KNOW IF A NEW ORDER CAN BE PLACED FOR PT FOR HIS NECK, SHOULDER AND BACK.       [facility] PHYSICAL THERAPY       CALL BACK   [phone]

## 2020-01-14 ENCOUNTER — DOCUMENTATION (OUTPATIENT)
Dept: PHYSICAL THERAPY | Facility: HOSPITAL | Age: 75
End: 2020-01-14

## 2020-01-14 NOTE — THERAPY DISCHARGE NOTE
Outpatient Rehabilitation - Wound/Debridement D/C Summary        Patient Name: Antione Douglas  : 1945  MRN: [id number]  Today's Date: 2020                  Admit Date: (Not on file)    Visit Dx:  No diagnosis found.    Patient Active Problem List   Diagnosis   • Atopic rhinitis   • Chronic obstructive pulmonary disease (CMS/HCC)   • Type 2 diabetes mellitus without complication, without long-term current use of insulin (CMS/HCC)   • Generalized anxiety disorder   • Essential hypertension   • Primary insomnia   • Arthralgia of hip   • Chronic bilateral low back pain   • Cobalamin deficiency   • Asymptomatic PVCs   • Chronic idiopathic constipation   • Chronic kidney disease, stage 3 (CMS/HCC)   • Diabetic peripheral neuropathy (CMS/HCC)   • Benign prostatic hyperplasia with urinary hesitancy   • Leukocytosis   • Peripheral vascular disease of foot (CMS/HCC)   • Mixed hyperlipidemia   • DDD (degenerative disc disease), cervical   • History of fusion of cervical spine        Past Medical History:   Diagnosis Date   • Allergic rhinitis    • Anxiety    • Chronic neck pain    • COPD (chronic obstructive pulmonary disease) (CMS/[facility])    • Depression    • Diabetes mellitus (CMS/[facility])    • Duodenal adenoma    • Low back pain    • Sleep apnea     intolerant to CPAP        Past Surgical History:   Procedure Laterality Date   • BACK SURGERY  2009    Left minimally invasive L4-5 far lateral lumbar discectomy Dr. Levi Mcgregor ([facility])   • CERVICAL FUSION      C5-6, C6-7 ACDF (unknown date, unknown surgeon)   • EYE SURGERY     • INTERVENTIONAL RADIOLOGY PROCEDURE N/A 2019    Procedure: IR myelogram cervical spine;  Surgeon: Vern Cunningham MD;  Location: [facility] INVASIVE LOCATION;  Service: Interventional Radiology   • SINUS SURGERY           EVALUATION                WOUND DEBRIDEMENT                            Recommendation and Plan      Goals  PT OP Goals     Row Name 20 0954           PT Short Term Goals    STG 1  Patient and/ or caregiver able to verbalize signs and symptoms of infection.  -MC     STG 1 Progress  Met  -MC     STG 2  Decrease wound dimensions by 50% as evidence of wound closure.  -MC     STG 2 Progress  Met  -        Long Term Goals    LTG 1  Patient and/ or caregiver independent with clean dressing changes.  -MC     LTG 1 Progress  Met  -MC     LTG 2  Decrease wound dimensions by 75% as evidence of wound closure.  -MC     LTG 2 Progress  Met  -       User Key  (r) = Recorded By, (t) = Taken By, (c) = Cosigned By    Initials Name Provider Type    Diane Rod, PT Physical Therapist          Time Calculation:              OP Discharge Summary     Row Name 01/14/20 0954             OP PT Discharge Summary    Date of Discharge  01/14/20  -MC      Reason for Discharge  All goals achieved  -      Outcomes Achieved  Able to achieve all goals within established timeline  -        User Key  (r) = Recorded By, (t) = Taken By, (c) = Cosigned By    Initials Name Provider Type    Diane Rod, PT Physical Therapist          Diane De La Cruz, PT  1/14/2020

## 2020-01-21 ENCOUNTER — OFFICE VISIT (OUTPATIENT)
Dept: FAMILY MEDICINE CLINIC | Facility: CLINIC | Age: 75
End: 2020-01-21

## 2020-01-21 VITALS
DIASTOLIC BLOOD PRESSURE: 74 MMHG | RESPIRATION RATE: 18 BRPM | BODY MASS INDEX: 27.16 KG/M2 | SYSTOLIC BLOOD PRESSURE: 128 MMHG | HEIGHT: 69 IN | TEMPERATURE: 97.3 F | WEIGHT: 183.4 LBS | OXYGEN SATURATION: 98 % | HEART RATE: 101 BPM

## 2020-01-21 DIAGNOSIS — J01.00 ACUTE NON-RECURRENT MAXILLARY SINUSITIS: Primary | ICD-10-CM

## 2020-01-21 PROCEDURE — 99213 OFFICE O/P EST LOW 20 MIN: CPT | Performed by: NURSE PRACTITIONER

## 2020-01-21 RX ORDER — AMOXICILLIN AND CLAVULANATE POTASSIUM 875; 125 MG/1; MG/1
1 TABLET, FILM COATED ORAL 2 TIMES DAILY
Qty: 20 TABLET | Refills: 0 | Status: SHIPPED | OUTPATIENT
Start: 2020-01-21 | End: 2020-02-03

## 2020-01-21 NOTE — PROGRESS NOTES
Subjective   Antione Douglas is a 74 y.o. male.     History of Present Illness   Sinus congestion, sneezing, PND yellow sputum coughing up for the past week  Thinks he has a sinus infection  Taking OTC Sudafed and nose spray, daily Loratadine with no help  No fever or chills    The following portions of the patient's history were reviewed and updated as appropriate: allergies, current medications, past family history, past medical history, past social history, past surgical history and problem list.    Review of Systems   Constitutional: Negative for activity change, appetite change, chills, fatigue and fever.   HENT: Positive for congestion, postnasal drip, rhinorrhea and sinus pressure. Negative for ear pain, sneezing, sore throat, swollen glands, trouble swallowing and voice change.    Eyes: Negative for redness and itching.   Respiratory: Positive for cough. Negative for chest tightness, shortness of breath and wheezing.    Cardiovascular: Negative.  Negative for chest pain.   Gastrointestinal: Negative.  Negative for abdominal pain, nausea and vomiting.   Endocrine: Negative.    Genitourinary: Negative.    Musculoskeletal: Negative.  Negative for arthralgias, myalgias, neck pain and neck stiffness.   Skin: Negative.  Negative for rash.   Allergic/Immunologic: Negative.  Negative for environmental allergies.   Neurological: Negative for dizziness, weakness, light-headedness and headache.   Hematological: Negative for adenopathy.   Psychiatric/Behavioral: Negative.  Negative for sleep disturbance.       Objective   Physical Exam   Constitutional: He is oriented to person, place, and time. He appears well-developed and well-nourished. No distress.   HENT:   Head: Normocephalic.   Right Ear: Tympanic membrane, external ear and ear canal normal.   Left Ear: Tympanic membrane, external ear and ear canal normal.   Nose: Mucosal edema, rhinorrhea and sinus tenderness present. Right sinus exhibits maxillary sinus  tenderness and frontal sinus tenderness. Left sinus exhibits maxillary sinus tenderness and frontal sinus tenderness.   Mouth/Throat: Uvula is midline, oropharynx is clear and moist and mucous membranes are normal. No oropharyngeal exudate, posterior oropharyngeal edema, posterior oropharyngeal erythema or tonsillar abscesses.   Eyes: Conjunctivae are normal.   Neck: Neck supple.   Cardiovascular: Normal rate, regular rhythm and normal heart sounds.   Pulmonary/Chest: Effort normal and breath sounds normal. No respiratory distress. He has no wheezes.   Lymphadenopathy:        Head (right side): No tonsillar, no preauricular, no posterior auricular and no occipital adenopathy present.        Head (left side): No tonsillar, no preauricular, no posterior auricular and no occipital adenopathy present.     He has no cervical adenopathy.   Neurological: He is alert and oriented to person, place, and time.   Skin: Skin is warm and dry. He is not diaphoretic.   Psychiatric: He has a normal mood and affect. His behavior is normal. Judgment and thought content normal.   Nursing note and vitals reviewed.        Assessment/Plan   Antione was seen today for cough and nasal congestion.    Diagnoses and all orders for this visit:    Acute non-recurrent maxillary sinusitis  -     amoxicillin-clavulanate (AUGMENTIN) 875-125 MG per tablet; Take 1 tablet by mouth 2 (Two) Times a Day.      Continue allergy medication  Will treat with antibiotics take until gone   F/U if not improving

## 2020-02-02 NOTE — PROGRESS NOTES
Subjective   Antione Douglas is a 74 y.o. male.   Chief Complaint   Patient presents with   • Follow-up     NOTE: pt is fasting for labs / RF: Furosemide, Atorvastatin, Duloxetine    • Diabetes     has not had samples of Januvia for the past several months    • Hyperlipidemia   • Hypertension   • Chronic Kidney Disease   • Anxiety   • B12 Def   • Leukocytosis   • chronic neck pain   • DDD spine     still having low back pain that moves down both legs      History of Present Illness     Patient returns today for follow-up of chronic medical conditions as noted above.      I last saw Antione in October of last year.  At that time his kidney function was a little better and his diabetes continue to improve.  His hemoglobin A1c was down to 6.2%.  Slow improvement from 6.8% about a year and a half previous.    His CBC was normal as was his B12 level.    Over the last several weeks, Antione has generally been feeling pretty good. Bothered mostly by low back pain.    Was seen on 1/21/2024 for an acute sinusitis and started on Augmentin. Resolved at this time.    Of note, he followed up with Dr. Khan back in late November for his chronic right shoulder pain.  He had been given a subacromial injection on 9/10/2019.  He was also sent to physical therapy.  He reported to Dr. Khan that he was much better.  His note to me stated that he recommended over-the-counter anti-inflammatories for pain and/or swelling.  I did discuss this with Antione and told him I would urge him not to do this given his renal problems.  Tylenol is okay and diclofenac gel is okay.    Back pain - primarily at night sleeping it seems to be worse.  He last had imaging done in June 2018.  MRI at that time showed significant DJD changes of the lumbar spine.  Progression since 2014.  Levels of canal and foraminal stenosis also noted.  Saw Dr. Mcgregor around that time for evaluation who felt like he was best treated with nonsurgical approaches.  Antione wonders if  getting some kind of injection is possible like he did his shoulders that seem to improve things.    It has been 13 months since his last drink of alcohol.    The following portions of the patient's history were reviewed and updated as appropriate: allergies, current medications, past medical history, past social history and problem list.    Review of Systems   Constitutional: Negative for fever and unexpected weight loss.   HENT: Negative.    Eyes: Negative for blurred vision.   Respiratory: Negative.    Cardiovascular: Negative.  Negative for chest pain and leg swelling.   Gastrointestinal: Negative.    Endocrine: Negative for polyuria.   Musculoskeletal: Positive for arthralgias (shoulder improved), back pain and neck pain.   Skin: Negative.    Neurological: Positive for numbness (bilateral feet). Negative for headache.   Psychiatric/Behavioral: Nervous/anxious: stable.        Objective   Physical Exam   Constitutional: He appears well-developed and well-nourished. No distress.   Eyes: No scleral icterus.   Neck: No JVD present. Carotid bruit is not present.   Cardiovascular: Normal rate, regular rhythm and normal heart sounds.   Pulmonary/Chest: Effort normal and breath sounds normal. He has no wheezes.   Musculoskeletal: He exhibits no edema.   Continues to use 4-prong cane for assistance and safety reasons; last spine with poor range of motion hardly any flexion beyond about 20 to 25 degrees.    Antione had a diabetic foot exam performed today.  Neurological: He is alert. A sensory deficit (Unchanged in stocking distribution ) is present.   Skin: Skin is warm and dry.   Psychiatric: He has a normal mood and affect. Thought content normal.   Nursing note and vitals reviewed.        Assessment/Plan   Antione was seen today for follow-up, diabetes, hyperlipidemia, hypertension, chronic kidney disease, anxiety, b12 def, leukocytosis, chronic neck pain and ddd spine.    Diagnoses and all orders for this  visit:    Essential hypertension  Comments:  Adequate control-no changes.  Orders:  -     Comprehensive Metabolic Panel  -     furosemide (LASIX) 20 MG tablet; 2 po q am and one po q pm    Mixed hyperlipidemia  -     Lipid Panel With LDL / HDL Ratio    Chronic bilateral low back pain without sciatica    Type 2 diabetes mellitus without complication, without long-term current use of insulin (CMS/[facility])  Comments:  Has essentially been off of his Januvia.  We gave him samples today but if his A1c is less than 7 would not recommend he restart  Orders:  -     Hemoglobin A1c    Generalized anxiety disorder  Comments:  Continue with his alprazolam as before-stable    Hyperlipidemia, unspecified hyperlipidemia type  -     atorvastatin (LIPITOR) 10 MG tablet; Take 1 tablet by mouth Daily.    DDD (degenerative disc disease), lumbar  Comments:  Will refer for injection consult  Orders:  -     Ambulatory Referral to Pain Management    Panlobular emphysema (CMS/[facility])  Comments:  2 samples of Spiriva given    Other orders  -     DULoxetine (CYMBALTA) 60 MG capsule; Take 1 capsule by mouth Daily.    Labs as noted above.  See continued improvement/even resolution of his diabetes with time.  There are of the need to avoid anti-inflammatories given his renal function.    Continue therapy and follow-up with Dr. Khan for his shoulder issues as well as Dr. Mcgregor with any problems with his neck.    We will refer him to pain management with a copy of his MRI to evaluate for possible injection therapy to aid with his low back pain.  Do not think he has reason to reimage at this time.    No change in his current medications recommended today.  Reminder of eye exam need.             Vahe Coronel MD  02/03/2020

## 2020-02-03 ENCOUNTER — OFFICE VISIT (OUTPATIENT)
Dept: FAMILY MEDICINE CLINIC | Facility: CLINIC | Age: 75
End: 2020-02-03

## 2020-02-03 VITALS
DIASTOLIC BLOOD PRESSURE: 65 MMHG | WEIGHT: 180 LBS | BODY MASS INDEX: 26.66 KG/M2 | HEART RATE: 88 BPM | RESPIRATION RATE: 16 BRPM | TEMPERATURE: 97.7 F | SYSTOLIC BLOOD PRESSURE: 130 MMHG | HEIGHT: 69 IN

## 2020-02-03 DIAGNOSIS — E11.9 TYPE 2 DIABETES MELLITUS WITHOUT COMPLICATION, WITHOUT LONG-TERM CURRENT USE OF INSULIN (HCC): ICD-10-CM

## 2020-02-03 DIAGNOSIS — E78.2 MIXED HYPERLIPIDEMIA: ICD-10-CM

## 2020-02-03 DIAGNOSIS — I10 ESSENTIAL HYPERTENSION: Primary | ICD-10-CM

## 2020-02-03 DIAGNOSIS — F41.1 GENERALIZED ANXIETY DISORDER: ICD-10-CM

## 2020-02-03 DIAGNOSIS — G89.29 CHRONIC BILATERAL LOW BACK PAIN WITHOUT SCIATICA: ICD-10-CM

## 2020-02-03 DIAGNOSIS — M54.50 CHRONIC BILATERAL LOW BACK PAIN WITHOUT SCIATICA: ICD-10-CM

## 2020-02-03 DIAGNOSIS — E78.5 HYPERLIPIDEMIA, UNSPECIFIED HYPERLIPIDEMIA TYPE: ICD-10-CM

## 2020-02-03 DIAGNOSIS — J43.1 PANLOBULAR EMPHYSEMA (HCC): ICD-10-CM

## 2020-02-03 DIAGNOSIS — M51.36 DDD (DEGENERATIVE DISC DISEASE), LUMBAR: ICD-10-CM

## 2020-02-03 PROCEDURE — 99214 OFFICE O/P EST MOD 30 MIN: CPT | Performed by: FAMILY MEDICINE

## 2020-02-03 RX ORDER — FUROSEMIDE 20 MG/1
TABLET ORAL
Qty: 270 TABLET | Refills: 1 | Status: SHIPPED | OUTPATIENT
Start: 2020-02-03 | End: 2021-02-04

## 2020-02-03 RX ORDER — ATORVASTATIN CALCIUM 10 MG/1
10 TABLET, FILM COATED ORAL DAILY
Qty: 90 TABLET | Refills: 3 | Status: SHIPPED | OUTPATIENT
Start: 2020-02-03 | End: 2021-03-14

## 2020-02-03 RX ORDER — DULOXETIN HYDROCHLORIDE 60 MG/1
60 CAPSULE, DELAYED RELEASE ORAL DAILY
Qty: 90 CAPSULE | Refills: 3 | Status: SHIPPED | OUTPATIENT
Start: 2020-02-03 | End: 2021-02-22

## 2020-02-04 LAB
ALBUMIN SERPL-MCNC: 4.4 G/DL (ref 3.7–4.7)
ALBUMIN/GLOB SERPL: 1.5 {RATIO} (ref 1.2–2.2)
ALP SERPL-CCNC: 116 IU/L (ref 39–117)
ALT SERPL-CCNC: 20 IU/L (ref 0–44)
AST SERPL-CCNC: 19 IU/L (ref 0–40)
BILIRUB SERPL-MCNC: 0.3 MG/DL (ref 0–1.2)
BUN SERPL-MCNC: 17 MG/DL (ref 8–27)
BUN/CREAT SERPL: 13 (ref 10–24)
CALCIUM SERPL-MCNC: 9.6 MG/DL (ref 8.6–10.2)
CHLORIDE SERPL-SCNC: 100 MMOL/L (ref 96–106)
CHOLEST SERPL-MCNC: 100 MG/DL (ref 100–199)
CO2 SERPL-SCNC: 24 MMOL/L (ref 20–29)
CREAT SERPL-MCNC: 1.29 MG/DL (ref 0.76–1.27)
GLOBULIN SER CALC-MCNC: 3 G/DL (ref 1.5–4.5)
GLUCOSE SERPL-MCNC: 94 MG/DL (ref 65–99)
HBA1C MFR BLD: 6.2 % (ref 4.8–5.6)
HDLC SERPL-MCNC: 41 MG/DL
LDLC SERPL CALC-MCNC: 46 MG/DL (ref 0–99)
LDLC/HDLC SERPL: 1.1 RATIO (ref 0–3.6)
POTASSIUM SERPL-SCNC: 4.5 MMOL/L (ref 3.5–5.2)
PROT SERPL-MCNC: 7.4 G/DL (ref 6–8.5)
SODIUM SERPL-SCNC: 142 MMOL/L (ref 134–144)
TRIGL SERPL-MCNC: 65 MG/DL (ref 0–149)
VLDLC SERPL CALC-MCNC: 13 MG/DL (ref 5–40)

## 2020-02-05 ENCOUNTER — TELEPHONE (OUTPATIENT)
Dept: FAMILY MEDICINE CLINIC | Facility: CLINIC | Age: 75
End: 2020-02-05

## 2020-02-12 ENCOUNTER — OFFICE VISIT (OUTPATIENT)
Dept: FAMILY MEDICINE CLINIC | Facility: CLINIC | Age: 75
End: 2020-02-12

## 2020-02-12 VITALS — TEMPERATURE: 97.2 F | WEIGHT: 178.4 LBS | RESPIRATION RATE: 16 BRPM | HEIGHT: 69 IN | BODY MASS INDEX: 26.42 KG/M2

## 2020-02-12 DIAGNOSIS — F51.01 PRIMARY INSOMNIA: Primary | ICD-10-CM

## 2020-02-12 PROCEDURE — 99213 OFFICE O/P EST LOW 20 MIN: CPT | Performed by: NURSE PRACTITIONER

## 2020-02-12 RX ORDER — TRAZODONE HYDROCHLORIDE 50 MG/1
50-100 TABLET ORAL NIGHTLY
Qty: 180 TABLET | Refills: 1 | Status: SHIPPED | OUTPATIENT
Start: 2020-02-12 | End: 2021-02-10

## 2020-02-12 RX ORDER — OXCARBAZEPINE 300 MG/1
300 TABLET, FILM COATED ORAL DAILY
COMMUNITY
End: 2020-04-02

## 2020-02-12 NOTE — PROGRESS NOTES
Subjective   Antione Douglas is a 74 y.o. male.     History of Present Illness   Having trouble sleeping   Wants to resume Trazodone if possible  His back is bothering him and he can only sleep about 4-5 hours each night   He takes Xanax TID for his mood  He has been to pain management for his back pain     The following portions of the patient's history were reviewed and updated as appropriate: allergies, current medications, past family history, past medical history, past social history, past surgical history and problem list.    Review of Systems   Musculoskeletal: Positive for back pain.   Psychiatric/Behavioral: Positive for sleep disturbance.       Objective   Physical Exam   Constitutional: He is oriented to person, place, and time. He appears well-developed and well-nourished.   Cardiovascular: Normal rate, regular rhythm and normal heart sounds.   Pulmonary/Chest: Effort normal and breath sounds normal.   Neurological: He is alert and oriented to person, place, and time.   Skin: Capillary refill takes less than 2 seconds.   Psychiatric: He has a normal mood and affect. His behavior is normal. Judgment and thought content normal.   Nursing note and vitals reviewed.        Assessment/Plan   Antione was seen today for insomnia.    Diagnoses and all orders for this visit:    Primary insomnia  -     traZODone (DESYREL) 50 MG tablet; Take 1-2 tablets by mouth Every Night.    will start pt on Trazodone since he has taken this in the past, did discuss with pt that he is at increase risk of medication interaction so stop this medication if he has any dizziness, excessive sedation or off balance like he might fall  Pt agrees.

## 2020-02-28 DIAGNOSIS — Z00.8 PRE-SURGICAL PSYCHOLOGICAL ASSESSMENT, ENCOUNTER FOR: Primary | ICD-10-CM

## 2020-03-09 DIAGNOSIS — F41.1 GENERALIZED ANXIETY DISORDER: ICD-10-CM

## 2020-03-09 RX ORDER — ALPRAZOLAM 1 MG/1
TABLET ORAL
Qty: 90 TABLET | Refills: 2 | Status: SHIPPED | OUTPATIENT
Start: 2020-03-09 | End: 2020-07-23 | Stop reason: SDUPTHER

## 2020-03-30 RX ORDER — OMEPRAZOLE 20 MG/1
CAPSULE, DELAYED RELEASE ORAL
Qty: 90 CAPSULE | Refills: 0 | Status: SHIPPED | OUTPATIENT
Start: 2020-03-30 | End: 2020-07-27

## 2020-03-31 RX ORDER — OXCARBAZEPINE 300 MG/1
TABLET, FILM COATED ORAL
Qty: 180 TABLET | Refills: 0 | OUTPATIENT
Start: 2020-03-31

## 2020-04-01 RX ORDER — OXCARBAZEPINE 300 MG/1
300 TABLET, FILM COATED ORAL DAILY
Status: CANCELLED | OUTPATIENT
Start: 2020-04-01

## 2020-04-01 NOTE — TELEPHONE ENCOUNTER
Patient is calling requesting a refill on his oxcarbazepine but stated that Anna Kohli would not approve the refill and wants to know why and if Julianna Harmon would send it in. Please advise and call back    Confirmed pharmacy

## 2020-04-02 NOTE — TELEPHONE ENCOUNTER
Check with Antione, please - If this is helping his neuropathy, we can continue. If he does not think helping, I'd prefer stopping.Thanks.

## 2020-05-21 DIAGNOSIS — J30.2 SEASONAL ALLERGIES: ICD-10-CM

## 2020-05-21 RX ORDER — LORATADINE 10 MG/1
TABLET ORAL
Qty: 30 TABLET | Refills: 5 | Status: SHIPPED | OUTPATIENT
Start: 2020-05-21 | End: 2020-11-10

## 2020-06-09 DIAGNOSIS — J01.00 ACUTE NON-RECURRENT MAXILLARY SINUSITIS: ICD-10-CM

## 2020-06-09 RX ORDER — AMOXICILLIN AND CLAVULANATE POTASSIUM 875; 125 MG/1; MG/1
TABLET, FILM COATED ORAL
Qty: 20 TABLET | Refills: 0 | OUTPATIENT
Start: 2020-06-09

## 2020-07-05 PROBLEM — D72.829 LEUKOCYTOSIS: Status: RESOLVED | Noted: 2018-05-08 | Resolved: 2020-07-05

## 2020-07-06 NOTE — PROGRESS NOTES
Subjective   Antione Douglas is a 75 y.o. male.   Chief Complaint   Patient presents with   • Follow-up     RF: Xanax    • Prediabetes   • Hyperlipidemia   • Hypertension   • Chronic Kidney Disease   • B12 def   • Leukocytosis   • Anxiety   • chronic neck & back pain     pt not requesting pain medication but say's it feels muscular   • Heartburn   • check lesion on back of L ear     using Mometasone topical cream    • Insomnia     pt has been taking Trazodone and Xanax at the same time for this      History of Present Illness     Patient returns today for follow-up of chronic medical conditions as noted above.       At his last visit with me in early February his kidney function was actually better.  It was still an issue but improving slowly over the last year or so.  Liver function test was normal.  His A1c was stable at 6.2% I did not think he was required to take the Januvia at that point.  We will plan to recheck today.     In general over the last several weeks, Antione has been feeling pretty good overall.  His moods have been stable.  Continues to use Xanax as before.    Rash behind his left ear.  Been a couple of years and it does not appear to heal.  Mometasone has not helped.     We referred him back to Dr. Portillo for possible intervention for his back pain.  Dr. Portillo ordered presurgical psychological assessment prior to seeing him for possible intrathecal pump or implantable stimulator evaluation.     Last diabetic eye exam was last year.  Due now.    The following portions of the patient's history were reviewed and updated as appropriate: allergies, current medications, past medical history, past social history and problem list.    Review of Systems   Constitutional: Negative for fever and unexpected weight loss.   HENT: Negative.    Eyes: Negative for visual disturbance.   Respiratory: Negative.    Cardiovascular: Negative.  Negative for chest pain and palpitations.   Gastrointestinal: Negative.     Endocrine: Negative for polyuria.   Musculoskeletal: Positive for arthralgias (shoulder improved), back pain and neck pain.   Skin: Positive for rash.   Neurological: Positive for numbness (bilateral feet). Negative for headache.   Psychiatric/Behavioral: Nervous/anxious: stable.        Objective   Physical Exam   Constitutional: He appears well-developed and well-nourished. No distress.   Eyes: No scleral icterus.   Neck: Neck supple. Carotid bruit is not present.   Cardiovascular: Normal rate, regular rhythm and normal heart sounds.   Pulmonary/Chest: Effort normal and breath sounds normal. He has no wheezes.   Musculoskeletal: He exhibits no edema.   Walks with an antalgic gait slightly using a cane    Antione had a diabetic foot exam performed today.  Lymphadenopathy:     He has no cervical adenopathy.   Neurological: He is alert. A sensory deficit (Unchanged in stocking distribution ) is present.   Skin: Skin is warm and dry.   Near her macular rash behind his left ear.  3 cm in greatest length   Psychiatric: He has a normal mood and affect. His behavior is normal.   Nursing note and vitals reviewed.        Assessment/Plan   Antione was seen today for follow-up, prediabetes, hyperlipidemia, hypertension, chronic kidney disease, b12 def, leukocytosis, anxiety, chronic neck & back pain, heartburn, check lesion on back of l ear and insomnia.    Diagnoses and all orders for this visit:    Type 2 diabetes mellitus without complication, without long-term current use of insulin (CMS/[facility])  Comments:  Recheck today-a little concerned about the weight gain of 16 pounds.  Orders:  -     Hemoglobin A1c    Essential hypertension  -     Comprehensive Metabolic Panel    Chronic kidney disease, stage 3 (CMS/[facility])  Comments:  Will upgrade his diagnosis if we get continued improvement/stability  Orders:  -     Comprehensive Metabolic Panel    Generalized anxiety disorder  -     ALPRAZolam (XANAX) 1 MG tablet; One po tid prn  anxiety    Mixed hyperlipidemia  Comments:  Lipids were to goal in February-we will plan to recheck in 6 months    Chronic bilateral low back pain without sciatica  Comments:  Add some Skelaxin as needed for neck and back tightness.  Orders:  -     metaxalone (SKELAXIN) 800 MG tablet; Take 1 tablet by mouth 3 (Three) Times a Day As Needed for Muscle Spasms.    Skin lesion of left ear  -     Ambulatory Referral to Dermatology             Vahe Coronel MD  07/23/2020

## 2020-07-23 ENCOUNTER — OFFICE VISIT (OUTPATIENT)
Dept: FAMILY MEDICINE CLINIC | Facility: CLINIC | Age: 75
End: 2020-07-23

## 2020-07-23 VITALS
HEIGHT: 69 IN | SYSTOLIC BLOOD PRESSURE: 120 MMHG | RESPIRATION RATE: 20 BRPM | HEART RATE: 88 BPM | BODY MASS INDEX: 28.81 KG/M2 | TEMPERATURE: 97.7 F | DIASTOLIC BLOOD PRESSURE: 80 MMHG | WEIGHT: 194.5 LBS

## 2020-07-23 DIAGNOSIS — M54.50 CHRONIC BILATERAL LOW BACK PAIN WITHOUT SCIATICA: ICD-10-CM

## 2020-07-23 DIAGNOSIS — E11.9 TYPE 2 DIABETES MELLITUS WITHOUT COMPLICATION, WITHOUT LONG-TERM CURRENT USE OF INSULIN (HCC): Primary | ICD-10-CM

## 2020-07-23 DIAGNOSIS — H61.92 SKIN LESION OF LEFT EAR: ICD-10-CM

## 2020-07-23 DIAGNOSIS — N18.30 CHRONIC KIDNEY DISEASE, STAGE 3 (HCC): ICD-10-CM

## 2020-07-23 DIAGNOSIS — E78.2 MIXED HYPERLIPIDEMIA: ICD-10-CM

## 2020-07-23 DIAGNOSIS — I10 ESSENTIAL HYPERTENSION: ICD-10-CM

## 2020-07-23 DIAGNOSIS — G89.29 CHRONIC BILATERAL LOW BACK PAIN WITHOUT SCIATICA: ICD-10-CM

## 2020-07-23 DIAGNOSIS — F41.1 GENERALIZED ANXIETY DISORDER: ICD-10-CM

## 2020-07-23 PROCEDURE — 99214 OFFICE O/P EST MOD 30 MIN: CPT | Performed by: FAMILY MEDICINE

## 2020-07-23 RX ORDER — ALPRAZOLAM 1 MG/1
TABLET ORAL
Qty: 90 TABLET | Refills: 2 | Status: SHIPPED | OUTPATIENT
Start: 2020-07-23 | End: 2021-02-22

## 2020-07-23 RX ORDER — METAXALONE 800 MG/1
800 TABLET ORAL 3 TIMES DAILY PRN
Qty: 60 TABLET | Refills: 1 | Status: SHIPPED | OUTPATIENT
Start: 2020-07-23 | End: 2020-11-02

## 2020-07-24 LAB
ALBUMIN SERPL-MCNC: 4.2 G/DL (ref 3.7–4.7)
ALBUMIN/GLOB SERPL: 1.4 {RATIO} (ref 1.2–2.2)
ALP SERPL-CCNC: 86 IU/L (ref 39–117)
ALT SERPL-CCNC: 38 IU/L (ref 0–44)
AST SERPL-CCNC: 38 IU/L (ref 0–40)
BILIRUB SERPL-MCNC: 0.3 MG/DL (ref 0–1.2)
BUN SERPL-MCNC: 21 MG/DL (ref 8–27)
BUN/CREAT SERPL: 14 (ref 10–24)
CALCIUM SERPL-MCNC: 9.3 MG/DL (ref 8.6–10.2)
CHLORIDE SERPL-SCNC: 95 MMOL/L (ref 96–106)
CO2 SERPL-SCNC: 25 MMOL/L (ref 20–29)
CREAT SERPL-MCNC: 1.54 MG/DL (ref 0.76–1.27)
GLOBULIN SER CALC-MCNC: 3 G/DL (ref 1.5–4.5)
GLUCOSE SERPL-MCNC: ABNORMAL MG/DL
HBA1C MFR BLD: 5.9 % (ref 4.8–5.6)
POTASSIUM SERPL-SCNC: ABNORMAL MMOL/L
PROT SERPL-MCNC: 7.2 G/DL (ref 6–8.5)
SODIUM SERPL-SCNC: 137 MMOL/L (ref 134–144)

## 2020-07-27 RX ORDER — OMEPRAZOLE 20 MG/1
CAPSULE, DELAYED RELEASE ORAL
Qty: 90 CAPSULE | Refills: 1 | Status: SHIPPED | OUTPATIENT
Start: 2020-07-27 | End: 2021-02-10

## 2020-07-29 RX ORDER — CALCIUM POLYCARBOPHIL 625 MG 625 MG/1
625 TABLET ORAL DAILY
Qty: 30 TABLET | Refills: 0 | COMMUNITY
Start: 2020-07-29 | End: 2021-02-10

## 2020-10-08 ENCOUNTER — OFFICE VISIT (OUTPATIENT)
Dept: FAMILY MEDICINE CLINIC | Facility: CLINIC | Age: 75
End: 2020-10-08

## 2020-10-08 VITALS
TEMPERATURE: 98 F | RESPIRATION RATE: 18 BRPM | WEIGHT: 198 LBS | SYSTOLIC BLOOD PRESSURE: 110 MMHG | DIASTOLIC BLOOD PRESSURE: 78 MMHG | HEART RATE: 70 BPM | BODY MASS INDEX: 29.33 KG/M2 | HEIGHT: 69 IN

## 2020-10-08 DIAGNOSIS — M54.50 CHRONIC BILATERAL LOW BACK PAIN WITHOUT SCIATICA: Primary | ICD-10-CM

## 2020-10-08 DIAGNOSIS — G89.29 CHRONIC BILATERAL LOW BACK PAIN WITHOUT SCIATICA: Primary | ICD-10-CM

## 2020-10-08 LAB
BILIRUB BLD-MCNC: NEGATIVE MG/DL
CLARITY, POC: CLEAR
COLOR UR: YELLOW
GLUCOSE UR STRIP-MCNC: NEGATIVE MG/DL
KETONES UR QL: NEGATIVE
LEUKOCYTE EST, POC: NEGATIVE
NITRITE UR-MCNC: NEGATIVE MG/ML
PH UR: 6 [PH] (ref 5–8)
PROT UR STRIP-MCNC: NEGATIVE MG/DL
RBC # UR STRIP: NEGATIVE /UL
SP GR UR: 1.02 (ref 1–1.03)
UROBILINOGEN UR QL: NORMAL

## 2020-10-08 PROCEDURE — 81003 URINALYSIS AUTO W/O SCOPE: CPT | Performed by: PHYSICIAN ASSISTANT

## 2020-10-08 PROCEDURE — 99213 OFFICE O/P EST LOW 20 MIN: CPT | Performed by: PHYSICIAN ASSISTANT

## 2020-10-08 RX ORDER — PREDNISONE 20 MG/1
20 TABLET ORAL 2 TIMES DAILY
Qty: 10 TABLET | Refills: 0 | Status: SHIPPED | OUTPATIENT
Start: 2020-10-08 | End: 2021-02-08

## 2020-10-08 RX ORDER — METHOCARBAMOL 500 MG/1
500 TABLET, FILM COATED ORAL 4 TIMES DAILY PRN
Qty: 45 TABLET | Refills: 0 | Status: CANCELLED | OUTPATIENT
Start: 2020-10-08

## 2020-10-08 NOTE — PROGRESS NOTES
Subjective   Antione Douglas is a 75 y.o. male.     History of Present Illness   Pt presents with CC of low back pain   This is a chronic issue   MRI in 2018 has shown significant degenerative changes with disc herniation to the right side with moderate narrowing of openings where the nerves exit the spine  He has been on oral pain management in the past as well as had injections. Notes that injections did seem to help.   Has been evaluated by Dr. Mcgregor in the past and referred to Dr. Colindres. Was not a surgical candidate at the time. Was resistant to get back in with pain management, but is willing to try again if it means he gets some symptom relief   Affects his quality of life. Not able to get out and do the things he would like   Skelaxin prescribed by PCP last visit, did seem to help with muscle spasms and tightness, however pain in his back stays constant   Pt is mindful to avoid NSAIDs with his decreased kidney function and has been taking tylenol with little relief   Ambulates with cane   Pt notes he has neuropathy     Notes he takes trazodone for sleep, been off and on for years. Wants to try stopping. Notes he has vivid dreams    The following portions of the patient's history were reviewed and updated as appropriate: allergies, current medications, past family history, past medical history, past social history, past surgical history and problem list.    Review of Systems   Constitutional: Negative.  Negative for chills, diaphoresis and fever.   HENT: Negative.  Negative for congestion, ear discharge, ear pain, hearing loss, nosebleeds, postnasal drip, sinus pressure, sneezing and sore throat.    Eyes: Negative.    Respiratory: Negative.  Negative for cough, chest tightness, shortness of breath and wheezing.    Cardiovascular: Negative.  Negative for chest pain, palpitations and leg swelling.   Gastrointestinal: Negative for abdominal distention, abdominal pain, blood in stool, constipation, diarrhea,  "nausea and vomiting.   Genitourinary: Negative.    Musculoskeletal: Positive for back pain and gait problem.   Skin: Negative.  Negative for color change, pallor, rash and wound.   Neurological: Positive for numbness.   Psychiatric/Behavioral: Positive for sleep disturbance.       Objective    Blood pressure 110/78, pulse 70, temperature 98 °F (36.7 °C), resp. rate 18, height 175.3 cm (69\"), weight 89.8 kg (198 lb).     Physical Exam  Constitutional:       Appearance: Normal appearance.   HENT:      Head: Normocephalic.      Right Ear: External ear normal.      Left Ear: External ear normal.      Nose: Nose normal.   Eyes:      Conjunctiva/sclera: Conjunctivae normal.   Cardiovascular:      Rate and Rhythm: Normal rate and regular rhythm.      Heart sounds: Normal heart sounds.   Pulmonary:      Effort: Pulmonary effort is normal.      Breath sounds: Normal breath sounds.   Musculoskeletal:      Lumbar back: He exhibits decreased range of motion, tenderness and bony tenderness.      Comments: Slow antalgic gait. Ambulates with cane    Skin:     General: Skin is warm and dry.   Neurological:      Mental Status: He is alert and oriented to person, place, and time.   Psychiatric:         Mood and Affect: Mood normal.         Behavior: Behavior normal.         Thought Content: Thought content normal.         Judgment: Judgment normal.         Assessment/Plan   Antione was seen today for back pain.    Diagnoses and all orders for this visit:    Chronic bilateral low back pain without sciatica  -     POCT urinalysis dipstick, automated  -     predniSONE (DELTASONE) 20 MG tablet; Take 1 tablet by mouth 2 (Two) Times a Day.  -     Ambulatory Referral to Orthopedic Surgery    unfortunately, patient's pain is affecting his quality of life. Pt is open to seeing Dr. Colindres again for additional evaluation. He may benefit from resuming injections. referral placed   Blood sugar is well controlled at this time. Will do short " steroid burst to help with inflammation and pain, although this will be just a temporary relief for his chronic pain   Due to decreased kidney function, he is unable to take NSAIDs at this time   May continue with muscle relaxer as prescribed by Dr. Coronel   Pt wanted urine checked and it was normal without evidence of infection   Pt may do a trial off of trazodone and see how his sleep does. I provided OTC samples of Rem Fresh extended release melatonin to try as well.

## 2020-11-02 DIAGNOSIS — M54.50 CHRONIC BILATERAL LOW BACK PAIN WITHOUT SCIATICA: ICD-10-CM

## 2020-11-02 DIAGNOSIS — G89.29 CHRONIC BILATERAL LOW BACK PAIN WITHOUT SCIATICA: ICD-10-CM

## 2020-11-02 RX ORDER — METAXALONE 800 MG/1
TABLET ORAL
Qty: 60 TABLET | Refills: 2 | Status: SHIPPED | OUTPATIENT
Start: 2020-11-02 | End: 2020-11-04

## 2020-11-04 ENCOUNTER — TELEPHONE (OUTPATIENT)
Dept: FAMILY MEDICINE CLINIC | Facility: CLINIC | Age: 75
End: 2020-11-04

## 2020-11-04 RX ORDER — TIZANIDINE 4 MG/1
4 TABLET ORAL EVERY 8 HOURS PRN
Qty: 60 TABLET | Refills: 1 | Status: SHIPPED | OUTPATIENT
Start: 2020-11-04 | End: 2021-02-10

## 2020-11-04 NOTE — TELEPHONE ENCOUNTER
Caller: Maggie Douglas    Relationship: Emergency Contact    Best call back number: [phone]    What medications are you currently taking:   Current Outpatient Medications on File Prior to Visit   Medication Sig Dispense Refill   • albuterol (PROVENTIL HFA;VENTOLIN HFA) 108 (90 BASE) MCG/ACT inhaler Inhale 2 puffs Every 6 (Six) Hours As Needed. ProAir  (90 Base) MCG/ACT Inhalation Aerosol Solution; Patient Sig: ProAir  (90 Base) MCG/ACT Inhalation Aerosol Solution ; 0; 08-Aug-2013; Active     • ALPRAZolam (XANAX) 1 MG tablet One po tid prn anxiety 90 tablet 2   • atorvastatin (LIPITOR) 10 MG tablet Take 1 tablet by mouth Daily. 90 tablet 3   • budesonide-formoterol (SYMBICORT) 160-4.5 MCG/ACT inhaler Inhale 2 puffs 2 (two) times a day.     • calcium polycarbophil (FiberCon) 625 MG tablet Take 1 tablet by mouth Daily. 30 tablet 0   • diclofenac (VOLTAREN) 1 % gel gel Apply 4 g topically to the appropriate area as directed 4 (Four) Times a Day As Needed (pain). 1 tube 0   • DULoxetine (CYMBALTA) 60 MG capsule Take 1 capsule by mouth Daily. 90 capsule 3   • furosemide (LASIX) 20 MG tablet 2 po q am and one po q pm 270 tablet 1   • glucose blood (BRODERICK CONTOUR TEST) test strip Test once daily  DX E11.8 100 each 1   • imipramine (TOFRANIL) 25 MG tablet Take 1 tablet by mouth Every Night. May use 2 qhs 180 tablet 2   • ipratropium (ATROVENT) 0.06 % nasal spray 2 sprays into each nostril 4 (Four) Times a Day. (Patient taking differently: 2 sprays into the nostril(s) as directed by provider 4 (Four) Times a Day As Needed.) 1 each 3   • lactulose (CEPHULAC) 10 g packet Take 1 packet by mouth Daily As Needed (constipation). 15 each 0   • loratadine (CLARITIN) 10 MG tablet TAKE 1 TABLET BY MOUTH EVERY DAY 30 tablet 5   • metaxalone (SKELAXIN) 800 MG tablet TAKE ONE TABLET BY MOUTH THREE TIMES A DAY AS NEEDED FOR MUSCLE SPASMS 60 tablet 2   • mometasone (ELOCON) 0.1 % ointment      • omeprazole (priLOSEC) 20 MG  capsule TAKE ONE CAPSULE BY MOUTH DAILY 90 capsule 1   • predniSONE (DELTASONE) 20 MG tablet Take 1 tablet by mouth 2 (Two) Times a Day. 10 tablet 0   • traZODone (DESYREL) 50 MG tablet Take 1-2 tablets by mouth Every Night. 180 tablet 1   • Triamcinolone Acetonide (NASACORT) 55 MCG/ACT nasal inhaler 2 sprays into the nostril(s) as directed by provider Daily. 16.5 g 5     No current facility-administered medications on file prior to visit.         When did you start taking these medications:     Which medication are you concerned about: Muscle relaxer    Who prescribed you this medication: EVIE    What are your concerns: Pharmacy faxed over for something different because the price of the muscle relaxer sent was not cost effective.    How long have you been taking these medications:     How long have you had these concerns:

## 2020-11-06 RX ORDER — INFLUENZA A VIRUS A/MICHIGAN/45/2015 X-275 (H1N1) ANTIGEN (FORMALDEHYDE INACTIVATED), INFLUENZA A VIRUS A/SINGAPORE/INFIMH-16-0019/2016 IVR-186 (H3N2) ANTIGEN (FORMALDEHYDE INACTIVATED), INFLUENZA B VIRUS B/PHUKET/3073/2013 ANTIGEN (FORMALDEHYDE INACTIVATED), AND INFLUENZA B VIRUS B/MARYLAND/15/2016 BX-69A ANTIGEN (FORMALDEHYDE INACTIVATED) 60; 60; 60; 60 UG/.7ML; UG/.7ML; UG/.7ML; UG/.7ML
INJECTION, SUSPENSION INTRAMUSCULAR
COMMUNITY
Start: 2020-09-30 | End: 2021-02-10

## 2020-11-10 DIAGNOSIS — J30.2 SEASONAL ALLERGIES: ICD-10-CM

## 2020-11-10 RX ORDER — LORATADINE 10 MG/1
TABLET ORAL
Qty: 30 TABLET | Refills: 5 | Status: SHIPPED | OUTPATIENT
Start: 2020-11-10 | End: 2021-02-10

## 2021-02-04 DIAGNOSIS — I10 ESSENTIAL HYPERTENSION: ICD-10-CM

## 2021-02-04 RX ORDER — FUROSEMIDE 20 MG/1
TABLET ORAL
Qty: 90 TABLET | Refills: 0 | Status: SHIPPED | OUTPATIENT
Start: 2021-02-04 | End: 2021-03-05

## 2021-02-05 DIAGNOSIS — N18.30 STAGE 3 CHRONIC KIDNEY DISEASE, UNSPECIFIED WHETHER STAGE 3A OR 3B CKD (HCC): Primary | ICD-10-CM

## 2021-02-05 NOTE — TELEPHONE ENCOUNTER
Patient informed by reminder card. Did not see order in chart. Ordered BMP per transcribed by paper.

## 2021-02-06 NOTE — PROGRESS NOTES
"Chief Complaint  Follow-up (Pt forgot to bring medication list with him today to review. ), Diabetes, Hypertension, Hyperlipidemia, Chronic Kidney Disease, B12 def, Leukocytosis, Insomnia, Anxiety, Heartburn, and chronic low back pain (referred back to Dr. Colindres, pt did not go)    Subjective          Patient returns today for follow-up of chronic medical conditions as noted above.       I last saw Antione in July of last year.  At that time his blood sugars continue to improve and his A1c was down to 5.9%.  His liver function look great and his sodium and potassium were okay.  His kidney function was a little worse.  Reminded him to stay well-hydrated and avoid NSAIDs.    Due to recheck on his renal function as well as his lipids and blood sugars today.    He was seen in our office by Julianna puentes in early October with worsening low back pain.  He has been evaluated in the past by neurosurgery and orthopedics.  He was not a surgical candidate.  Tylenol was provided him little relief.  Ambulates with a cane.  Has diabetic neuropathy.    Decided not to see Dr. Colindres.    Saw Dr. Leblanc 9/20 - no evidence of DM complications    Gabapentin made him \"silly\" wife didn't want him to take;  Has had imipramine and trazodone.  Do not think he is currently taking although we were unaware that.    Muscle across his right abdomen - will spasm occ. Happens when he bends to that right side.  Better over the last couple weeks.    Did have his eye exam in the fall and had his flu vaccine. Got his COVID vaccine #1 last week.    Antione Douglas presents to [facility] for   History of Present Illness    Objective   Vital Signs:   /70   Pulse 72   Temp 97.7 °F (36.5 °C)   Resp 24   Ht 175.3 cm (69.02\")   Wt 95.9 kg (211 lb 8 oz)   BMI 31.22 kg/m²     Physical Exam  Vitals signs and nursing note reviewed.   Constitutional:       General: He is not in acute distress.     Appearance: " Normal appearance. He is well-developed.   HENT:      Head: Normocephalic.   Eyes:      General: No scleral icterus.  Neck:      Thyroid: No thyromegaly.      Vascular: No carotid bruit.   Cardiovascular:      Rate and Rhythm: Normal rate and regular rhythm.      Heart sounds: Normal heart sounds.   Pulmonary:      Effort: Pulmonary effort is normal.      Breath sounds: Normal breath sounds. No wheezing.   Musculoskeletal:      Right lower leg: No edema.      Left lower leg: No edema.   Lymphadenopathy:      Cervical: No cervical adenopathy.   Skin:     General: Skin is warm and dry.   Neurological:      General: No focal deficit present.      Mental Status: He is alert and oriented to person, place, and time.      Sensory: Sensory deficit (no real change in the decrease in sensation to mid calf) present.   Psychiatric:         Mood and Affect: Mood normal.         Thought Content: Thought content normal.        Result Review :                 Assessment and Plan    Diagnoses and all orders for this visit:    1. Type 2 diabetes mellitus without complication, without long-term current use of insulin (CMS/[facility]) (Primary)  Assessment & Plan:  Hope to see improvements / stability in his A1C.  I am little concerned given his weight gain.    Orders:  -     Comprehensive Metabolic Panel  -     Hemoglobin A1c  -     MicroAlbumin, Urine, Random - Urine, Clean Catch    2. Essential hypertension  -     Comprehensive Metabolic Panel    3. Mixed hyperlipidemia  -     Lipid Panel With LDL / HDL Ratio    4. Generalized anxiety disorder    5. Stage 3 chronic kidney disease, unspecified whether stage 3a or 3b CKD (CMS/HCC)  -     Comprehensive Metabolic Panel    6. Panlobular emphysema (CMS/HCC)  Assessment & Plan:  Some deconditioning this winter - hope to get him out and about with warmer weather. - consider pulmonary rehab in future      7. Diabetic peripheral neuropathy (CMS/HCC)    8. Primary insomnia  Assessment & Plan:  Some  of this may be due to peripheral neuropathy and pain keeping him up.  Would prefer to try something like Lyrica.  Has tried gabapentin and did not tolerate it well.  Would start slow.  A TCA would also be an option to help him both with sleep and his peripheral neuropathy.         Follow Up   Return in about 6 months (around 8/8/2021) for Recheck.  Patient was given instructions and counseling regarding his condition or for health maintenance advice. Please see specific information pulled into the AVS if appropriate.     Need to have a med reconciliation with his wife and be sure were clear on his current medications before we prescribe anything new.  Will make him follow-up for 6 months but likely will need to see him sooner based on lab results.

## 2021-02-08 ENCOUNTER — OFFICE VISIT (OUTPATIENT)
Dept: FAMILY MEDICINE CLINIC | Facility: CLINIC | Age: 76
End: 2021-02-08

## 2021-02-08 ENCOUNTER — TELEPHONE (OUTPATIENT)
Dept: FAMILY MEDICINE CLINIC | Facility: CLINIC | Age: 76
End: 2021-02-08

## 2021-02-08 VITALS
BODY MASS INDEX: 31.32 KG/M2 | SYSTOLIC BLOOD PRESSURE: 110 MMHG | DIASTOLIC BLOOD PRESSURE: 70 MMHG | HEIGHT: 69 IN | HEART RATE: 72 BPM | RESPIRATION RATE: 24 BRPM | TEMPERATURE: 97.7 F | WEIGHT: 211.5 LBS

## 2021-02-08 DIAGNOSIS — F51.01 PRIMARY INSOMNIA: ICD-10-CM

## 2021-02-08 DIAGNOSIS — E11.9 TYPE 2 DIABETES MELLITUS WITHOUT COMPLICATION, WITHOUT LONG-TERM CURRENT USE OF INSULIN (HCC): Primary | ICD-10-CM

## 2021-02-08 DIAGNOSIS — F41.1 GENERALIZED ANXIETY DISORDER: ICD-10-CM

## 2021-02-08 DIAGNOSIS — E11.42 DIABETIC PERIPHERAL NEUROPATHY (HCC): ICD-10-CM

## 2021-02-08 DIAGNOSIS — I10 ESSENTIAL HYPERTENSION: ICD-10-CM

## 2021-02-08 DIAGNOSIS — J43.1 PANLOBULAR EMPHYSEMA (HCC): ICD-10-CM

## 2021-02-08 DIAGNOSIS — E78.2 MIXED HYPERLIPIDEMIA: ICD-10-CM

## 2021-02-08 DIAGNOSIS — N18.30 STAGE 3 CHRONIC KIDNEY DISEASE, UNSPECIFIED WHETHER STAGE 3A OR 3B CKD (HCC): ICD-10-CM

## 2021-02-08 PROCEDURE — 99214 OFFICE O/P EST MOD 30 MIN: CPT | Performed by: FAMILY MEDICINE

## 2021-02-08 NOTE — ASSESSMENT & PLAN NOTE
Some of this may be due to peripheral neuropathy and pain keeping him up.  Would prefer to try something like Lyrica.  Has tried gabapentin and did not tolerate it well.  Would start slow.  A TCA would also be an option to help him both with sleep and his peripheral neuropathy.

## 2021-02-08 NOTE — TELEPHONE ENCOUNTER
Will you please reconcile his medication list and then I am going to send him in something new for sleep but I want to be able to run it through medication warning checks.

## 2021-02-08 NOTE — ASSESSMENT & PLAN NOTE
Some deconditioning this winter - hope to get him out and about with warmer weather. - consider pulmonary rehab in future

## 2021-02-08 NOTE — TELEPHONE ENCOUNTER
PATIENT'S WIFE STATES THE PATIENT FORGOT TO BRING A LIST OF MEDICATIONS TO HAVE REFILLED   SHE CALLED TO GIVE THE INFORMATION TO THE CLINICAL  furosemide (LASIX) 20 MG tablet    loratadine (CLARITIN) 10 MG tablet    DULoxetine (CYMBALTA) 60 MG capsule    atorvastatin (LIPITOR) 10 MG tablet    ALPRAZolam (XANAX) 1 MG tablet      PATIENT CALL BACK [phone]

## 2021-02-09 LAB
ALBUMIN SERPL-MCNC: 3.9 G/DL (ref 3.7–4.7)
ALBUMIN/GLOB SERPL: 1.3 {RATIO} (ref 1.2–2.2)
ALP SERPL-CCNC: 86 IU/L (ref 39–117)
ALT SERPL-CCNC: 29 IU/L (ref 0–44)
AST SERPL-CCNC: 24 IU/L (ref 0–40)
BILIRUB SERPL-MCNC: 0.3 MG/DL (ref 0–1.2)
BUN SERPL-MCNC: 21 MG/DL (ref 8–27)
BUN/CREAT SERPL: 14 (ref 10–24)
CALCIUM SERPL-MCNC: 9.4 MG/DL (ref 8.6–10.2)
CHLORIDE SERPL-SCNC: 100 MMOL/L (ref 96–106)
CHOLEST SERPL-MCNC: 125 MG/DL (ref 100–199)
CO2 SERPL-SCNC: 24 MMOL/L (ref 20–29)
CREAT SERPL-MCNC: 1.55 MG/DL (ref 0.76–1.27)
GLOBULIN SER CALC-MCNC: 3.1 G/DL (ref 1.5–4.5)
GLUCOSE SERPL-MCNC: 143 MG/DL (ref 65–99)
HBA1C MFR BLD: 7.1 % (ref 4.8–5.6)
HDLC SERPL-MCNC: 39 MG/DL
LDLC SERPL CALC-MCNC: 70 MG/DL (ref 0–99)
LDLC/HDLC SERPL: 1.8 RATIO (ref 0–3.6)
MICROALBUMIN UR-MCNC: 29.8 UG/ML
POTASSIUM SERPL-SCNC: 4.4 MMOL/L (ref 3.5–5.2)
PROT SERPL-MCNC: 7 G/DL (ref 6–8.5)
SODIUM SERPL-SCNC: 140 MMOL/L (ref 134–144)
TRIGL SERPL-MCNC: 83 MG/DL (ref 0–149)
VLDLC SERPL CALC-MCNC: 16 MG/DL (ref 5–40)

## 2021-02-10 ENCOUNTER — TELEPHONE (OUTPATIENT)
Dept: FAMILY MEDICINE CLINIC | Facility: CLINIC | Age: 76
End: 2021-02-10

## 2021-02-10 RX ORDER — TRAZODONE HYDROCHLORIDE 100 MG/1
100 TABLET ORAL NIGHTLY PRN
Qty: 30 TABLET | Refills: 3 | Status: SHIPPED | OUTPATIENT
Start: 2021-02-10 | End: 2022-04-08

## 2021-02-10 NOTE — TELEPHONE ENCOUNTER
I just added Januvia following his last visit due to his worsening blood sugars.  I sent in some trazodone for him to try for sleep.  Try to go to bed at the same time each night.  Limit any caffeine.

## 2021-02-10 NOTE — TELEPHONE ENCOUNTER
I updated however, it looks like Januvia was just filled yesterday and Maggie failed to mention that.

## 2021-02-10 NOTE — TELEPHONE ENCOUNTER
Patient would for rian to call him back to discuss his test results from his labs drawn on Monday.    Patient: [phone]

## 2021-02-22 ENCOUNTER — LAB (OUTPATIENT)
Dept: FAMILY MEDICINE CLINIC | Facility: CLINIC | Age: 76
End: 2021-02-22

## 2021-02-22 DIAGNOSIS — F41.1 GENERALIZED ANXIETY DISORDER: ICD-10-CM

## 2021-02-22 DIAGNOSIS — N18.30 STAGE 3 CHRONIC KIDNEY DISEASE, UNSPECIFIED WHETHER STAGE 3A OR 3B CKD (HCC): Primary | ICD-10-CM

## 2021-02-22 RX ORDER — ALPRAZOLAM 0.5 MG/1
TABLET ORAL
Qty: 180 TABLET | Refills: 1 | Status: SHIPPED | OUTPATIENT
Start: 2021-02-22 | End: 2021-06-03

## 2021-02-22 RX ORDER — DULOXETIN HYDROCHLORIDE 60 MG/1
CAPSULE, DELAYED RELEASE ORAL
Qty: 90 CAPSULE | Refills: 2 | Status: SHIPPED | OUTPATIENT
Start: 2021-02-22 | End: 2021-06-24 | Stop reason: SDUPTHER

## 2021-02-23 LAB
BUN SERPL-MCNC: 27 MG/DL (ref 8–27)
BUN/CREAT SERPL: 17 (ref 10–24)
CALCIUM SERPL-MCNC: 9.9 MG/DL (ref 8.6–10.2)
CHLORIDE SERPL-SCNC: 99 MMOL/L (ref 96–106)
CO2 SERPL-SCNC: 24 MMOL/L (ref 20–29)
CREAT SERPL-MCNC: 1.58 MG/DL (ref 0.76–1.27)
GLUCOSE SERPL-MCNC: 139 MG/DL (ref 65–99)
POTASSIUM SERPL-SCNC: 4.7 MMOL/L (ref 3.5–5.2)
SODIUM SERPL-SCNC: 142 MMOL/L (ref 134–144)

## 2021-03-05 DIAGNOSIS — I10 ESSENTIAL HYPERTENSION: ICD-10-CM

## 2021-03-05 RX ORDER — FUROSEMIDE 20 MG/1
TABLET ORAL
Qty: 90 TABLET | Refills: 0 | Status: SHIPPED | OUTPATIENT
Start: 2021-03-05 | End: 2021-04-21 | Stop reason: SDUPTHER

## 2021-03-13 DIAGNOSIS — E78.5 HYPERLIPIDEMIA, UNSPECIFIED HYPERLIPIDEMIA TYPE: ICD-10-CM

## 2021-03-14 RX ORDER — ATORVASTATIN CALCIUM 10 MG/1
TABLET, FILM COATED ORAL
Qty: 90 TABLET | Refills: 3 | Status: SHIPPED | OUTPATIENT
Start: 2021-03-14 | End: 2021-06-24 | Stop reason: SDUPTHER

## 2021-04-21 DIAGNOSIS — I10 ESSENTIAL HYPERTENSION: ICD-10-CM

## 2021-04-21 RX ORDER — FUROSEMIDE 20 MG/1
TABLET ORAL
Qty: 90 TABLET | Refills: 0 | Status: SHIPPED | OUTPATIENT
Start: 2021-04-21 | End: 2021-06-07

## 2021-04-23 RX ORDER — OMEPRAZOLE 20 MG/1
CAPSULE, DELAYED RELEASE ORAL
Qty: 90 CAPSULE | Refills: 1 | Status: SHIPPED | OUTPATIENT
Start: 2021-04-23 | End: 2021-06-24 | Stop reason: SDUPTHER

## 2021-05-28 DIAGNOSIS — J30.2 SEASONAL ALLERGIES: ICD-10-CM

## 2021-05-28 RX ORDER — LORATADINE 10 MG/1
TABLET ORAL
Qty: 30 TABLET | Refills: 5 | OUTPATIENT
Start: 2021-05-28

## 2021-06-02 DIAGNOSIS — F41.1 GENERALIZED ANXIETY DISORDER: ICD-10-CM

## 2021-06-02 DIAGNOSIS — J30.2 SEASONAL ALLERGIES: ICD-10-CM

## 2021-06-03 RX ORDER — ALPRAZOLAM 0.5 MG/1
TABLET ORAL
Qty: 180 TABLET | Refills: 0 | Status: SHIPPED | OUTPATIENT
Start: 2021-06-03 | End: 2021-06-27 | Stop reason: SDUPTHER

## 2021-06-07 DIAGNOSIS — I10 ESSENTIAL HYPERTENSION: ICD-10-CM

## 2021-06-07 RX ORDER — FUROSEMIDE 20 MG/1
TABLET ORAL
Qty: 90 TABLET | Refills: 0 | Status: SHIPPED | OUTPATIENT
Start: 2021-06-07 | End: 2021-06-24 | Stop reason: SDUPTHER

## 2021-06-09 RX ORDER — LORATADINE 10 MG/1
TABLET ORAL
Qty: 30 TABLET | Refills: 5 | OUTPATIENT
Start: 2021-06-09

## 2021-06-14 ENCOUNTER — TELEPHONE (OUTPATIENT)
Dept: FAMILY MEDICINE CLINIC | Facility: CLINIC | Age: 76
End: 2021-06-14

## 2021-06-14 NOTE — TELEPHONE ENCOUNTER
Caller: Maggie Douglas    Relationship: Emergency Contact    Best call back number: [phone]     Additional notes: PATIENT'S WIFE STATES THE PATIENT IS NEEDING ADDITIONAL JANUVIA SAMPLES. PATIENT'S WIFE STATES KALYAN IS USUALLY THE PERSON WHO BRINGS THE SAMPLES TO THE FRONT FOR THE PATIENT. PATIENT'S WIFE IS REQUESTING A CALL WHEN THE SAMPLES ARE READY FOR PICKUP.

## 2021-06-18 ENCOUNTER — TELEPHONE (OUTPATIENT)
Dept: FAMILY MEDICINE CLINIC | Facility: CLINIC | Age: 76
End: 2021-06-18

## 2021-06-18 RX ORDER — LORATADINE 10 MG/1
10 TABLET ORAL DAILY
COMMUNITY
Start: 2021-05-02 | End: 2021-06-18 | Stop reason: SDUPTHER

## 2021-06-18 RX ORDER — LORATADINE 10 MG/1
10 TABLET ORAL DAILY
Qty: 90 TABLET | Refills: 1 | Status: SHIPPED | OUTPATIENT
Start: 2021-06-18 | End: 2021-06-24 | Stop reason: SDUPTHER

## 2021-06-18 NOTE — TELEPHONE ENCOUNTER
PATIENT NEEDS REFILL ON LORATADINE 10 MG QD    PHARMACY:  [facility]    PATIENT OUT OF MEDICATION

## 2021-06-24 ENCOUNTER — OFFICE VISIT (OUTPATIENT)
Dept: FAMILY MEDICINE CLINIC | Facility: CLINIC | Age: 76
End: 2021-06-24

## 2021-06-24 VITALS
HEIGHT: 69 IN | WEIGHT: 206.6 LBS | RESPIRATION RATE: 16 BRPM | HEART RATE: 92 BPM | DIASTOLIC BLOOD PRESSURE: 74 MMHG | SYSTOLIC BLOOD PRESSURE: 110 MMHG | BODY MASS INDEX: 30.6 KG/M2 | TEMPERATURE: 96.9 F | OXYGEN SATURATION: 97 %

## 2021-06-24 DIAGNOSIS — E78.5 HYPERLIPIDEMIA, UNSPECIFIED HYPERLIPIDEMIA TYPE: ICD-10-CM

## 2021-06-24 DIAGNOSIS — E11.9 TYPE 2 DIABETES MELLITUS WITHOUT COMPLICATION, WITHOUT LONG-TERM CURRENT USE OF INSULIN (HCC): Primary | ICD-10-CM

## 2021-06-24 DIAGNOSIS — I10 ESSENTIAL HYPERTENSION: ICD-10-CM

## 2021-06-24 DIAGNOSIS — J30.2 SEASONAL ALLERGIES: ICD-10-CM

## 2021-06-24 DIAGNOSIS — K21.9 GASTROESOPHAGEAL REFLUX DISEASE, UNSPECIFIED WHETHER ESOPHAGITIS PRESENT: ICD-10-CM

## 2021-06-24 DIAGNOSIS — F41.9 ANXIETY: ICD-10-CM

## 2021-06-24 LAB — HBA1C MFR BLD: 7 %

## 2021-06-24 PROCEDURE — 83036 HEMOGLOBIN GLYCOSYLATED A1C: CPT | Performed by: PHYSICIAN ASSISTANT

## 2021-06-24 PROCEDURE — 99213 OFFICE O/P EST LOW 20 MIN: CPT | Performed by: PHYSICIAN ASSISTANT

## 2021-06-24 PROCEDURE — 3051F HG A1C>EQUAL 7.0%<8.0%: CPT | Performed by: PHYSICIAN ASSISTANT

## 2021-06-24 RX ORDER — FUROSEMIDE 20 MG/1
TABLET ORAL
Qty: 270 TABLET | Refills: 1 | Status: SHIPPED | OUTPATIENT
Start: 2021-06-24 | End: 2021-11-18

## 2021-06-24 RX ORDER — OMEPRAZOLE 20 MG/1
20 CAPSULE, DELAYED RELEASE ORAL DAILY
Qty: 90 CAPSULE | Refills: 1 | Status: SHIPPED | OUTPATIENT
Start: 2021-06-24 | End: 2021-11-24

## 2021-06-24 RX ORDER — LORATADINE 10 MG/1
10 TABLET ORAL DAILY
Qty: 90 TABLET | Refills: 1 | Status: SHIPPED | OUTPATIENT
Start: 2021-06-24 | End: 2021-12-27 | Stop reason: SDUPTHER

## 2021-06-24 RX ORDER — DULOXETIN HYDROCHLORIDE 60 MG/1
60 CAPSULE, DELAYED RELEASE ORAL DAILY
Qty: 90 CAPSULE | Refills: 1 | Status: SHIPPED | OUTPATIENT
Start: 2021-06-24 | End: 2021-11-18

## 2021-06-24 RX ORDER — ATORVASTATIN CALCIUM 10 MG/1
10 TABLET, FILM COATED ORAL DAILY
Qty: 90 TABLET | Refills: 1 | Status: SHIPPED | OUTPATIENT
Start: 2021-06-24 | End: 2021-12-27 | Stop reason: SDUPTHER

## 2021-06-25 ENCOUNTER — TELEPHONE (OUTPATIENT)
Dept: FAMILY MEDICINE CLINIC | Facility: CLINIC | Age: 76
End: 2021-06-25

## 2021-06-25 DIAGNOSIS — F41.1 GENERALIZED ANXIETY DISORDER: ICD-10-CM

## 2021-06-25 NOTE — TELEPHONE ENCOUNTER
Pt has now changed to [facility] and requesting you to send in his Xanax 1mg to them.  Julianna already sent noncontrolled meds for him. Pt has a 3 month fu sched w/ Dr. Gómez.

## 2021-06-27 RX ORDER — ALPRAZOLAM 0.5 MG/1
TABLET ORAL
Qty: 180 TABLET | Refills: 1 | Status: SHIPPED | OUTPATIENT
Start: 2021-06-27 | End: 2021-09-16 | Stop reason: SDUPTHER

## 2021-06-28 NOTE — PROGRESS NOTES
"Subjective   Antione Douglas is a 76 y.o. male.     History of Present Illness   Pt presents for follow up for DM.   PCP wanted him evaluated sooner than his routine scheduled visit due to recent increase in A1c value to 7.1% from 5.9%   Pt notes he is still taking Januvia as prescribed  Notes he does enjoy sweets and has not really been cutting back on this since last office visit     Pt notes he is also switching his prescriptions to [facility]. Would like several refills sent to them today. Requesting his Xanax be sent there as well     The following portions of the patient's history were reviewed and updated as appropriate: allergies, current medications, past family history, past medical history, past social history, past surgical history and problem list.    Review of Systems    Objective    Blood pressure 110/74, pulse 92, temperature 96.9 °F (36.1 °C), temperature source Infrared, resp. rate 16, height 175.3 cm (69.02\"), weight 93.7 kg (206 lb 9.6 oz), SpO2 97 %.     Physical Exam    Assessment/Plan   Diagnoses and all orders for this visit:    1. Type 2 diabetes mellitus without complication, without long-term current use of insulin (CMS/[facility]) (Primary)  -     POC Glycosylated Hemoglobin (Hb A1C)  -     SITagliptin (Januvia) 100 MG tablet; Take 1 tablet by mouth Daily.  Dispense: 90 tablet; Refill: 1    2. Essential hypertension  Comments:  Adequate control-no changes.  Orders:  -     furosemide (LASIX) 20 MG tablet; TAKE TWO TABLETS BY MOUTH EVERY MORNING AND TAKE ONE TABLET EVERY EVENING  Dispense: 270 tablet; Refill: 1    3. Hyperlipidemia, unspecified hyperlipidemia type  -     atorvastatin (LIPITOR) 10 MG tablet; Take 1 tablet by mouth Daily.  Dispense: 90 tablet; Refill: 1    4. Seasonal allergies  -     loratadine (CLARITIN) 10 MG tablet; Take 1 tablet by mouth Daily.  Dispense: 90 tablet; Refill: 1    5. Gastroesophageal reflux disease, unspecified whether esophagitis present  -     omeprazole " (priLOSEC) 20 MG capsule; Take 1 capsule by mouth Daily.  Dispense: 90 capsule; Refill: 1    6. Anxiety  -     DULoxetine (CYMBALTA) 60 MG capsule; Take 1 capsule by mouth Daily.  Dispense: 90 capsule; Refill: 1      POC A1c remains about the same at 7.0%. discussed with patient to work on cutting back on sweets, breads, pastas, crackers, cakes etc. Will check again in 3 months once he makes these changes. Continue with Januvia Rx.   Will send requested medications to mail order pharmacy. Recent labs with PCP reviewed   Will send request for controlled anxiety medication to his PCP for consideration

## 2021-06-30 ENCOUNTER — TELEPHONE (OUTPATIENT)
Dept: FAMILY MEDICINE CLINIC | Facility: CLINIC | Age: 76
End: 2021-06-30

## 2021-06-30 DIAGNOSIS — I73.9 PERIPHERAL VASCULAR DISEASE OF LOWER EXTREMITY (HCC): Primary | ICD-10-CM

## 2021-06-30 NOTE — TELEPHONE ENCOUNTER
Pt wife araseli called in statin that she would like for dr. Coronel to place a referral to Dr. Armstrong vascular surgeon. Dr. Miller told him that he had a plaque build up.

## 2021-09-16 ENCOUNTER — OFFICE VISIT (OUTPATIENT)
Dept: FAMILY MEDICINE CLINIC | Facility: CLINIC | Age: 76
End: 2021-09-16

## 2021-09-16 VITALS
BODY MASS INDEX: 30.81 KG/M2 | RESPIRATION RATE: 18 BRPM | SYSTOLIC BLOOD PRESSURE: 122 MMHG | TEMPERATURE: 98.1 F | WEIGHT: 208 LBS | DIASTOLIC BLOOD PRESSURE: 78 MMHG | HEART RATE: 82 BPM | HEIGHT: 69 IN

## 2021-09-16 DIAGNOSIS — E11.9 TYPE 2 DIABETES MELLITUS WITHOUT COMPLICATION, WITHOUT LONG-TERM CURRENT USE OF INSULIN (HCC): ICD-10-CM

## 2021-09-16 DIAGNOSIS — F41.1 GENERALIZED ANXIETY DISORDER: ICD-10-CM

## 2021-09-16 DIAGNOSIS — L98.9 SKIN LESION OF SCALP: Primary | ICD-10-CM

## 2021-09-16 DIAGNOSIS — I10 ESSENTIAL HYPERTENSION: ICD-10-CM

## 2021-09-16 PROCEDURE — 99213 OFFICE O/P EST LOW 20 MIN: CPT | Performed by: FAMILY MEDICINE

## 2021-09-16 RX ORDER — TRIAMCINOLONE ACETONIDE 1 MG/G
1 CREAM TOPICAL 2 TIMES DAILY
Qty: 15 G | Refills: 1 | Status: SHIPPED | OUTPATIENT
Start: 2021-09-16 | End: 2021-11-04 | Stop reason: SDUPTHER

## 2021-09-16 RX ORDER — ALPRAZOLAM 0.5 MG/1
TABLET ORAL
Qty: 180 TABLET | Refills: 2 | Status: SHIPPED | OUTPATIENT
Start: 2021-09-16 | End: 2021-12-27 | Stop reason: SDUPTHER

## 2021-09-16 NOTE — PROGRESS NOTES
"Chief Complaint  Skin Problem (wants derm referral )    Subjective          Antione Douglas presents to [facility]  History of Present Illness    Skin issue  Scalp bumps  Saw Dr Gutiérrez and rec head and shoulders and wants to see different one  Doing Selsun blue now    Place on side that Dr Coronel has frozen off twice        HTN  BP increased some today  Occ grady left ant chest wall pain   No CAD     Anxiety.  Stable on alprazolam.  Needs refill.  Will be follow-up next week for regular checkup.        Review of Systems   Constitutional: Negative.    HENT: Negative.    Respiratory: Negative.    Cardiovascular: Negative.         Objective       Vital Signs:   /78   Pulse 82   Temp 98.1 °F (36.7 °C)   Resp 18   Ht 175.3 cm (69\")   Wt 94.3 kg (208 lb)   BMI 30.72 kg/m²     Physical Exam  Vitals and nursing note reviewed.   Constitutional:       Appearance: He is well-developed.   HENT:      Head: Normocephalic and atraumatic.      Right Ear: External ear normal.      Left Ear: External ear normal.   Eyes:      Pupils: Pupils are equal, round, and reactive to light.   Cardiovascular:      Rate and Rhythm: Normal rate and regular rhythm.      Heart sounds: Normal heart sounds.   Pulmonary:      Effort: Pulmonary effort is normal. No respiratory distress.      Breath sounds: Normal breath sounds. No wheezing or rales.   Skin:     General: Skin is warm and dry.   Neurological:      Mental Status: He is alert and oriented to person, place, and time.   Psychiatric:         Behavior: Behavior normal.        Posterior left scalp reveals 1 small nonhealing nodule.    Result Review :                     Assessment and Plan    Diagnoses and all orders for this visit:    1. Skin lesion of scalp (Primary)  -     triamcinolone (KENALOG) 0.1 % cream; Apply 1 application topically to the appropriate area as directed 2 (Two) Times a Day.  Dispense: 15 g; Refill: 1    2. Type 2 diabetes " mellitus without complication, without long-term current use of insulin (CMS/[facility])  -     Comprehensive Metabolic Panel  -     Lipid Panel With / Chol / HDL Ratio  -     Hemoglobin A1c    3. Essential hypertension  -     CBC & Differential    4. Generalized anxiety disorder  -     ALPRAZolam (XANAX) 0.5 MG tablet; 1-2 po tid prn anxiety  Dispense: 180 tablet; Refill: 2          DISCUSSION    Patient mainly here for scalp lesion.  Trial of triamcinolone cream twice a day.  Follow-up next week as scheduled for well examination and chronic problem checkup.    As courtesy, went ahead and ordered labs and will discuss with patient at follow-up.    Anxiety.  Stable.  Alprazolam 0.5 mg 1-2 3 times a day as needed for anxiety.  Stable.  No evidence of misuse or diversion.    Pravin dated 9/16/2021  was reviewed and appropriate.     Follow Up   Return for Next scheduled follow up.    Patient was given instructions and counseling regarding his condition or for health maintenance advice. Please see specific information pulled into the AVS if appropriate.       Rafa Gómez MD

## 2021-09-17 LAB
ALBUMIN SERPL-MCNC: 4.1 G/DL (ref 3.7–4.7)
ALBUMIN/GLOB SERPL: 1.3 {RATIO} (ref 1.2–2.2)
ALP SERPL-CCNC: 101 IU/L (ref 44–121)
ALT SERPL-CCNC: 21 IU/L (ref 0–44)
AST SERPL-CCNC: 18 IU/L (ref 0–40)
BASOPHILS # BLD AUTO: 0.1 X10E3/UL (ref 0–0.2)
BASOPHILS NFR BLD AUTO: 1 %
BILIRUB SERPL-MCNC: 0.4 MG/DL (ref 0–1.2)
BUN SERPL-MCNC: 20 MG/DL (ref 8–27)
BUN/CREAT SERPL: 13 (ref 10–24)
CALCIUM SERPL-MCNC: 9.4 MG/DL (ref 8.6–10.2)
CHLORIDE SERPL-SCNC: 100 MMOL/L (ref 96–106)
CHOLEST SERPL-MCNC: 131 MG/DL (ref 100–199)
CHOLEST/HDLC SERPL: 3.2 RATIO (ref 0–5)
CO2 SERPL-SCNC: 24 MMOL/L (ref 20–29)
CREAT SERPL-MCNC: 1.52 MG/DL (ref 0.76–1.27)
EOSINOPHIL # BLD AUTO: 0.3 X10E3/UL (ref 0–0.4)
EOSINOPHIL NFR BLD AUTO: 2 %
ERYTHROCYTE [DISTWIDTH] IN BLOOD BY AUTOMATED COUNT: 14.3 % (ref 11.6–15.4)
GLOBULIN SER CALC-MCNC: 3.1 G/DL (ref 1.5–4.5)
GLUCOSE SERPL-MCNC: 119 MG/DL (ref 65–99)
HBA1C MFR BLD: 7.8 % (ref 4.8–5.6)
HCT VFR BLD AUTO: 46.9 % (ref 37.5–51)
HDLC SERPL-MCNC: 41 MG/DL
HGB BLD-MCNC: 15.7 G/DL (ref 13–17.7)
IMM GRANULOCYTES # BLD AUTO: 0.1 X10E3/UL (ref 0–0.1)
IMM GRANULOCYTES NFR BLD AUTO: 1 %
LDLC SERPL CALC-MCNC: 74 MG/DL (ref 0–99)
LYMPHOCYTES # BLD AUTO: 3.5 X10E3/UL (ref 0.7–3.1)
LYMPHOCYTES NFR BLD AUTO: 25 %
MCH RBC QN AUTO: 30 PG (ref 26.6–33)
MCHC RBC AUTO-ENTMCNC: 33.5 G/DL (ref 31.5–35.7)
MCV RBC AUTO: 90 FL (ref 79–97)
MONOCYTES # BLD AUTO: 1 X10E3/UL (ref 0.1–0.9)
MONOCYTES NFR BLD AUTO: 7 %
NEUTROPHILS # BLD AUTO: 8.9 X10E3/UL (ref 1.4–7)
NEUTROPHILS NFR BLD AUTO: 64 %
PLATELET # BLD AUTO: 318 X10E3/UL (ref 150–450)
POTASSIUM SERPL-SCNC: 4.9 MMOL/L (ref 3.5–5.2)
PROT SERPL-MCNC: 7.2 G/DL (ref 6–8.5)
RBC # BLD AUTO: 5.24 X10E6/UL (ref 4.14–5.8)
SODIUM SERPL-SCNC: 139 MMOL/L (ref 134–144)
TRIGL SERPL-MCNC: 83 MG/DL (ref 0–149)
VLDLC SERPL CALC-MCNC: 16 MG/DL (ref 5–40)
WBC # BLD AUTO: 13.9 X10E3/UL (ref 3.4–10.8)

## 2021-09-24 ENCOUNTER — OFFICE VISIT (OUTPATIENT)
Dept: FAMILY MEDICINE CLINIC | Facility: CLINIC | Age: 76
End: 2021-09-24

## 2021-09-24 VITALS
HEART RATE: 76 BPM | HEIGHT: 69 IN | WEIGHT: 209 LBS | BODY MASS INDEX: 30.96 KG/M2 | SYSTOLIC BLOOD PRESSURE: 130 MMHG | TEMPERATURE: 97 F | RESPIRATION RATE: 18 BRPM | DIASTOLIC BLOOD PRESSURE: 70 MMHG

## 2021-09-24 DIAGNOSIS — E11.9 TYPE 2 DIABETES MELLITUS WITHOUT COMPLICATION, WITHOUT LONG-TERM CURRENT USE OF INSULIN (HCC): Primary | ICD-10-CM

## 2021-09-24 DIAGNOSIS — N18.30 STAGE 3 CHRONIC KIDNEY DISEASE, UNSPECIFIED WHETHER STAGE 3A OR 3B CKD (HCC): ICD-10-CM

## 2021-09-24 DIAGNOSIS — F41.1 GENERALIZED ANXIETY DISORDER: ICD-10-CM

## 2021-09-24 DIAGNOSIS — E78.2 MIXED HYPERLIPIDEMIA: ICD-10-CM

## 2021-09-24 PROCEDURE — 99214 OFFICE O/P EST MOD 30 MIN: CPT | Performed by: FAMILY MEDICINE

## 2021-09-24 NOTE — PROGRESS NOTES
"Chief Complaint  Diabetes (3 month f/u )    Subjective          Antione Douglas presents to [facility]  History of Present Illness      DM2  Has been eating more pies of late  A1c increase  sugar 135 this am  No low sugars  Feet numb from neuropathy, no meds   Feels cold and not pain in feet    Leukocytosis  Had mild sinus infection before the lab draw  Getting better now  No fever  No wt loss    Back pain   All the time  Lower back pain     Cane for balance        Review of Systems   Constitutional: Negative.    HENT: Negative.    Respiratory: Negative.  Negative for shortness of breath.    Cardiovascular: Negative.  Negative for chest pain.   Gastrointestinal: Negative.  Negative for blood in stool.   Genitourinary: Negative.    Musculoskeletal: Positive for back pain. Negative for arthralgias.        Objective       Vital Signs:   /70   Pulse 76   Temp 97 °F (36.1 °C)   Resp 18   Ht 175.3 cm (69\")   Wt 94.8 kg (209 lb)   BMI 30.86 kg/m²     Physical Exam  Vitals and nursing note reviewed.   Constitutional:       Appearance: He is well-developed.   HENT:      Head: Normocephalic and atraumatic.      Right Ear: External ear normal.      Left Ear: External ear normal.   Eyes:      Pupils: Pupils are equal, round, and reactive to light.   Cardiovascular:      Rate and Rhythm: Normal rate and regular rhythm.      Heart sounds: Normal heart sounds.   Pulmonary:      Effort: Pulmonary effort is normal. No respiratory distress.      Breath sounds: Normal breath sounds. No wheezing or rales.   Abdominal:      General: There is no distension.      Tenderness: There is no abdominal tenderness. There is no guarding or rebound.   Musculoskeletal:      Right lower leg: Edema ( Trace) present.      Left lower leg: Edema ( Trace) present.   Skin:     General: Skin is warm and dry.   Neurological:      Mental Status: He is alert and oriented to person, place, and time.   Psychiatric:    "      Behavior: Behavior normal.          Result Review :     CMP    CMP 2/8/21 2/22/21 9/16/21   Glucose 143 (A) 139 (A) 119 (A)   BUN 21 27 20   Creatinine 1.55 (A) 1.58 (A) 1.52 (A)   eGFR Non  Am 43 (A) 42 (A) 44 (A)   eGFR  Am 50 (A) 49 (A) 51 (A)   Sodium 140 142 139   Potassium 4.4 4.7 4.9   Chloride 100 99 100   Calcium 9.4 9.9 9.4   Total Protein 7.0  7.2   Albumin 3.9  4.1   Globulin 3.1  3.1   Total Bilirubin 0.3  0.4   Alkaline Phosphatase 86  101   AST (SGOT) 24  18   ALT (SGPT) 29  21   (A) Abnormal value       Comments are available for some flowsheets but are not being displayed.           Lipid Panel    Lipid Panel 2/8/21 9/16/21   Total Cholesterol 125 131   Triglycerides 83 83   HDL Cholesterol 39 (A) 41   VLDL Cholesterol 16 16   LDL Cholesterol  70 74   LDL/HDL Ratio 1.8    (A) Abnormal value       Comments are available for some flowsheets but are not being displayed.           Most Recent A1C    HGBA1C Most Recent 9/16/21   Hemoglobin A1C 7.8 (A)   (A) Abnormal value       Comments are available for some flowsheets but are not being displayed.                         Assessment and Plan    Diagnoses and all orders for this visit:    1. Type 2 diabetes mellitus without complication, without long-term current use of insulin (HCC) (Primary)    2. Generalized anxiety disorder    3. Mixed hyperlipidemia    4. Stage 3 chronic kidney disease, unspecified whether stage 3a or 3b CKD ([facility])          DISCUSSION    Diabetes mellitus type 2.  A1c is increased a little to 7.8.  Recommend continue efforts to decrease weight, decrease carbohydrates and sweets in diet.  He admits he has been eating more unhealthy of late.  Recheck in 3 months.    Anxiety.  Continue alprazolam.  Refilled last visit.  No evidence of misuse or diversion.    Hyperlipidemia.  Repeat cholesterol was good.    Chronic kidney disease.  Stable.        Follow Up   Return in about 3 months (around 12/13/2021).    Patient was  given instructions and counseling regarding his condition or for health maintenance advice. Please see specific information pulled into the AVS if appropriate.       Rafa Gómez MD

## 2021-10-07 ENCOUNTER — TELEPHONE (OUTPATIENT)
Dept: FAMILY MEDICINE CLINIC | Facility: CLINIC | Age: 76
End: 2021-10-07

## 2021-10-07 NOTE — TELEPHONE ENCOUNTER
Informed the patient that he can stop into the office and we can print an immunization certificate for him to show proof of this

## 2021-10-07 NOTE — TELEPHONE ENCOUNTER
Caller: Antione Douglas    Relationship to patient: Self    Best call back number: [phone]    Patient is needing: PATIENT WAS NEEDING SOME WAY OF VERIFICATION FOR LAST TWO COVID VACCINATIONS IN ORDER TO GET THE BOOSTER    PLEASE ADVISE

## 2021-10-25 ENCOUNTER — FLU SHOT (OUTPATIENT)
Dept: FAMILY MEDICINE CLINIC | Facility: CLINIC | Age: 76
End: 2021-10-25

## 2021-10-25 DIAGNOSIS — Z23 NEED FOR INFLUENZA VACCINATION: Primary | ICD-10-CM

## 2021-10-25 PROCEDURE — G0008 ADMIN INFLUENZA VIRUS VAC: HCPCS | Performed by: FAMILY MEDICINE

## 2021-10-25 PROCEDURE — 90662 IIV NO PRSV INCREASED AG IM: CPT | Performed by: FAMILY MEDICINE

## 2021-11-02 ENCOUNTER — TELEPHONE (OUTPATIENT)
Dept: FAMILY MEDICINE CLINIC | Facility: CLINIC | Age: 76
End: 2021-11-02

## 2021-11-02 DIAGNOSIS — L98.9 SKIN LESION OF SCALP: ICD-10-CM

## 2021-11-02 NOTE — TELEPHONE ENCOUNTER
Please call.  We received a refill request for triamcinolone to go to [facility] mail order.  Did he request this?

## 2021-11-03 NOTE — TELEPHONE ENCOUNTER
PATIENT'S WIFED CALLED AND SHE STATED HE CALLED TO SEE IF HE COULD ALREADY  HAVE IT APPROVED JUST INCASE HE NEEDS  IN SOMETIME IN THE FUTURE

## 2021-11-04 RX ORDER — TRIAMCINOLONE ACETONIDE 1 MG/G
1 CREAM TOPICAL 2 TIMES DAILY
Qty: 45 G | Refills: 1 | Status: SHIPPED | OUTPATIENT
Start: 2021-11-04 | End: 2022-03-30

## 2021-11-18 DIAGNOSIS — E11.9 TYPE 2 DIABETES MELLITUS WITHOUT COMPLICATION, WITHOUT LONG-TERM CURRENT USE OF INSULIN (HCC): ICD-10-CM

## 2021-11-18 DIAGNOSIS — I10 ESSENTIAL HYPERTENSION: ICD-10-CM

## 2021-11-18 DIAGNOSIS — F41.9 ANXIETY: ICD-10-CM

## 2021-11-18 RX ORDER — SITAGLIPTIN 100 MG/1
TABLET, FILM COATED ORAL
Qty: 90 TABLET | Refills: 1 | Status: SHIPPED | OUTPATIENT
Start: 2021-11-18 | End: 2022-06-09

## 2021-11-18 RX ORDER — FUROSEMIDE 20 MG/1
TABLET ORAL
Qty: 270 TABLET | Refills: 1 | Status: SHIPPED | OUTPATIENT
Start: 2021-11-18 | End: 2022-04-14

## 2021-11-18 RX ORDER — DULOXETIN HYDROCHLORIDE 60 MG/1
CAPSULE, DELAYED RELEASE ORAL
Qty: 90 CAPSULE | Refills: 1 | Status: SHIPPED | OUTPATIENT
Start: 2021-11-18 | End: 2022-04-14

## 2021-11-18 NOTE — TELEPHONE ENCOUNTER
Rx Refill Note  Requested Prescriptions     Pending Prescriptions Disp Refills   • DULoxetine (CYMBALTA) 60 MG capsule [Pharmacy Med Name: DULOXETINE HYDROCHLORIDE 60 MG Capsule Delayed Release Particles] 90 capsule 1     Sig: TAKE 1 CAPSULE EVERY DAY   • furosemide (LASIX) 20 MG tablet [Pharmacy Med Name: FUROSEMIDE 20 MG Tablet] 270 tablet 1     Sig: TAKE TWO TABLETS BY MOUTH EVERY MORNING AND TAKE ONE TABLET EVERY EVENING   • Januvia 100 MG tablet [Pharmacy Med Name: JANUVIA 100 MG Tablet] 90 tablet 1     Sig: TAKE 1 TABLET EVERY DAY      Last office visit with prescribing clinician: 6/24/2021      Next office visit with prescribing clinician: Visit date not found            Chrissy Nova  11/18/21, 07:36 EST

## 2021-11-24 DIAGNOSIS — K21.9 GASTROESOPHAGEAL REFLUX DISEASE, UNSPECIFIED WHETHER ESOPHAGITIS PRESENT: ICD-10-CM

## 2021-11-24 RX ORDER — OMEPRAZOLE 20 MG/1
CAPSULE, DELAYED RELEASE ORAL
Qty: 90 CAPSULE | Refills: 0 | Status: SHIPPED | OUTPATIENT
Start: 2021-11-24 | End: 2022-02-07

## 2021-12-27 ENCOUNTER — OFFICE VISIT (OUTPATIENT)
Dept: FAMILY MEDICINE CLINIC | Facility: CLINIC | Age: 76
End: 2021-12-27

## 2021-12-27 VITALS
BODY MASS INDEX: 30.72 KG/M2 | HEART RATE: 50 BPM | SYSTOLIC BLOOD PRESSURE: 120 MMHG | OXYGEN SATURATION: 95 % | DIASTOLIC BLOOD PRESSURE: 74 MMHG | WEIGHT: 207.4 LBS | RESPIRATION RATE: 18 BRPM | HEIGHT: 69 IN | TEMPERATURE: 98 F

## 2021-12-27 DIAGNOSIS — J30.2 SEASONAL ALLERGIES: ICD-10-CM

## 2021-12-27 DIAGNOSIS — Z12.11 COLON CANCER SCREENING: ICD-10-CM

## 2021-12-27 DIAGNOSIS — E78.2 MIXED HYPERLIPIDEMIA: ICD-10-CM

## 2021-12-27 DIAGNOSIS — M79.672 FOOT PAIN, LEFT: ICD-10-CM

## 2021-12-27 DIAGNOSIS — J43.1 PANLOBULAR EMPHYSEMA (HCC): ICD-10-CM

## 2021-12-27 DIAGNOSIS — R09.89 ABSENCE OF LEFT DORSALIS PEDIS ARTERY PULSE: ICD-10-CM

## 2021-12-27 DIAGNOSIS — F41.1 GENERALIZED ANXIETY DISORDER: ICD-10-CM

## 2021-12-27 DIAGNOSIS — L91.8 INFLAMED SKIN TAG: ICD-10-CM

## 2021-12-27 DIAGNOSIS — R20.9 BILATERAL COLD FEET: ICD-10-CM

## 2021-12-27 DIAGNOSIS — E11.9 TYPE 2 DIABETES MELLITUS WITHOUT COMPLICATION, WITHOUT LONG-TERM CURRENT USE OF INSULIN (HCC): Primary | ICD-10-CM

## 2021-12-27 DIAGNOSIS — I10 ESSENTIAL HYPERTENSION: ICD-10-CM

## 2021-12-27 PROCEDURE — 17000 DESTRUCT PREMALG LESION: CPT | Performed by: FAMILY MEDICINE

## 2021-12-27 PROCEDURE — 99214 OFFICE O/P EST MOD 30 MIN: CPT | Performed by: FAMILY MEDICINE

## 2021-12-27 RX ORDER — LORATADINE 10 MG/1
10 TABLET ORAL DAILY
Qty: 90 TABLET | Refills: 1 | Status: SHIPPED | OUTPATIENT
Start: 2021-12-27 | End: 2022-06-24

## 2021-12-27 RX ORDER — ATORVASTATIN CALCIUM 10 MG/1
10 TABLET, FILM COATED ORAL DAILY
Qty: 90 TABLET | Refills: 1 | Status: SHIPPED | OUTPATIENT
Start: 2021-12-27 | End: 2022-08-21 | Stop reason: SDUPTHER

## 2021-12-27 RX ORDER — ALPRAZOLAM 0.5 MG/1
TABLET ORAL
Qty: 180 TABLET | Refills: 2 | Status: SHIPPED | OUTPATIENT
Start: 2021-12-27 | End: 2022-02-17 | Stop reason: SDUPTHER

## 2021-12-27 NOTE — PROGRESS NOTES
Chief Complaint  Diabetes (f/up)    Subjective          Antione Douglas presents to [facility]  History of Present Illness.   The patient consents to be recorded using NANCY.     Diabetes.   The patient reports that he does not check his blood sugars regularly, but is eating a healthy diet. He does report having bilateral peripheral neuropathy. It has not improved since his last visit. The patient did take gabapentin, but stopped taking it, as is wife noted behavioral changes. He does see a podiatrist, and does have diabetic shoes.   The chart also mentions side effects with Lyrica as well but he is unsure. Had similar episode with Lyrica including confusion.    Abdominal pain.   The patient does have some right sided abdominal pain, he suspects may be a hernia. No reported nausea or vomiting. No bowel changes.    Insomnia.   The patient has been managing his insomnia with Tylenol PM. He is unsure if it is effecting his kidneys.     Anxiety.   The patient has been managing his anxiety with alprazolam.     Allergies.   The patient manages his allergies with loratadine.     Emphysema.   The patient does wheeze. He also uses Symbicort and Spiriva.     Hyperlipidemia.   The patient has been taking Lipitor and atorvastatin for his high cholesterol.     Vaccinations.   The patient has had all 3 COVID-19 shots vaccinations. He received the booster on 11/15/2021. He also received the influenza vaccine.     Colon Cancer screening.   The patient last had a colonoscopy in 04/2018 and a large polyp was removed. He is due for another screening.       In addition, he reports an irritated skin tag on the right side of his neck that he would like to have frozen.  He has had lesions frozen in the past and aware of side effects.  It becomes irritated and gets caught when shaving.      Review of Systems   Constitutional: Negative.    HENT: Negative.    Respiratory: Negative.    Cardiovascular: Negative.   "  Gastrointestinal: Negative.  Negative for constipation, diarrhea, nausea and vomiting.   Skin: Positive for skin lesions ( Right side of neck. Skin tag.).        Objective       Vital Signs:   /74   Pulse 50   Temp 98 °F (36.7 °C)   Resp 18   Ht 175.3 cm (69.02\")   Wt 94.1 kg (207 lb 6.4 oz)   SpO2 95%   BMI 30.61 kg/m²     Physical Exam  Vitals and nursing note reviewed.   Constitutional:       Appearance: He is well-developed.   HENT:      Head: Normocephalic and atraumatic.      Right Ear: Tympanic membrane, ear canal and external ear normal.      Left Ear: Tympanic membrane, ear canal and external ear normal.      Nose: No congestion or rhinorrhea.      Mouth/Throat:      Pharynx: Oropharynx is clear. No oropharyngeal exudate or posterior oropharyngeal erythema.   Eyes:      Pupils: Pupils are equal, round, and reactive to light.   Cardiovascular:      Rate and Rhythm: Normal rate and regular rhythm.      Pulses:           Dorsalis pedis pulses are 1+ on the right side and 1+ on the left side.      Heart sounds: Normal heart sounds.   Pulmonary:      Effort: Pulmonary effort is normal. No respiratory distress.      Breath sounds: Normal breath sounds. No wheezing or rales.   Musculoskeletal:      Right foot: Normal range of motion. No deformity.      Left foot: Normal range of motion. No deformity.   Feet:      Right foot:      Protective Sensation: 5 sites tested. 1 site sensed.     Skin integrity: Callus and dry skin present. No ulcer or skin breakdown.      Left foot:      Protective Sensation: 5 sites tested. 1 site sensed.     Skin integrity: Callus and dry skin present. No ulcer or skin breakdown.      Comments: Diabetic Foot Exam Performed and Monofilament Test Performed   No DP felt in the left foot, faint DP right foot  Skin:     General: Skin is warm and dry.   Neurological:      Mental Status: He is alert.   Psychiatric:         Behavior: Behavior normal.     Right side of neck reveals " a 2 mm irritated skin tag.    Result Review :            Skin Tag Removal    Date/Time: 12/27/2021 5:41 PM  Performed by: Rafa Gómez MD  Authorized by: Rafa Gómez MD   Consent: Verbal consent obtained. Written consent not obtained.  Risks and benefits: risks, benefits and alternatives were discussed (Risk of cryotherapy was explained including pain, infection, return of lesion and blistering)  Consent given by: patient  Patient understanding: patient states understanding of the procedure being performed  Patient identity confirmed: verbally with patient  Local anesthesia used: no    Anesthesia:  Local anesthesia used: no    Sedation:  Patient sedated: no    Comments: Lesion on right side of the neck consistent with irritated/inflamed skin tag approximately 2 mm in size was frozen x3 with liquid nitrogen.  Tolerated well.              Assessment and Plan    Diagnoses and all orders for this visit:    1. Type 2 diabetes mellitus without complication, without long-term current use of insulin (HCC) (Primary)  -     Comprehensive Metabolic Panel  -     Hemoglobin A1c    2. Essential hypertension  -     CBC & Differential  -     Comprehensive Metabolic Panel    3. Mixed hyperlipidemia  -     Comprehensive Metabolic Panel  -     atorvastatin (LIPITOR) 10 MG tablet; Take 1 tablet by mouth Daily.  Dispense: 90 tablet; Refill: 1    4. Generalized anxiety disorder  -     ALPRAZolam (XANAX) 0.5 MG tablet; 1-2 po tid prn anxiety  Dispense: 180 tablet; Refill: 2    5. Seasonal allergies  -     loratadine (CLARITIN) 10 MG tablet; Take 1 tablet by mouth Daily.  Dispense: 90 tablet; Refill: 1    6. Panlobular emphysema (HCC)       -     Continue managing symptoms with Symbicort and Spiriva.     7. Colon cancer screening  -     Ambulatory Referral For Screening Colonoscopy    8. Absence of left dorsalis pedis artery pulse  -     Doppler Arterial Multi Level Lower Extremity - Bilateral CAR; Future    9. Bilateral cold  feet  -     Doppler Arterial Multi Level Lower Extremity - Bilateral CAR; Future    10. Foot pain, left  -     Doppler Arterial Multi Level Lower Extremity - Bilateral CAR; Future    11. Inflamed skin tag  -     Skin Tag Removal          DISCUSSION    Due to the absence of left dorsalis pedis pulse, recommend check Doppler as noted. Further plan once that is back.    Wound care information given for skin tag removal. He will use antibiotic ointment if blisters.    He is due for colon cancer screening. We will get that set up.    Blood pressure stable on current medications.    Diabetes mellitus type 2. Recheck A1c to determine stability.    Follow Up   Return for follow up depends on review of labs and testing.    Patient was given instructions and counseling regarding his condition or for health maintenance advice. Please see specific information pulled into the AVS if appropriate.       Tanya Rangel     Transcribed from ambient dictation for Rafa Gómez MD by Tanya Rangel.  12/27/21   19:59 EST    Patient verbalized consent to the visit recording.  I have personally performed the services described in this document as transcribed by the above individual, and it is both accurate and complete.  Rafa Gómez MD  12/28/2021  08:25 EST

## 2021-12-28 DIAGNOSIS — D72.829 LEUKOCYTOSIS, UNSPECIFIED TYPE: Primary | ICD-10-CM

## 2021-12-28 DIAGNOSIS — N28.9 RENAL INSUFFICIENCY: ICD-10-CM

## 2021-12-28 LAB
ALBUMIN SERPL-MCNC: 4.2 G/DL (ref 3.7–4.7)
ALBUMIN/GLOB SERPL: 1.4 {RATIO} (ref 1.2–2.2)
ALP SERPL-CCNC: 96 IU/L (ref 44–121)
ALT SERPL-CCNC: 21 IU/L (ref 0–44)
AST SERPL-CCNC: 18 IU/L (ref 0–40)
BASOPHILS # BLD AUTO: 0.1 X10E3/UL (ref 0–0.2)
BASOPHILS NFR BLD AUTO: 1 %
BILIRUB SERPL-MCNC: <0.2 MG/DL (ref 0–1.2)
BUN SERPL-MCNC: 18 MG/DL (ref 8–27)
BUN/CREAT SERPL: 11 (ref 10–24)
CALCIUM SERPL-MCNC: 9.5 MG/DL (ref 8.6–10.2)
CHLORIDE SERPL-SCNC: 101 MMOL/L (ref 96–106)
CO2 SERPL-SCNC: 25 MMOL/L (ref 20–29)
CREAT SERPL-MCNC: 1.71 MG/DL (ref 0.76–1.27)
EOSINOPHIL # BLD AUTO: 0.3 X10E3/UL (ref 0–0.4)
EOSINOPHIL NFR BLD AUTO: 2 %
ERYTHROCYTE [DISTWIDTH] IN BLOOD BY AUTOMATED COUNT: 13.6 % (ref 11.6–15.4)
GLOBULIN SER CALC-MCNC: 2.9 G/DL (ref 1.5–4.5)
GLUCOSE SERPL-MCNC: 117 MG/DL (ref 65–99)
HBA1C MFR BLD: 7 % (ref 4.8–5.6)
HCT VFR BLD AUTO: 47.4 % (ref 37.5–51)
HGB BLD-MCNC: 15.6 G/DL (ref 13–17.7)
IMM GRANULOCYTES # BLD AUTO: 0.1 X10E3/UL (ref 0–0.1)
IMM GRANULOCYTES NFR BLD AUTO: 1 %
LYMPHOCYTES # BLD AUTO: 4 X10E3/UL (ref 0.7–3.1)
LYMPHOCYTES NFR BLD AUTO: 26 %
MCH RBC QN AUTO: 30 PG (ref 26.6–33)
MCHC RBC AUTO-ENTMCNC: 32.9 G/DL (ref 31.5–35.7)
MCV RBC AUTO: 91 FL (ref 79–97)
MONOCYTES # BLD AUTO: 1.2 X10E3/UL (ref 0.1–0.9)
MONOCYTES NFR BLD AUTO: 8 %
NEUTROPHILS # BLD AUTO: 9.7 X10E3/UL (ref 1.4–7)
NEUTROPHILS NFR BLD AUTO: 62 %
PLATELET # BLD AUTO: 295 X10E3/UL (ref 150–450)
POTASSIUM SERPL-SCNC: 4.5 MMOL/L (ref 3.5–5.2)
PROT SERPL-MCNC: 7.1 G/DL (ref 6–8.5)
RBC # BLD AUTO: 5.2 X10E6/UL (ref 4.14–5.8)
SODIUM SERPL-SCNC: 142 MMOL/L (ref 134–144)
WBC # BLD AUTO: 15.5 X10E3/UL (ref 3.4–10.8)

## 2021-12-30 ENCOUNTER — TELEPHONE (OUTPATIENT)
Dept: FAMILY MEDICINE CLINIC | Facility: CLINIC | Age: 76
End: 2021-12-30

## 2022-02-05 DIAGNOSIS — K21.9 GASTROESOPHAGEAL REFLUX DISEASE, UNSPECIFIED WHETHER ESOPHAGITIS PRESENT: ICD-10-CM

## 2022-02-07 RX ORDER — OMEPRAZOLE 20 MG/1
CAPSULE, DELAYED RELEASE ORAL
Qty: 90 CAPSULE | Refills: 0 | Status: SHIPPED | OUTPATIENT
Start: 2022-02-07 | End: 2022-04-22

## 2022-02-17 DIAGNOSIS — F41.1 GENERALIZED ANXIETY DISORDER: ICD-10-CM

## 2022-02-17 RX ORDER — ALPRAZOLAM 0.5 MG/1
TABLET ORAL
Qty: 180 TABLET | Refills: 1 | Status: SHIPPED | OUTPATIENT
Start: 2022-02-17 | End: 2022-04-14 | Stop reason: SDUPTHER

## 2022-03-01 ENCOUNTER — TELEPHONE (OUTPATIENT)
Dept: PEDIATRICS | Facility: OTHER | Age: 77
End: 2022-03-01

## 2022-03-01 RX ORDER — AMOXICILLIN 500 MG/1
500 CAPSULE ORAL 3 TIMES DAILY
Qty: 30 CAPSULE | Refills: 0 | Status: SHIPPED | OUTPATIENT
Start: 2022-03-01 | End: 2022-03-07

## 2022-03-01 NOTE — TELEPHONE ENCOUNTER
Caller: Antione Douglas    Relationship: Self    Best call back number: [phone]    What medication are you requesting: ANTIOBOTIC    What are your current symptoms: SORE THROAT; RUNNY NOSE (YELLOW IN COLOR) CONGESTION    How long have you been experiencing symptoms:2 DAYS    Have you had these symptoms before:    [x] Yes  [] No    Have you been treated for these symptoms before:   [x] Yes  [] No    If a prescription is needed, what is your preferred pharmacy and phone number: [facility] [address] - [address] [place] [address] [phone] [facility] [phone]

## 2022-03-01 NOTE — TELEPHONE ENCOUNTER
Please call.  Any facial pain?  Fever?  Productive cough?  Exposed anyone with Covid?  Has he had a Covid test?

## 2022-03-07 ENCOUNTER — TELEPHONE (OUTPATIENT)
Dept: FAMILY MEDICINE CLINIC | Facility: CLINIC | Age: 77
End: 2022-03-07

## 2022-03-07 RX ORDER — CEFDINIR 300 MG/1
300 CAPSULE ORAL 2 TIMES DAILY
Qty: 20 CAPSULE | Refills: 0 | Status: SHIPPED | OUTPATIENT
Start: 2022-03-07 | End: 2022-03-29

## 2022-03-07 NOTE — TELEPHONE ENCOUNTER
Please call.  Changing antibiotic to Omnicef 300 mg twice a day.  If that does not help, he will need to come in for a visit to evaluate this.

## 2022-03-07 NOTE — TELEPHONE ENCOUNTER
Pt will be taking his last pill today but he is still having runny nose;fatigue; ears popping and cracking;and cough. He does not feel any better.    [phone]

## 2022-03-29 ENCOUNTER — OFFICE VISIT (OUTPATIENT)
Dept: FAMILY MEDICINE CLINIC | Facility: CLINIC | Age: 77
End: 2022-03-29

## 2022-03-29 VITALS
TEMPERATURE: 97.8 F | SYSTOLIC BLOOD PRESSURE: 110 MMHG | DIASTOLIC BLOOD PRESSURE: 72 MMHG | OXYGEN SATURATION: 97 % | HEART RATE: 105 BPM | WEIGHT: 198 LBS | HEIGHT: 69 IN | BODY MASS INDEX: 29.33 KG/M2 | RESPIRATION RATE: 18 BRPM

## 2022-03-29 DIAGNOSIS — M54.41 CHRONIC BILATERAL LOW BACK PAIN WITH BILATERAL SCIATICA: ICD-10-CM

## 2022-03-29 DIAGNOSIS — M54.42 CHRONIC BILATERAL LOW BACK PAIN WITH BILATERAL SCIATICA: ICD-10-CM

## 2022-03-29 DIAGNOSIS — H66.002 NON-RECURRENT ACUTE SUPPURATIVE OTITIS MEDIA OF LEFT EAR WITHOUT SPONTANEOUS RUPTURE OF TYMPANIC MEMBRANE: ICD-10-CM

## 2022-03-29 DIAGNOSIS — G89.29 CHRONIC BILATERAL LOW BACK PAIN WITH BILATERAL SCIATICA: ICD-10-CM

## 2022-03-29 DIAGNOSIS — J06.9 PROTRACTED URI: Primary | ICD-10-CM

## 2022-03-29 PROCEDURE — 99214 OFFICE O/P EST MOD 30 MIN: CPT | Performed by: FAMILY MEDICINE

## 2022-03-29 RX ORDER — DOXYCYCLINE HYCLATE 100 MG/1
100 CAPSULE ORAL 2 TIMES DAILY
Qty: 20 CAPSULE | Refills: 0 | Status: SHIPPED | OUTPATIENT
Start: 2022-03-29 | End: 2022-04-08

## 2022-03-29 RX ORDER — PREDNISONE 10 MG/1
TABLET ORAL
Qty: 20 TABLET | Refills: 0 | Status: SHIPPED | OUTPATIENT
Start: 2022-03-29 | End: 2022-04-08

## 2022-03-30 DIAGNOSIS — L98.9 SKIN LESION OF SCALP: ICD-10-CM

## 2022-03-30 RX ORDER — TRIAMCINOLONE ACETONIDE 1 MG/G
CREAM TOPICAL
Qty: 45 G | Refills: 1 | Status: SHIPPED | OUTPATIENT
Start: 2022-03-30 | End: 2022-04-08

## 2022-04-04 ENCOUNTER — HOSPITAL ENCOUNTER (OUTPATIENT)
Dept: GENERAL RADIOLOGY | Facility: HOSPITAL | Age: 77
Discharge: HOME | End: 2022-04-04
Admitting: FAMILY MEDICINE

## 2022-04-04 DIAGNOSIS — M54.42 CHRONIC BILATERAL LOW BACK PAIN WITH BILATERAL SCIATICA: ICD-10-CM

## 2022-04-04 DIAGNOSIS — G89.29 CHRONIC BILATERAL LOW BACK PAIN WITH BILATERAL SCIATICA: ICD-10-CM

## 2022-04-04 DIAGNOSIS — M54.41 CHRONIC BILATERAL LOW BACK PAIN WITH BILATERAL SCIATICA: ICD-10-CM

## 2022-04-04 PROCEDURE — 72100 X-RAY EXAM L-S SPINE 2/3 VWS: CPT

## 2022-04-06 ENCOUNTER — TELEPHONE (OUTPATIENT)
Dept: FAMILY MEDICINE CLINIC | Facility: CLINIC | Age: 77
End: 2022-04-06

## 2022-04-06 NOTE — TELEPHONE ENCOUNTER
Caller: Antione Douglas    Relationship: Self    Best call back number: [phone]    Caller requesting test results: PATIENT    What test was performed: XRAY OF KIDNEYS AND BACK     When was the test performed: 4/4/22    Where was the test performed: DOWN THE STREET     Additional notes: PATIENT WOULD LIKE A CALLBACK WITH RESULTS.

## 2022-04-08 ENCOUNTER — OFFICE VISIT (OUTPATIENT)
Dept: FAMILY MEDICINE CLINIC | Facility: CLINIC | Age: 77
End: 2022-04-08

## 2022-04-08 VITALS
TEMPERATURE: 97.3 F | SYSTOLIC BLOOD PRESSURE: 102 MMHG | DIASTOLIC BLOOD PRESSURE: 64 MMHG | RESPIRATION RATE: 18 BRPM | BODY MASS INDEX: 29.47 KG/M2 | WEIGHT: 199 LBS | HEART RATE: 104 BPM | HEIGHT: 69 IN | OXYGEN SATURATION: 98 %

## 2022-04-08 DIAGNOSIS — R39.89 SUSPECTED UTI: Primary | ICD-10-CM

## 2022-04-08 DIAGNOSIS — R30.0 DYSURIA: ICD-10-CM

## 2022-04-08 PROCEDURE — 81002 URINALYSIS NONAUTO W/O SCOPE: CPT | Performed by: FAMILY MEDICINE

## 2022-04-08 PROCEDURE — 99213 OFFICE O/P EST LOW 20 MIN: CPT | Performed by: FAMILY MEDICINE

## 2022-04-08 RX ORDER — CEPHALEXIN 500 MG/1
500 CAPSULE ORAL 2 TIMES DAILY
Qty: 14 CAPSULE | Refills: 0 | Status: SHIPPED | OUTPATIENT
Start: 2022-04-08 | End: 2022-04-15

## 2022-04-08 RX ORDER — CEPHALEXIN 500 MG/1
500 CAPSULE ORAL 2 TIMES DAILY
Qty: 14 CAPSULE | Refills: 0 | Status: SHIPPED | OUTPATIENT
Start: 2022-04-08 | End: 2022-04-08 | Stop reason: SDUPTHER

## 2022-04-08 NOTE — PROGRESS NOTES
"Chief Complaint  Urinary Tract Infection (PAIN IN KIDNEY AREA, LOWER BACK PAIN, PAINFUL AND BURNING URINATION)    Subjective          Antione Douglas presents to [facility]  Urinary Tract Infection   This is a new problem. The current episode started 1 to 4 weeks ago. The quality of the pain is described as aching and burning. There has been no fever. There is no history of pyelonephritis. Associated symptoms include frequency and urgency. Pertinent negatives include no hematuria, nausea or vomiting. He has tried increased fluids for the symptoms. The treatment provided no relief. There is no history of kidney stones.     The following portions of the patient's history were reviewed and updated as appropriate: allergies, current medications, past family history, past medical history, past social history, past surgical history and problem list.    Objective   Vital Signs:   /64   Pulse 104   Temp 97.3 °F (36.3 °C)   Resp 18   Ht 175.3 cm (69.02\")   Wt 90.3 kg (199 lb)   SpO2 98%   BMI 29.37 kg/m²     Physical Exam  Vitals and nursing note reviewed.   Constitutional:       General: He is not in acute distress.     Appearance: He is well-developed.   HENT:      Head: Normocephalic and atraumatic.      Right Ear: External ear normal.      Left Ear: External ear normal.   Eyes:      Conjunctiva/sclera: Conjunctivae normal.   Cardiovascular:      Rate and Rhythm: Normal rate and regular rhythm.   Pulmonary:      Effort: Pulmonary effort is normal.      Breath sounds: Normal breath sounds.   Abdominal:      Palpations: Abdomen is soft.      Tenderness: There is no right CVA tenderness or left CVA tenderness.   Musculoskeletal:      Comments: Ambulates with cane   Neurological:      Mental Status: He is alert and oriented to person, place, and time.   Psychiatric:         Behavior: Behavior normal.        Result Review :                 Assessment and Plan    Diagnoses and all " orders for this visit:    1. Suspected UTI (Primary)  -     Urine Culture - , Urine, Clean Catch  -     Discontinue: cephalexin (Keflex) 500 MG capsule; Take 1 capsule by mouth 2 (Two) Times a Day for 7 days.  Dispense: 14 capsule; Refill: 0  -     cephalexin (Keflex) 500 MG capsule; Take 1 capsule by mouth 2 (Two) Times a Day for 7 days.  Dispense: 14 capsule; Refill: 0    2. Dysuria  -     POC Urinalysis Dipstick  -     Urine Culture - , Urine, Clean Catch  -     Discontinue: cephalexin (Keflex) 500 MG capsule; Take 1 capsule by mouth 2 (Two) Times a Day for 7 days.  Dispense: 14 capsule; Refill: 0  -     cephalexin (Keflex) 500 MG capsule; Take 1 capsule by mouth 2 (Two) Times a Day for 7 days.  Dispense: 14 capsule; Refill: 0    UA normal except protein, but based on symptoms will culture and started abx  Go to ER if symptoms worsen      Follow Up   No follow-ups on file.  Patient was given instructions and counseling regarding his condition or for health maintenance advice. Please see specific information pulled into the AVS if appropriate.

## 2022-04-10 LAB
BACTERIA UR CULT: NO GROWTH
BACTERIA UR CULT: NORMAL

## 2022-04-14 ENCOUNTER — TELEPHONE (OUTPATIENT)
Dept: FAMILY MEDICINE CLINIC | Facility: CLINIC | Age: 77
End: 2022-04-14

## 2022-04-14 DIAGNOSIS — I10 ESSENTIAL HYPERTENSION: ICD-10-CM

## 2022-04-14 DIAGNOSIS — F41.9 ANXIETY: ICD-10-CM

## 2022-04-14 DIAGNOSIS — F41.1 GENERALIZED ANXIETY DISORDER: ICD-10-CM

## 2022-04-14 RX ORDER — DULOXETIN HYDROCHLORIDE 60 MG/1
60 CAPSULE, DELAYED RELEASE ORAL DAILY
Qty: 90 CAPSULE | Refills: 0 | Status: SHIPPED | OUTPATIENT
Start: 2022-04-14 | End: 2022-06-27

## 2022-04-14 RX ORDER — FUROSEMIDE 20 MG/1
TABLET ORAL
Qty: 270 TABLET | Refills: 0 | Status: SHIPPED | OUTPATIENT
Start: 2022-04-14 | End: 2022-06-27

## 2022-04-14 NOTE — TELEPHONE ENCOUNTER
Please call,     1. I had sent in for 180 pills to [facility] on 2/17/2022 + 1 refill. Did he get the refill?     2. [facility]     Rafa Gómez MD

## 2022-04-14 NOTE — TELEPHONE ENCOUNTER
It was sent to [facility] mail order. He would like it sent to [facility]. Working on PA at [facility]     Antione Douglas (Garcia: [id number]) - [id number]  ALPRAZolam 0.5MG tablets  Status: PA Request  Created: April 14th, 2022  Sent: April 14th, 2022

## 2022-04-14 NOTE — TELEPHONE ENCOUNTER
Caller: Maggie Douglas    Relationship: Emergency Contact    Best call back number:    What test/procedure/medication requested:  ALPRAZolam (XANAX) 0.5 MG tablet     When is it needed: ASAP    Where is the test/procedure going to be performed: N/A    Additional information or concerns: PATIENT ONLY HAS 3 DAYS REMAINING. HE NEEDS THIS MEDICATION FILLED ASAP HOWEVER A PRIOR AUTH IS REQUIRED PER INSURANCE. PATIENT IS REQUESTING A 90 DAY SUPPLY (STATED DR CASE WOULD SUBMIT AS A 90 DAY SUPPLY)    PLEASE SEND REQUEST TO THE FOLLOWING PHARMACY FOR THIS MONTH    [facility] [address] [phone] [facility] [phone] FX

## 2022-04-15 RX ORDER — ALPRAZOLAM 0.5 MG/1
TABLET ORAL
Qty: 360 TABLET | Refills: 1 | Status: SHIPPED | OUTPATIENT
Start: 2022-04-15 | End: 2022-07-12

## 2022-04-15 NOTE — TELEPHONE ENCOUNTER
PA Case: [id number], Status: Approved, Coverage Starts on: 1/1/2022 12:00:00 AM, Coverage Ends on: 12/31/2022 12:00:00 AM.    Pt wife wants to know if this is [facility] They have been getting it through [facility] pharmacy previously but [facility]  will be much faster for them due to he will be out.      *She called [facility] and there is no rx on fill or refill on file.

## 2022-04-15 NOTE — TELEPHONE ENCOUNTER
Please call.  Chrissy spoke with her yesterday and we must confirm exactly how many pills he is taking per day.  If he is truly taking six  0.5 mg pills per day, then we should change to the 1 mg dose so he does not have to get so many pills.    She was going to have him call us back so we can confirm this.  That is why I have not sent it to the pharmacy yet.

## 2022-04-19 ENCOUNTER — TELEPHONE (OUTPATIENT)
Dept: FAMILY MEDICINE CLINIC | Facility: CLINIC | Age: 77
End: 2022-04-19

## 2022-04-19 DIAGNOSIS — R10.9 FLANK PAIN: Primary | ICD-10-CM

## 2022-04-19 NOTE — TELEPHONE ENCOUNTER
Caller: Antione Douglas    Relationship: Self  Best call back number: [phone]    What is the medical concern/diagnosis: ISSUES WITH KIDNEYS    What specialty or service is being requested: CT SCAN    What is the provider, practice or medical service name:     What is the office location:     What is the office phone number:     Any additional details:

## 2022-04-19 NOTE — TELEPHONE ENCOUNTER
Please call.  I reviewed the chart and looks like after he saw me he saw Dr. Giraldo for a urinary tract infection possibly.    I would like to refer this to her to review but will not be until tomorrow.  She may be able to order this if appropriate.

## 2022-04-19 NOTE — TELEPHONE ENCOUNTER
Urine culture was normal, so UTI isn't present.   I'll place an order for CT scan to rule out possible kidney stone.

## 2022-04-19 NOTE — TELEPHONE ENCOUNTER
Called patient stated that he is having pain in both sides of back were kidneys are. Stated he had a xray done and thought maybe it would have caught a infection. He is still having a lot of pain.

## 2022-04-20 ENCOUNTER — HOSPITAL ENCOUNTER (OUTPATIENT)
Dept: CT IMAGING | Facility: HOSPITAL | Age: 77
Discharge: HOME | End: 2022-04-20
Admitting: FAMILY MEDICINE

## 2022-04-20 PROCEDURE — 74176 CT ABD & PELVIS W/O CONTRAST: CPT

## 2022-04-22 ENCOUNTER — TELEPHONE (OUTPATIENT)
Dept: FAMILY MEDICINE CLINIC | Facility: CLINIC | Age: 77
End: 2022-04-22

## 2022-04-22 DIAGNOSIS — K21.9 GASTROESOPHAGEAL REFLUX DISEASE, UNSPECIFIED WHETHER ESOPHAGITIS PRESENT: ICD-10-CM

## 2022-04-22 DIAGNOSIS — R05.3 PERSISTENT COUGH FOR 3 WEEKS OR LONGER: Primary | ICD-10-CM

## 2022-04-22 RX ORDER — CEPHALEXIN 500 MG/1
500 CAPSULE ORAL 2 TIMES DAILY
Qty: 14 CAPSULE | Refills: 0 | Status: SHIPPED | OUTPATIENT
Start: 2022-04-22 | End: 2022-05-19

## 2022-04-22 RX ORDER — OMEPRAZOLE 20 MG/1
20 CAPSULE, DELAYED RELEASE ORAL DAILY
Qty: 90 CAPSULE | Refills: 0 | Status: SHIPPED | OUTPATIENT
Start: 2022-04-22 | End: 2022-09-15

## 2022-04-22 NOTE — TELEPHONE ENCOUNTER
Pt called in requesting the results of his most recent ct. He also wanted to know if something could be called in for his cough and congestion. He can't seem to get rid of it. He has already been on 3 different antibiotics and steroids he stated. He is coughing up yellow mucus he stated.

## 2022-04-22 NOTE — TELEPHONE ENCOUNTER
Pt phoned office stating finished antibiotics but still has bad cough requesting cough medicine-stated didn't request refill on omeprazole

## 2022-04-22 NOTE — TELEPHONE ENCOUNTER
Caller: Maggie Douglas    Relationship to patient: Emergency Contact    Best call back number: [phone]     What is the call regarding:  PATIENT'S WIFE STATES THAT HIS UPPER RESPIRATORY INFECTION HAS COME BACK AND HE WANTED TO SEE IF HE COULD GET SOME MORE ANTIBIOTICS CALLED IN TO [facility].

## 2022-04-24 RX ORDER — BROMPHENIRAMINE MALEATE, PSEUDOEPHEDRINE HYDROCHLORIDE, AND DEXTROMETHORPHAN HYDROBROMIDE 2; 30; 10 MG/5ML; MG/5ML; MG/5ML
5 SYRUP ORAL 4 TIMES DAILY PRN
Qty: 180 ML | Refills: 0 | Status: SHIPPED | OUTPATIENT
Start: 2022-04-24 | End: 2022-05-19

## 2022-04-24 NOTE — TELEPHONE ENCOUNTER
See result note of CT scan  Cough syrup sent to pharmacy. He didn't complain of a cough when I saw him. Has he completed a chest Xray to evaluate?

## 2022-05-16 RX ORDER — TRIAMCINOLONE ACETONIDE 1 MG/G
CREAM TOPICAL
Qty: 45 G | Refills: 2 | Status: SHIPPED | OUTPATIENT
Start: 2022-05-16 | End: 2022-05-19

## 2022-05-19 ENCOUNTER — OFFICE VISIT (OUTPATIENT)
Dept: FAMILY MEDICINE CLINIC | Facility: CLINIC | Age: 77
End: 2022-05-19

## 2022-05-19 VITALS
SYSTOLIC BLOOD PRESSURE: 102 MMHG | HEART RATE: 86 BPM | BODY MASS INDEX: 28.88 KG/M2 | HEIGHT: 69 IN | WEIGHT: 195 LBS | RESPIRATION RATE: 18 BRPM | TEMPERATURE: 98.1 F | DIASTOLIC BLOOD PRESSURE: 68 MMHG

## 2022-05-19 DIAGNOSIS — Z12.5 PROSTATE CANCER SCREENING: ICD-10-CM

## 2022-05-19 DIAGNOSIS — J43.1 PANLOBULAR EMPHYSEMA: ICD-10-CM

## 2022-05-19 DIAGNOSIS — N28.9 RENAL INSUFFICIENCY: ICD-10-CM

## 2022-05-19 DIAGNOSIS — E11.43 DIABETIC AUTONOMIC NEUROPATHY ASSOCIATED WITH TYPE 2 DIABETES MELLITUS: Primary | ICD-10-CM

## 2022-05-19 DIAGNOSIS — I10 ESSENTIAL HYPERTENSION: ICD-10-CM

## 2022-05-19 PROCEDURE — 99214 OFFICE O/P EST MOD 30 MIN: CPT | Performed by: FAMILY MEDICINE

## 2022-05-19 NOTE — PROGRESS NOTES
Chief Complaint  Diabetes (3 month f/u)    Subjective          Antione Douglas presents to [facility]  History of Present Illness    Kidney function  The patient reports that he had a CT scan on his kidneys in 04/2021 by Dr. Giraldo. He states that he did not have any kidney stones. He states that it looks similar to the scan that he had in 2018. The patient reports that he has side pain on the left side around his belt line. He states that he has not had a colonoscopy. The patient reports that he called the number that was given in 12/2021. He states that he has not called them back since then. The patient reports that he checks his blood sugar at home. He states that his blood sugar is running around 130. The patient reports that he had one time when his blood sugar had 118. He states that it does not seem like it bothered him that bad. The patient reports that he still has neuropathy on his legs. He states that it is about the same. The patient reports that he has to have foot warm at night and seems like it seems like it get colder when he lays down. He states that he does stand while sitting up. The patient reports that his breathing is about stable. He states that he took 3 rounds of antibiotics and a steroid. The patient reports that he went to took a chest x-ray and they took an x-ray of his spine and about his lungs. He states that he needs the Spiriva refilled. The patient reports that he has not had trouble with his prostate before. He states that he can not smell or taste.    Diabetes.  He is due for blood work.  Medication is unchanged.  Blood sugars have been around 130    Emphysema.  Stable on Spiriva.  Stable shortness of breath.  Had episode of left-sided chest pain.  Seem to occur after holding his great-grandson.  No exertional chest pain reported    Review of Systems   Constitutional: Negative.    HENT: Negative.    Respiratory: Negative.    Cardiovascular: Negative.  "   Gastrointestinal: Negative.    Musculoskeletal: Positive for arthralgias.        Objective       Vital Signs:   /68   Pulse 86   Temp 98.1 °F (36.7 °C)   Resp 18   Ht 175.3 cm (69\")   Wt 88.5 kg (195 lb)   BMI 28.80 kg/m²     Physical Exam  Vitals and nursing note reviewed.   Constitutional:       Appearance: He is well-developed.   HENT:      Head: Normocephalic and atraumatic.      Right Ear: External ear normal.      Left Ear: External ear normal.   Eyes:      Pupils: Pupils are equal, round, and reactive to light.   Cardiovascular:      Rate and Rhythm: Normal rate and regular rhythm.      Heart sounds: Normal heart sounds.   Pulmonary:      Effort: Pulmonary effort is normal. No respiratory distress.      Breath sounds: Normal breath sounds. No wheezing or rales.   Abdominal:      Tenderness: There is no abdominal tenderness. There is no guarding or rebound.   Musculoskeletal:      Right lower leg: No edema.      Left lower leg: No edema.   Skin:     General: Skin is warm and dry.   Neurological:      Mental Status: He is alert and oriented to person, place, and time.   Psychiatric:         Behavior: Behavior normal.          Result Review :                     Assessment and Plan    Diagnoses and all orders for this visit:    1. Diabetic autonomic neuropathy associated with type 2 diabetes mellitus (HCC) (Primary)  -     Lipid Panel With / Chol / HDL Ratio  -     Hemoglobin A1c  -     CBC & Differential  -     Comprehensive Metabolic Panel    2. Renal insufficiency  -     CBC & Differential  -     Comprehensive Metabolic Panel    3. Panlobular emphysema (HCC)  -     tiotropium (SPIRIVA) 18 MCG per inhalation capsule; Place 1 capsule into inhaler and inhale Daily.  Dispense: 90 capsule; Refill: 3    4. Essential hypertension  -     Lipid Panel With / Chol / HDL Ratio  -     CBC & Differential    5. Prostate cancer screening  -     PSA Screen      1. Diabetes mellitus type 2 with probable " neuropathy  - Check blood work including CMP, CBC, A1c, and lipid panel.    2. Renal insufficiency  - Check CBC and CMP.    3. COPD  - Stable on Spiriva. This was refilled.    4. Hypertension  - Continue medication and check CBC and lipid panel.    5. Prostate cancer screening  - Check PSA.     He reports some left-sided chest wall pain, which started yesterday after holding his grandson. He will call if not improving.        DISCUSSION         Follow Up   Return in about 3 months (around 8/19/2022).    Patient was given instructions and counseling regarding his condition or for health maintenance advice. Please see specific information pulled into the AVS if appropriate.       Rafa Gómez MD     Transcribed from ambient dictation for Rafa Gómez MD by SHANE COLVIN.  05/19/22   16:49 EDT    Patient verbalized consent to the visit recording.  I have personally performed the services described in this document as transcribed by the above individual, and it is both accurate and complete.  Rafa Gómez MD  5/19/2022  18:34 EDT

## 2022-05-20 ENCOUNTER — TELEPHONE (OUTPATIENT)
Dept: FAMILY MEDICINE CLINIC | Facility: CLINIC | Age: 77
End: 2022-05-20

## 2022-05-20 LAB
ALBUMIN SERPL-MCNC: 4.2 G/DL (ref 3.7–4.7)
ALBUMIN/GLOB SERPL: 1.6 {RATIO} (ref 1.2–2.2)
ALP SERPL-CCNC: 109 IU/L (ref 44–121)
ALT SERPL-CCNC: 22 IU/L (ref 0–44)
AST SERPL-CCNC: 20 IU/L (ref 0–40)
BASOPHILS # BLD AUTO: 0.1 X10E3/UL (ref 0–0.2)
BASOPHILS NFR BLD AUTO: 1 %
BILIRUB SERPL-MCNC: 0.4 MG/DL (ref 0–1.2)
BUN SERPL-MCNC: 12 MG/DL (ref 8–27)
BUN/CREAT SERPL: 9 (ref 10–24)
CALCIUM SERPL-MCNC: 9.2 MG/DL (ref 8.6–10.2)
CHLORIDE SERPL-SCNC: 102 MMOL/L (ref 96–106)
CHOLEST SERPL-MCNC: 118 MG/DL (ref 100–199)
CHOLEST/HDLC SERPL: 3.2 RATIO (ref 0–5)
CO2 SERPL-SCNC: 23 MMOL/L (ref 20–29)
CREAT SERPL-MCNC: 1.32 MG/DL (ref 0.76–1.27)
EGFRCR SERPLBLD CKD-EPI 2021: 56 ML/MIN/1.73
EOSINOPHIL # BLD AUTO: 0.3 X10E3/UL (ref 0–0.4)
EOSINOPHIL NFR BLD AUTO: 3 %
ERYTHROCYTE [DISTWIDTH] IN BLOOD BY AUTOMATED COUNT: 14.5 % (ref 11.6–15.4)
GLOBULIN SER CALC-MCNC: 2.7 G/DL (ref 1.5–4.5)
GLUCOSE SERPL-MCNC: 152 MG/DL (ref 65–99)
HBA1C MFR BLD: 7.2 % (ref 4.8–5.6)
HCT VFR BLD AUTO: 46.7 % (ref 37.5–51)
HDLC SERPL-MCNC: 37 MG/DL
HGB BLD-MCNC: 15.8 G/DL (ref 13–17.7)
IMM GRANULOCYTES # BLD AUTO: 0.1 X10E3/UL (ref 0–0.1)
IMM GRANULOCYTES NFR BLD AUTO: 1 %
LDLC SERPL CALC-MCNC: 62 MG/DL (ref 0–99)
LYMPHOCYTES # BLD AUTO: 2.5 X10E3/UL (ref 0.7–3.1)
LYMPHOCYTES NFR BLD AUTO: 19 %
MCH RBC QN AUTO: 29.5 PG (ref 26.6–33)
MCHC RBC AUTO-ENTMCNC: 33.8 G/DL (ref 31.5–35.7)
MCV RBC AUTO: 87 FL (ref 79–97)
MONOCYTES # BLD AUTO: 0.9 X10E3/UL (ref 0.1–0.9)
MONOCYTES NFR BLD AUTO: 7 %
NEUTROPHILS # BLD AUTO: 9.1 X10E3/UL (ref 1.4–7)
NEUTROPHILS NFR BLD AUTO: 69 %
PLATELET # BLD AUTO: 297 X10E3/UL (ref 150–450)
POTASSIUM SERPL-SCNC: 3.9 MMOL/L (ref 3.5–5.2)
PROT SERPL-MCNC: 6.9 G/DL (ref 6–8.5)
PSA SERPL-MCNC: 4.7 NG/ML (ref 0–4)
RBC # BLD AUTO: 5.36 X10E6/UL (ref 4.14–5.8)
SODIUM SERPL-SCNC: 143 MMOL/L (ref 134–144)
TRIGL SERPL-MCNC: 98 MG/DL (ref 0–149)
VLDLC SERPL CALC-MCNC: 19 MG/DL (ref 5–40)
WBC # BLD AUTO: 13 X10E3/UL (ref 3.4–10.8)

## 2022-05-20 NOTE — TELEPHONE ENCOUNTER
Caller: Antione Douglas    Relationship: Self    Best call back number:[phone]     What was the call regarding: PATIENT CALLED STATING THAT HE IS NOT ABLE TO GET IN TO SEE DR LOZANO FOR A COLONOSCOPY UNTIL AUGUST 10.  PATIENT ASKED IF DR MCKENNA WANTED HIM TO GET IN SOONER WITH ANOTHER PROVIDER.    Do you require a callback: YES

## 2022-05-20 NOTE — TELEPHONE ENCOUNTER
Please call.  That is fine if this is just for screening.  If he is having stomach issues or bleeding?  Then we would need to have him see someone sooner.

## 2022-05-23 ENCOUNTER — TELEPHONE (OUTPATIENT)
Dept: FAMILY MEDICINE CLINIC | Facility: CLINIC | Age: 77
End: 2022-05-23

## 2022-05-23 NOTE — TELEPHONE ENCOUNTER
Caller: Antione Douglas    Relationship: Self    Best call back number: [phone]    Caller requesting test results:     What test was performed: LAB    When was the test performed: 5/19    Where was the test performed: OFFICE    Additional notes:

## 2022-05-24 ENCOUNTER — TELEPHONE (OUTPATIENT)
Dept: FAMILY MEDICINE CLINIC | Facility: CLINIC | Age: 77
End: 2022-05-24

## 2022-05-24 NOTE — TELEPHONE ENCOUNTER
Caller: Antione Douglas    Relationship: Self    Best call back number: [phone]     What is the best time to reach you: ANYTIME     Who are you requesting to speak with (clinical staff, provider,  specific staff member: CLINICAL STAFF     What was the call regarding: PATIENT IS CALLING TO ASK A QUESTION ABOUT ONE OF HIS LAB RESULTS. HE IS WANTING TO KNOW IF THERE IS A NUMBER FOR THE PSA.     Do you require a callback: YES

## 2022-06-03 ENCOUNTER — TELEPHONE (OUTPATIENT)
Dept: FAMILY MEDICINE CLINIC | Facility: CLINIC | Age: 77
End: 2022-06-03

## 2022-06-03 DIAGNOSIS — J43.1 PANLOBULAR EMPHYSEMA: Primary | ICD-10-CM

## 2022-06-03 NOTE — TELEPHONE ENCOUNTER
Caller: Maggie Douglas    Relationship: Emergency Contact    Best call back number: [phone]    Requested Prescriptions:  albuterol (PROVENTIL HFA;VENTOLIN HFA) 108 (90 BASE) MCG/ACT [facility]    [facility] [place] [place] [facility] [address] [place] [address] - [phone]  - [phone]     Additional details provided by patient: PATIENT WAS IN FOR A VISIT AND WAS TOLD WE WOULD MAIL SOME INHALER SAMPLES. HE IS STILL WAITING FOR THE SAMPLES. IF THEY HAVE NOT BEEN MAILED [facility] SAID SHE CAN COME BY AND PICK THEM UP. HE REALLY NEEDS THEM.    Does the patient have less than a 3 day supply:  [x] Yes  [] No    Edwige Walter, QUENTIN   06/03/22 10:58 EDT

## 2022-06-06 RX ORDER — BUDESONIDE AND FORMOTEROL FUMARATE DIHYDRATE 160; 4.5 UG/1; UG/1
2 AEROSOL RESPIRATORY (INHALATION)
Qty: 3 EACH | Refills: 1 | Status: SHIPPED | OUTPATIENT
Start: 2022-06-06 | End: 2022-06-10 | Stop reason: ALTCHOICE

## 2022-06-06 NOTE — TELEPHONE ENCOUNTER
Spoke with araseli. He needs the Symbicort inhaler. Informed her we do not have samples. She stated she will like a refill sent to [facility] pharmacy.

## 2022-06-09 DIAGNOSIS — E11.9 TYPE 2 DIABETES MELLITUS WITHOUT COMPLICATION, WITHOUT LONG-TERM CURRENT USE OF INSULIN: ICD-10-CM

## 2022-06-10 ENCOUNTER — OFFICE VISIT (OUTPATIENT)
Dept: FAMILY MEDICINE CLINIC | Facility: CLINIC | Age: 77
End: 2022-06-10

## 2022-06-10 VITALS
SYSTOLIC BLOOD PRESSURE: 120 MMHG | RESPIRATION RATE: 24 BRPM | HEIGHT: 69 IN | TEMPERATURE: 97.5 F | DIASTOLIC BLOOD PRESSURE: 80 MMHG | HEART RATE: 90 BPM | BODY MASS INDEX: 29.41 KG/M2 | WEIGHT: 198.6 LBS | OXYGEN SATURATION: 94 %

## 2022-06-10 DIAGNOSIS — J44.9 CHRONIC OBSTRUCTIVE PULMONARY DISEASE, UNSPECIFIED COPD TYPE: ICD-10-CM

## 2022-06-10 DIAGNOSIS — J20.9 ACUTE BRONCHITIS, UNSPECIFIED ORGANISM: ICD-10-CM

## 2022-06-10 DIAGNOSIS — E11.9 TYPE 2 DIABETES MELLITUS WITHOUT COMPLICATION, WITHOUT LONG-TERM CURRENT USE OF INSULIN: ICD-10-CM

## 2022-06-10 DIAGNOSIS — J44.1 COPD EXACERBATION: Primary | ICD-10-CM

## 2022-06-10 PROCEDURE — 99214 OFFICE O/P EST MOD 30 MIN: CPT | Performed by: FAMILY MEDICINE

## 2022-06-10 RX ORDER — DOXYCYCLINE HYCLATE 100 MG/1
100 CAPSULE ORAL 2 TIMES DAILY
Qty: 14 CAPSULE | Refills: 0 | Status: SHIPPED | OUTPATIENT
Start: 2022-06-10 | End: 2022-07-14

## 2022-06-10 NOTE — PROGRESS NOTES
"Chief Complaint   Patient presents with   • chest congestion & cough      Getting worse over the past few months. Pt thinks its related to his COPD       Subjective      Antione Douglas is a 77 y.o. who presents for increased cough and chest congestion with nocturnal wheezing and yellow sputum production.  He denies fevers.  He is using his albuterol inhaler 5-6 times per day.  While he has Symbicort and Spiriva on his medication list he is not using these regularly and may not be using the Spiriva at all    Objective   Vital Signs:  /80   Pulse 90   Temp 97.5 °F (36.4 °C)   Resp 24   Ht 175.3 cm (69.02\")   Wt 90.1 kg (198 lb 9.6 oz)   SpO2 94%   BMI 29.31 kg/m²         Physical Exam  Constitutional:       Appearance: Normal appearance.   HENT:      Head: Normocephalic and atraumatic.      Right Ear: Tympanic membrane and ear canal normal.      Left Ear: Tympanic membrane and ear canal normal.      Nose: Nose normal.      Mouth/Throat:      Mouth: Mucous membranes are moist.      Pharynx: Oropharynx is clear.   Eyes:      Conjunctiva/sclera: Conjunctivae normal.   Cardiovascular:      Rate and Rhythm: Normal rate and regular rhythm.      Heart sounds: Normal heart sounds. No murmur heard.  Pulmonary:      Effort: Pulmonary effort is normal. No respiratory distress.      Breath sounds: Rhonchi present.   Chest:      Chest wall: No tenderness.   Abdominal:      General: Abdomen is flat. Bowel sounds are normal. There is no distension.      Palpations: Abdomen is soft.      Tenderness: There is no abdominal tenderness.   Musculoskeletal:      Cervical back: Normal range of motion and neck supple. No tenderness.   Lymphadenopathy:      Cervical: No cervical adenopathy.   Skin:     General: Skin is warm and dry.   Neurological:      Mental Status: He is alert.   Psychiatric:         Mood and Affect: Mood normal.          Result Review                     Assessment and Plan  Diagnoses and all orders for this " visit:    1. COPD exacerbation (HCC) (Primary)  Comments:  Patient is having COPD exacerbation with acute bronchitis.  Doxy-100 twice daily.  Change controller to Trelegy    2. Acute bronchitis, unspecified organism  Comments:  Doxy-100 twice daily  Orders:  -     doxycycline (VIBRAMYCIN) 100 MG capsule; Take 1 capsule by mouth 2 (Two) Times a Day.  Dispense: 14 capsule; Refill: 0    3. Chronic obstructive pulmonary disease, unspecified COPD type (HCC)  Comments:  Changed to Trelegy as hopefully overall this will be cheaper.  Follow-up in 6 weeks.  Orders:  -     Fluticasone-Umeclidin-Vilant (Trelegy Ellipta) 100-62.5-25 MCG/INH inhaler; Inhale 1 puff Daily.  Dispense: 1 each; Refill: 11    4. Type 2 diabetes mellitus without complication, without long-term current use of insulin (HCC)  Comments:  Try to avoid steroid to reduce hyperglycemia            Follow Up  Return in about 6 weeks (around 7/22/2022) for Recheck.  Patient was given instructions and counseling regarding his condition or for health maintenance advice. Please see specific information pulled into the AVS if appropriate.

## 2022-06-20 ENCOUNTER — TELEPHONE (OUTPATIENT)
Dept: FAMILY MEDICINE CLINIC | Facility: CLINIC | Age: 77
End: 2022-06-20

## 2022-06-20 NOTE — TELEPHONE ENCOUNTER
Caller: DouglasMaggie    Relationship: Emergency Contact    Best call back number: [phone]     Requested Prescriptions:   Requested Prescriptions      No prescriptions requested or ordered in this encounter    Fluticasone-Umeclidin-Vilant (Trelegy Ellipta) 100-62.5-25 MCG/INH inhaler    Pharmacy where request should be sent: [facility] [address] [phone]  - [phone]      Additional details provided by patient:  THE INSURANCE WILL NEED A PRIOR AUTHORIZATION       Does the patient have less than a 3 day supply:  [x] Yes  [] No    Javier Quiñones Rep   06/20/22 14:01 EDT

## 2022-06-21 NOTE — TELEPHONE ENCOUNTER
Called pharmacy. Patient does not need a PA they are in the coverage gap right now where co pay in a little higher. 166 for one month. Called valeriy marx.

## 2022-06-23 DIAGNOSIS — J30.2 SEASONAL ALLERGIES: ICD-10-CM

## 2022-06-24 RX ORDER — LORATADINE 10 MG/1
TABLET ORAL
Qty: 90 TABLET | Refills: 1 | Status: SHIPPED | OUTPATIENT
Start: 2022-06-24 | End: 2022-10-20

## 2022-06-27 DIAGNOSIS — F41.9 ANXIETY: ICD-10-CM

## 2022-06-27 DIAGNOSIS — I10 ESSENTIAL HYPERTENSION: ICD-10-CM

## 2022-06-27 RX ORDER — DULOXETIN HYDROCHLORIDE 60 MG/1
CAPSULE, DELAYED RELEASE ORAL
Qty: 90 CAPSULE | Refills: 0 | Status: SHIPPED | OUTPATIENT
Start: 2022-06-27 | End: 2022-08-21 | Stop reason: SDUPTHER

## 2022-06-27 RX ORDER — FUROSEMIDE 20 MG/1
TABLET ORAL
Qty: 270 TABLET | Refills: 0 | Status: SHIPPED | OUTPATIENT
Start: 2022-06-27 | End: 2022-08-21 | Stop reason: SDUPTHER

## 2022-07-11 DIAGNOSIS — F41.1 GENERALIZED ANXIETY DISORDER: ICD-10-CM

## 2022-07-12 RX ORDER — ALPRAZOLAM 0.5 MG/1
TABLET ORAL
Qty: 180 TABLET | Refills: 1 | Status: SHIPPED | OUTPATIENT
Start: 2022-07-12 | End: 2022-12-05

## 2022-07-14 ENCOUNTER — OFFICE VISIT (OUTPATIENT)
Dept: FAMILY MEDICINE CLINIC | Facility: CLINIC | Age: 77
End: 2022-07-14

## 2022-07-14 ENCOUNTER — HOSPITAL ENCOUNTER (OUTPATIENT)
Dept: GENERAL RADIOLOGY | Facility: HOSPITAL | Age: 77
Discharge: HOME | End: 2022-07-14
Admitting: FAMILY MEDICINE

## 2022-07-14 VITALS
WEIGHT: 201 LBS | OXYGEN SATURATION: 97 % | HEIGHT: 69 IN | TEMPERATURE: 96.9 F | DIASTOLIC BLOOD PRESSURE: 76 MMHG | RESPIRATION RATE: 18 BRPM | BODY MASS INDEX: 29.77 KG/M2 | SYSTOLIC BLOOD PRESSURE: 122 MMHG | HEART RATE: 106 BPM
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
## 2022-07-14 DIAGNOSIS — L30.9 DERMATITIS: ICD-10-CM

## 2022-07-14 DIAGNOSIS — M89.9 PUBIC BONE PAIN: ICD-10-CM

## 2022-07-14 DIAGNOSIS — R10.32 LEFT GROIN PAIN: Primary | ICD-10-CM

## 2022-07-14 DIAGNOSIS — R10.2 PELVIC PAIN: ICD-10-CM

## 2022-07-14 DIAGNOSIS — R10.32 LEFT GROIN PAIN: ICD-10-CM

## 2022-07-14 PROCEDURE — 99213 OFFICE O/P EST LOW 20 MIN: CPT | Performed by: FAMILY MEDICINE

## 2022-07-14 PROCEDURE — 72170 X-RAY EXAM OF PELVIS: CPT

## 2022-07-14 NOTE — PROGRESS NOTES
"Chief Complaint   Patient presents with   • Fall   • Groin Injury     Fell on cane       Subjective      Antione Douglas is a 77 y.o. who presents for a  fall 1 week ago.  Patient lost his balance while stepping down from an elevated position and got tangled in his cane.  This resulted in forced abduction of the left leg resulting in acute onset of groin pain.  Patient mitts pain is better today than it was a week ago.  He has a lot of difficulty lifting the left leg.    Patient also reports that he dry itchy skin behind his right ear has not responded to Kenalog    In brief follow-up of his last visit patient ports improvement in dyspnea with use of Trelegy.    Objective   Vital Signs:  /76   Pulse 106   Temp 96.9 °F (36.1 °C)   Resp 18   Ht 175.3 cm (69.02\")   Wt 91.2 kg (201 lb)   SpO2 97%   BMI 29.67 kg/m²     BMI is >= 25 and <30. (Overweight) The following options were offered after discussion;: none (medical contraindication)        Physical Exam  Constitutional:       Appearance: Normal appearance.   Musculoskeletal:        Legs:       Comments: Intact active range of motion with hip flexion and knee extension.  Intact active range of motion with hip abduction and abduction.  Skin is without bruising.  Mild weakness of hip flexion   Skin:     Comments: Faint erythema posterior to the left ear   Neurological:      Mental Status: He is alert.          Result Review                     Assessment and Plan  Diagnoses and all orders for this visit:    1. Left groin pain (Primary)  Comments:  X-ray ordered to rule out avulsion fractures about either the pubic symphysis or the anterior superior iliac spine.  Orders:  -     XR Pelvis 1 or 2 View; Future    2. Fall, initial encounter  -     XR Pelvis 1 or 2 View; Future    3. Pubic bone pain  -     XR Pelvis 1 or 2 View; Future    4. Pelvic pain  -     XR Pelvis 1 or 2 View; Future    5. Dermatitis  -     Discontinue: betamethasone valerate (VALISONE) 0.1 " % ointment; Apply 1 application topically to the appropriate area as directed 2 (Two) Times a Day.  Dispense: 15 g; Refill: 0  -     betamethasone valerate (VALISONE) 0.1 % ointment; Apply 1 application topically to the appropriate area as directed 2 (Two) Times a Day.  Dispense: 15 g; Refill: 0            Follow Up  Return if symptoms worsen or fail to improve.  Patient was given instructions and counseling regarding his condition or for health maintenance advice. Please see specific information pulled into the AVS if appropriate.

## 2022-07-27 ENCOUNTER — TELEPHONE (OUTPATIENT)
Dept: FAMILY MEDICINE CLINIC | Facility: CLINIC | Age: 77
End: 2022-07-27

## 2022-07-27 NOTE — TELEPHONE ENCOUNTER
Informed pt that I am not sure who called him. He said, he thought it was about an appt. I informed him he is still scheduled w/ Dr. Gómez in August. He said, he would call back tomorrow to reschedule this with Dr. Luther. He is going to change his PCP

## 2022-07-27 NOTE — TELEPHONE ENCOUNTER
Caller: Antione Douglas    Relationship: Self    Best call back number: [phone]    What is the best time to reach you: ANY  Who are you requesting to speak with (clinical staff, provider,  specific staff member): CLINICAL     Do you know the name of the person who called: PATIENT MISSED CALL FROM 7/26/22. TRIED TO WARM TRANSFER, NO ANSWER.  PATIENT DOES NOT KNOW WHO CALLED OR WHAT IT WAS ABOUT.  DO NOT SEE TELEPHONE ENCOUNTER    Do you require a callback: YES

## 2022-07-29 ENCOUNTER — TELEPHONE (OUTPATIENT)
Dept: FAMILY MEDICINE CLINIC | Facility: CLINIC | Age: 77
End: 2022-07-29

## 2022-07-29 NOTE — TELEPHONE ENCOUNTER
Caller: Maggie Douglas    Relationship: Emergency Contact    Best call back number: [phone]    Who is your current provider: LEONA MCKENNA    Who would you like your new provider to be: JUAN MACK    What are your reasons for transferring care: PATIENT WOULD LIKE TO TRANSFER CARE AND RESCHEDULE HIS MWV WITH NEW PROVIDER.    Additional notes:

## 2022-08-19 ENCOUNTER — OFFICE VISIT (OUTPATIENT)
Dept: FAMILY MEDICINE CLINIC | Facility: CLINIC | Age: 77
End: 2022-08-19

## 2022-08-19 VITALS
RESPIRATION RATE: 20 BRPM | DIASTOLIC BLOOD PRESSURE: 60 MMHG | OXYGEN SATURATION: 94 % | HEIGHT: 69 IN | BODY MASS INDEX: 29.77 KG/M2 | WEIGHT: 201 LBS | TEMPERATURE: 97.3 F | SYSTOLIC BLOOD PRESSURE: 120 MMHG | HEART RATE: 52 BPM

## 2022-08-19 DIAGNOSIS — E11.65 TYPE 2 DIABETES MELLITUS WITH HYPERGLYCEMIA, WITHOUT LONG-TERM CURRENT USE OF INSULIN: ICD-10-CM

## 2022-08-19 DIAGNOSIS — M54.41 CHRONIC BILATERAL LOW BACK PAIN WITH BILATERAL SCIATICA: ICD-10-CM

## 2022-08-19 DIAGNOSIS — E78.2 MIXED HYPERLIPIDEMIA: ICD-10-CM

## 2022-08-19 DIAGNOSIS — N18.31 STAGE 3A CHRONIC KIDNEY DISEASE: ICD-10-CM

## 2022-08-19 DIAGNOSIS — N39.41 URGE INCONTINENCE: ICD-10-CM

## 2022-08-19 DIAGNOSIS — G47.33 OBSTRUCTIVE SLEEP APNEA: ICD-10-CM

## 2022-08-19 DIAGNOSIS — G31.84 MILD COGNITIVE IMPAIRMENT WITH MEMORY LOSS: ICD-10-CM

## 2022-08-19 DIAGNOSIS — Z00.01 ANNUAL VISIT FOR GENERAL ADULT MEDICAL EXAMINATION WITH ABNORMAL FINDINGS: Primary | ICD-10-CM

## 2022-08-19 DIAGNOSIS — I10 ESSENTIAL HYPERTENSION: ICD-10-CM

## 2022-08-19 DIAGNOSIS — Z13.31 DEPRESSION SCREEN: ICD-10-CM

## 2022-08-19 DIAGNOSIS — F41.9 ANXIETY: ICD-10-CM

## 2022-08-19 DIAGNOSIS — M54.42 CHRONIC BILATERAL LOW BACK PAIN WITH BILATERAL SCIATICA: ICD-10-CM

## 2022-08-19 DIAGNOSIS — Z00.00 ENCOUNTER FOR SUBSEQUENT ANNUAL WELLNESS VISIT (AWV) IN MEDICARE PATIENT: ICD-10-CM

## 2022-08-19 DIAGNOSIS — N40.1 BENIGN PROSTATIC HYPERPLASIA WITH URINARY HESITANCY: ICD-10-CM

## 2022-08-19 DIAGNOSIS — R39.11 BENIGN PROSTATIC HYPERPLASIA WITH URINARY HESITANCY: ICD-10-CM

## 2022-08-19 DIAGNOSIS — G89.29 CHRONIC BILATERAL LOW BACK PAIN WITH BILATERAL SCIATICA: ICD-10-CM

## 2022-08-19 LAB
EXPIRATION DATE: ABNORMAL
HBA1C MFR BLD: 7.4 %
Lab: ABNORMAL
POC CREATININE URINE: ABNORMAL
POC MICROALBUMIN URINE: ABNORMAL

## 2022-08-19 PROCEDURE — 1170F FXNL STATUS ASSESSED: CPT | Performed by: FAMILY MEDICINE

## 2022-08-19 PROCEDURE — 99214 OFFICE O/P EST MOD 30 MIN: CPT | Performed by: FAMILY MEDICINE

## 2022-08-19 PROCEDURE — 1159F MED LIST DOCD IN RCRD: CPT | Performed by: FAMILY MEDICINE

## 2022-08-19 PROCEDURE — 82044 UR ALBUMIN SEMIQUANTITATIVE: CPT | Performed by: FAMILY MEDICINE

## 2022-08-19 PROCEDURE — 96160 PT-FOCUSED HLTH RISK ASSMT: CPT | Performed by: FAMILY MEDICINE

## 2022-08-19 PROCEDURE — 3051F HG A1C>EQUAL 7.0%<8.0%: CPT | Performed by: FAMILY MEDICINE

## 2022-08-19 PROCEDURE — G0444 DEPRESSION SCREEN ANNUAL: HCPCS | Performed by: FAMILY MEDICINE

## 2022-08-19 PROCEDURE — 1126F AMNT PAIN NOTED NONE PRSNT: CPT | Performed by: FAMILY MEDICINE

## 2022-08-19 PROCEDURE — 83036 HEMOGLOBIN GLYCOSYLATED A1C: CPT | Performed by: FAMILY MEDICINE

## 2022-08-19 PROCEDURE — G0439 PPPS, SUBSEQ VISIT: HCPCS | Performed by: FAMILY MEDICINE

## 2022-08-19 PROCEDURE — 99397 PER PM REEVAL EST PAT 65+ YR: CPT | Performed by: FAMILY MEDICINE

## 2022-08-19 PROCEDURE — 3008F BODY MASS INDEX DOCD: CPT | Performed by: FAMILY MEDICINE

## 2022-08-19 NOTE — PROGRESS NOTES
The ABCs of the Annual Wellness Visit  Subsequent Medicare Wellness Visit    Chief Complaint   Patient presents with   • Medicare Wellness-subsequent   • RF: [employer] (mail order company)       Subjective    History of Present Illness:  Antione Douglas is a 77 y.o. male who presents for a Subsequent Medicare Wellness Visit.    The following portions of the patient's history were reviewed and   updated as appropriate: allergies, current medications, past family history, past medical history, past social history, past surgical history and problem list.    Compared to one year ago, the patient feels his physical   health is the same.    Compared to one year ago, the patient feels his mental   health is the same. Notes memory problesm    Recent Hospitalizations:  He was not admitted to the hospital during the last year.       Current Medical Providers:  Patient Care Team:  Haseeb Luther MD as PCP - General (Family Medicine)    Outpatient Medications Prior to Visit   Medication Sig Dispense Refill   • ALPRAZolam (XANAX) 0.5 MG tablet TAKE 1 TO 2 TABLETS THREE TIMES DAILY AS NEEDED FOR ANXIETY 180 tablet 1   • betamethasone valerate (VALISONE) 0.1 % ointment Apply 1 application topically to the appropriate area as directed 2 (Two) Times a Day. 15 g 0   • Fluticasone-Umeclidin-Vilant (Trelegy Ellipta) 100-62.5-25 MCG/INH inhaler Inhale 1 puff Daily. 1 each 11   • glucose blood (BRODERICK CONTOUR TEST) test strip Test once daily  DX E11.8 100 each 1   • loratadine (CLARITIN) 10 MG tablet TAKE 1 TABLET EVERY DAY 90 tablet 1   • omeprazole (priLOSEC) 20 MG capsule Take 1 capsule by mouth Daily. 90 capsule 0   • atorvastatin (LIPITOR) 10 MG tablet Take 1 tablet by mouth Daily. 90 tablet 1   • DULoxetine (CYMBALTA) 60 MG capsule TAKE 1 CAPSULE EVERY DAY 90 capsule 0   • furosemide (LASIX) 20 MG tablet TAKE TWO TABLETS EVERY MORNING AND TAKE ONE TABLET EVERY EVENING 270 tablet 0   • SITagliptin (Januvia) 100 MG tablet Take 1  tablet by mouth Daily. 90 tablet 0   • albuterol (PROVENTIL HFA;VENTOLIN HFA) 108 (90 BASE) MCG/ACT inhaler Inhale 2 puffs Every 6 (Six) Hours As Needed. ProAir  (90 Base) MCG/ACT Inhalation Aerosol Solution; Patient Sig: ProAir  (90 Base) MCG/ACT Inhalation Aerosol Solution ; 0; 08-Aug-2013; Active       No facility-administered medications prior to visit.       No opioid medication identified on active medication list. I have reviewed chart for other potential  high risk medication/s and harmful drug interactions in the elderly.          Aspirin is not on active medication list.  Aspirin use is not indicated based on review of current medical condition/s. Risk of harm outweighs potential benefits.  .    Patient Active Problem List   Diagnosis   • Chronic obstructive pulmonary disease (HCC)   • Generalized anxiety disorder   • Essential hypertension   • Primary insomnia   • Arthralgia of hip   • Chronic bilateral low back pain   • Cobalamin deficiency   • Asymptomatic PVCs   • Chronic idiopathic constipation   • Stage 3a chronic kidney disease ([facility])   • Diabetic peripheral neuropathy ([facility])   • Benign prostatic hyperplasia with urinary hesitancy   • Peripheral vascular disease of foot ([facility])   • Mixed hyperlipidemia   • DDD (degenerative disc disease), cervical   • History of fusion of cervical spine   • Seasonal allergies   • Diabetic autonomic neuropathy associated with type 2 diabetes mellitus ([facility])   • Obstructive sleep apnea   • Mild cognitive impairment with memory loss     Advance Care Planning  Advance Directive is on file.  ACP discussion was held with the patient during this visit. Patient has an advance directive in EMR which is still valid.     Review of Systems   Constitutional: Negative.    HENT: Negative.    Eyes: Negative.    Respiratory: Negative.    Cardiovascular: Negative.    Gastrointestinal: Positive for constipation.   Endocrine: Negative.    Genitourinary: Positive for decreased  "urine volume, difficulty urinating, frequency and urgency.   Musculoskeletal: Positive for arthralgias, back pain, myalgias, neck pain and neck stiffness.   Skin: Negative.    Allergic/Immunologic: Negative.    Neurological: Negative.    Hematological: Negative.    Psychiatric/Behavioral: Positive for sleep disturbance.        Objective    Vitals:    08/19/22 1542   BP: 120/60   Pulse: 52   Resp: 20   Temp: 97.3 °F (36.3 °C)   SpO2: 94%   Weight: 91.2 kg (201 lb)   Height: 175.3 cm (69.02\")   PainSc: 0-No pain     Estimated body mass index is 29.67 kg/m² as calculated from the following:    Height as of this encounter: 175.3 cm (69.02\").    Weight as of this encounter: 91.2 kg (201 lb).    BMI is >= 25 and <30. (Overweight) The following options were offered after discussion;: nutrition counseling/recommendations      Does the patient have evidence of cognitive impairment? Yes    Physical Exam  Constitutional:       General: He is not in acute distress.     Appearance: Normal appearance. He is not ill-appearing.   HENT:      Head: Normocephalic and atraumatic.      Right Ear: Tympanic membrane and ear canal normal.      Left Ear: Tympanic membrane and ear canal normal.      Nose: Nose normal.      Mouth/Throat:      Mouth: Mucous membranes are dry.      Pharynx: Oropharynx is clear. No posterior oropharyngeal erythema.   Eyes:      Extraocular Movements: Extraocular movements intact.      Conjunctiva/sclera: Conjunctivae normal.      Pupils: Pupils are equal, round, and reactive to light.   Cardiovascular:      Rate and Rhythm: Bradycardia present. Rhythm irregular.      Pulses:           Dorsalis pedis pulses are 1+ on the right side and 1+ on the left side.        Posterior tibial pulses are 1+ on the right side and 1+ on the left side.      Heart sounds: Normal heart sounds. No murmur heard.  Pulmonary:      Effort: Pulmonary effort is normal. No respiratory distress.      Breath sounds: Decreased air movement " present. Decreased breath sounds present.   Abdominal:      General: Abdomen is flat. Bowel sounds are normal.      Palpations: Abdomen is soft.      Tenderness: There is no abdominal tenderness.   Musculoskeletal:         General: Normal range of motion.      Cervical back: Normal range of motion and neck supple.   Feet:      Right foot:      Skin integrity: Skin integrity normal.      Left foot:      Skin integrity: Skin integrity normal.   Lymphadenopathy:      Cervical: No cervical adenopathy.   Skin:     General: Skin is warm and dry.      Capillary Refill: Capillary refill takes less than 2 seconds.   Neurological:      General: No focal deficit present.      Mental Status: He is alert and oriented to person, place, and time. Mental status is at baseline.      Cranial Nerves: No cranial nerve deficit.      Sensory: No sensory deficit.      Motor: No weakness.   Psychiatric:         Mood and Affect: Mood normal.         Behavior: Behavior normal.         Thought Content: Thought content normal.         Judgment: Judgment normal.       Mini cog score-4.  Could not remember 1 word (chair).  Normal clock drawing.    Lab Results   Component Value Date    HGBA1C 7.4 08/19/2022     Microalbumin Abnormal         HEALTH RISK ASSESSMENT    Smoking Status:  Social History     Tobacco Use   Smoking Status Former Smoker   • Packs/day: 2.00   • Years: 50.00   • Pack years: 100.00   • Quit date: 2007   • Years since quitting: 15.6   Smokeless Tobacco Never Used   Tobacco Comment    quit smoking in 2007     Alcohol Consumption:  Social History     Substance and Sexual Activity   Alcohol Use No     Fall Risk Screen:    STEADI Fall Risk Assessment was completed, and patient is at LOW risk for falls.Assessment completed on:8/19/2022    Depression Screening:  PHQ-2/PHQ-9 Depression Screening 8/19/2022   Retired Total Score -   Little Interest or Pleasure in Doing Things 2-->more than half the days   Feeling Down, Depressed or  Hopeless 1-->several days   Trouble Falling or Staying Asleep, or Sleeping Too Much 3-->nearly every day   Feeling Tired or Having Little Energy 3-->nearly every day   Poor Appetite or Overeating 1-->several days   Feeling Bad about Yourself - or that You are a Failure or Have Let Yourself or Your Family Down 0-->not at all   Trouble Concentrating on Things, Such as Reading the Newspaper or Watching Television 0-->not at all   Moving or Speaking So Slowly that Other People Could Have Noticed? Or the Opposite - Being So Fidgety 0-->not at all   Thoughts that You Would be Better Off Dead or of Hurting Yourself in Some Way 0-->not at all   PHQ-9: Brief Depression Severity Measure Score 10   If You Checked Off Any Problems, How Difficult Have These Problems Made It For You to Do Your Work, Take Care of Things at Home, or Get Along with Other People? somewhat difficult       Health Habits and Functional and Cognitive Screening:  Functional & Cognitive Status 8/19/2022   Do you have difficulty preparing food and eating? No   Do you have difficulty bathing yourself, getting dressed or grooming yourself? No   Do you have difficulty using the toilet? No   Do you have difficulty moving around from place to place? Yes   Do you have trouble with steps or getting out of a bed or a chair? Yes   Current Diet Well Balanced Diet   Dental Exam Up to date   Eye Exam Up to date   Exercise (times per week) 0 times per week   Current Exercises Include No Regular Exercise   Current Exercise Activities Include -   Do you need help using the phone?  No   Are you deaf or do you have serious difficulty hearing?  No   Do you need help with transportation? Yes   Do you need help shopping? Yes   Do you need help preparing meals?  Yes   Do you need help with housework?  Yes   Do you need help with laundry? Yes   Do you need help taking your medications? No   Do you need help managing money? No   Do you ever drive or ride in a car without wearing  a seat belt? No   Have you felt unusual stress, anger or loneliness in the last month? Yes   Who do you live with? Spouse   If you need help, do you have trouble finding someone available to you? No   Have you been bothered in the last four weeks by sexual problems? Yes   Do you have difficulty concentrating, remembering or making decisions? Yes       Age-appropriate Screening Schedule:  Refer to the list below for future screening recommendations based on patient's age, sex and/or medical conditions. Orders for these recommended tests are listed in the plan section. The patient has been provided with a written plan.    Health Maintenance   Topic Date Due   • TDAP/TD VACCINES (1 - Tdap) Never done   • ZOSTER VACCINE (1 of 2) Never done   • INFLUENZA VACCINE  10/01/2022   • DIABETIC EYE EXAM  10/29/2022   • HEMOGLOBIN A1C  02/19/2023   • LIPID PANEL  05/19/2023   • URINE MICROALBUMIN  08/19/2023              Assessment & Plan   CMS Preventative Services Quick Reference  Risk Factors Identified During Encounter  Chronic Pain   Dementia/Memory   Depression/Dysphoria  Inactivity/Sedentary  Obesity/Overweight   The above risks/problems have been discussed with the patient.  Follow up actions/plans if indicated are seen below in the Assessment/Plan Section.  Pertinent information has been shared with the patient in the After Visit Summary.    Diagnoses and all orders for this visit:    1. Annual visit for general adult medical examination with abnormal findings (Primary)    2. Encounter for subsequent annual wellness visit (AWV) in Medicare patient    3. Type 2 diabetes mellitus with hyperglycemia, without long-term current use of insulin (HCC)  Comments:  Add Jardiance 10 mg for DM with CKD stage IIIa  Orders:  -     POC Glycosylated Hemoglobin (Hb A1C)  -     POC Microalbumin  -     empagliflozin (Jardiance) 10 MG tablet tablet; Take 1 tablet by mouth Daily.  Dispense: 90 tablet; Refill: 0    4. Chronic bilateral low  back pain with bilateral sciatica  Comments:  Continue duloxetine 60 mg daily    5. Stage 3a chronic kidney disease (HCC)  Comments:  Begin Jardiance.  Repeat microalbumin and kidney function in 3 months  Orders:  -     empagliflozin (Jardiance) 10 MG tablet tablet; Take 1 tablet by mouth Daily.  Dispense: 90 tablet; Refill: 0    6. Urge incontinence  Comments:  Given samples of Myrbetriq 25 mg    7. Mild cognitive impairment with memory loss  Comments:  Mini cog score-4/5.  Will investigate nocturnal hypoxia due to perceived worsening of memory    8. Benign prostatic hyperplasia with urinary hesitancy    9. Depression screen  Comments:  Abnormal PHQ-9.  Continue duloxetine    10. Obstructive sleep apnea  Comments:  Obtain sleep study results from [facility]    11. Mixed hyperlipidemia  -     atorvastatin (LIPITOR) 10 MG tablet; Take 1 tablet by mouth Daily.  Dispense: 90 tablet; Refill: 3    12. Anxiety  -     DULoxetine (CYMBALTA) 60 MG capsule; Take 1 capsule by mouth Daily.  Dispense: 90 capsule; Refill: 3    13. Essential hypertension  Comments:  Adequate control-no changes.  Orders:  -     furosemide (LASIX) 20 MG tablet; Take 2 tablets in a.m. and 1 tablet in afternoon  Dispense: 270 tablet; Refill: 1    Other orders  -     SITagliptin (Januvia) 100 MG tablet; Take 1 tablet by mouth Daily.  Dispense: 90 tablet; Refill: 3  -     Mirabegron ER (Myrbetriq) 25 MG tablet sustained-release 24 hour 24 hr tablet; Take 1 tablet by mouth Daily.  Dispense: 30 tablet; Refill: 1    Personalized Care Plan:   1. Flu Vaccine in the fall  2. Complete Cologuard for CRC screen  3. Add Jardiance for CKD/DM and reassess glucose control in 3 months  4. Reassess MCI every 6 months    Follow Up:   Return in about 3 months (around 11/19/2022) for Recheck A1c, CKD, MCI, COPD.     An After Visit Summary and PPPS were made available to the patient.

## 2022-08-21 RX ORDER — ATORVASTATIN CALCIUM 10 MG/1
10 TABLET, FILM COATED ORAL DAILY
Qty: 90 TABLET | Refills: 3 | Status: SHIPPED | OUTPATIENT
Start: 2022-08-21

## 2022-08-21 RX ORDER — DULOXETIN HYDROCHLORIDE 60 MG/1
60 CAPSULE, DELAYED RELEASE ORAL DAILY
Qty: 90 CAPSULE | Refills: 3 | Status: SHIPPED | OUTPATIENT
Start: 2022-08-21

## 2022-08-21 RX ORDER — FUROSEMIDE 20 MG/1
TABLET ORAL
Qty: 270 TABLET | Refills: 1 | Status: SHIPPED | OUTPATIENT
Start: 2022-08-21 | End: 2022-12-05

## 2022-08-25 ENCOUNTER — TELEPHONE (OUTPATIENT)
Dept: FAMILY MEDICINE CLINIC | Facility: CLINIC | Age: 77
End: 2022-08-25

## 2022-08-25 NOTE — TELEPHONE ENCOUNTER
Caller: Antione Douglas    Relationship: Self    Best call back number: [phone]    What was the call regarding: PATIENT IS STATING HE KEEPS RECEIVING MESSAGES ABOUT THE LAB    Do you require a callback: YES

## 2022-08-25 NOTE — TELEPHONE ENCOUNTER
LM informing pt that we did not do any labs except the test I performed at the time of the visit.

## 2022-08-26 NOTE — TELEPHONE ENCOUNTER
Informed pt, I am not sure who had try to reach him or what results they would have been calling about.

## 2022-09-15 DIAGNOSIS — K21.9 GASTROESOPHAGEAL REFLUX DISEASE, UNSPECIFIED WHETHER ESOPHAGITIS PRESENT: ICD-10-CM

## 2022-09-15 RX ORDER — OMEPRAZOLE 20 MG/1
CAPSULE, DELAYED RELEASE ORAL
Qty: 90 CAPSULE | Refills: 0 | Status: SHIPPED | OUTPATIENT
Start: 2022-09-15 | End: 2023-01-03

## 2022-09-21 ENCOUNTER — TELEPHONE (OUTPATIENT)
Dept: FAMILY MEDICINE CLINIC | Facility: CLINIC | Age: 77
End: 2022-09-21

## 2022-09-21 NOTE — TELEPHONE ENCOUNTER
Called patient stated he received at call. Nothing noted in the chart. Informed pt sorry but did not see any reason why he was called.

## 2022-09-21 NOTE — TELEPHONE ENCOUNTER
Caller: Antione Douglas    Relationship: Self    Best call back number: [phone]    What is the best time to reach you: ANYTIME    Who are you requesting to speak with (clinical staff, provider,  specific staff member):CLINICAL STAFF     Do you know the name of the person who called: DOES NOT KNOW    What was the call regarding: NOT SURE

## 2022-10-14 ENCOUNTER — TELEPHONE (OUTPATIENT)
Dept: FAMILY MEDICINE CLINIC | Facility: CLINIC | Age: 77
End: 2022-10-14

## 2022-10-14 NOTE — TELEPHONE ENCOUNTER
Caller: Antione Douglas     Relationship: SELF    Best call back number: [phone] OK TO LEAVE DETAILED MESSAGE IF NO ANSWER 10/14/22 HUB    What is your medical concern? YELLOW WITH BLOOD WHEN BLOWING NOSE, SORE THROAT, ROOF OF MOUTH ITCHES VERY BAD, COUGHING UP 'LUNG' WHITE SOMETIMES YELLOW, NASAL CONGESTION, SHORT OF AIR, WHEEZES A LOT, LOST TASTE AND SMELL    How long has this issue been going on? A WEEK LAST Thursday 10/06/22    Is your provider already aware of this issue? NO    Have you been treated for this issue? YES    ASKING FOR MEDICATION TO BE CALLED IN TO [facility] [id number] - [place] [address] AT [facility] [phone] [facility] [phone] FX    STATES IT TAKES A PRETTY STRONG ANTIBIOTIC.

## 2022-10-20 ENCOUNTER — OFFICE VISIT (OUTPATIENT)
Dept: FAMILY MEDICINE CLINIC | Facility: CLINIC | Age: 77
End: 2022-10-20

## 2022-10-20 ENCOUNTER — TELEPHONE (OUTPATIENT)
Dept: FAMILY MEDICINE CLINIC | Facility: CLINIC | Age: 77
End: 2022-10-20

## 2022-10-20 VITALS
BODY MASS INDEX: 29.24 KG/M2 | WEIGHT: 197.4 LBS | DIASTOLIC BLOOD PRESSURE: 60 MMHG | RESPIRATION RATE: 20 BRPM | SYSTOLIC BLOOD PRESSURE: 98 MMHG | TEMPERATURE: 97.5 F | HEIGHT: 69 IN | OXYGEN SATURATION: 94 % | HEART RATE: 54 BPM

## 2022-10-20 DIAGNOSIS — E11.65 TYPE 2 DIABETES MELLITUS WITH HYPERGLYCEMIA, WITHOUT LONG-TERM CURRENT USE OF INSULIN: Primary | ICD-10-CM

## 2022-10-20 DIAGNOSIS — J01.00 ACUTE NON-RECURRENT MAXILLARY SINUSITIS: Primary | ICD-10-CM

## 2022-10-20 PROCEDURE — 99213 OFFICE O/P EST LOW 20 MIN: CPT | Performed by: FAMILY MEDICINE

## 2022-10-20 RX ORDER — FLUTICASONE PROPIONATE 50 MCG
2 SPRAY, SUSPENSION (ML) NASAL DAILY
Qty: 16 G | Refills: 11 | Status: SHIPPED | OUTPATIENT
Start: 2022-10-20

## 2022-10-20 RX ORDER — CETIRIZINE HYDROCHLORIDE 10 MG/1
10 TABLET ORAL DAILY
Qty: 30 TABLET | Refills: 11 | Status: SHIPPED | OUTPATIENT
Start: 2022-10-20

## 2022-10-20 RX ORDER — AMOXICILLIN AND CLAVULANATE POTASSIUM 875; 125 MG/1; MG/1
1 TABLET, FILM COATED ORAL 2 TIMES DAILY
Qty: 14 TABLET | Refills: 0 | Status: SHIPPED | OUTPATIENT
Start: 2022-10-20 | End: 2022-11-21

## 2022-10-20 NOTE — TELEPHONE ENCOUNTER
Please print Rx of Januvia 100mg #90 day supply to fax to [facility] at [phone]

## 2022-10-20 NOTE — PROGRESS NOTES
"Chief Complaint   Patient presents with   • cough & chest congestion      For the past 3 wks        Subjective      Antione Douglas is a 77 y.o. who presents for 3 weeks of nasal congestion with rhinorrhea and postnasal drip along with cough and chest congestion with cough productive of yellow sputum.  He denies fevers or chills.  He has mild increased dyspnea that responds well to his albuterol inhaler.  In addition to this he notes some ear pain developing    Objective   Vital Signs:  BP 98/60   Pulse 54   Temp 97.5 °F (36.4 °C)   Resp 20   Ht 175.3 cm (69.02\")   Wt 89.5 kg (197 lb 6.4 oz)   SpO2 94%   BMI 29.14 kg/m²     Physical Exam  Constitutional:       Appearance: Normal appearance.   HENT:      Head: Normocephalic and atraumatic.      Right Ear: Tympanic membrane and ear canal normal.      Ears:      Comments: Right TM has serous effusion with slight bulge, left TM is erythematous with serous effusion and slight bulge     Nose: Nose normal.      Mouth/Throat:      Mouth: Mucous membranes are moist.      Pharynx: Oropharynx is clear.   Eyes:      Conjunctiva/sclera: Conjunctivae normal.   Cardiovascular:      Rate and Rhythm: Normal rate and regular rhythm.      Heart sounds: Normal heart sounds. No murmur heard.  Pulmonary:      Effort: Pulmonary effort is normal. No respiratory distress.      Breath sounds: Wheezing present.      Comments: Wheezes in medial right upper lobe posteriorly  Abdominal:      General: Abdomen is flat. Bowel sounds are normal. There is no distension.      Palpations: Abdomen is soft.      Tenderness: There is no abdominal tenderness.   Musculoskeletal:      Cervical back: Normal range of motion and neck supple. No tenderness.   Lymphadenopathy:      Cervical: No cervical adenopathy.   Skin:     General: Skin is warm and dry.   Neurological:      Mental Status: He is alert.   Psychiatric:         Mood and Affect: Mood normal.          Result Review                   "   Assessment and Plan  Diagnoses and all orders for this visit:    1. Acute non-recurrent maxillary sinusitis (Primary)  -     cetirizine (zyrTEC) 10 MG tablet; Take 1 tablet by mouth Daily.  Dispense: 30 tablet; Refill: 11  -     amoxicillin-clavulanate (Augmentin) 875-125 MG per tablet; Take 1 tablet by mouth 2 (Two) Times a Day.  Dispense: 14 tablet; Refill: 0  -     fluticasone (Flonase) 50 MCG/ACT nasal spray; 2 sprays into the nostril(s) as directed by provider Daily.  Dispense: 16 g; Refill: 11    Other orders  -     Cancel: POCT SARS-CoV-2 Antigen LINDSEY    Patient will start treatment for sinusitis which may be also contributed to by allergic rhinitis.  Change antihistamine.  Start Augmentin.  Add Flonase        Follow Up  No follow-ups on file.  Patient was given instructions and counseling regarding his condition or for health maintenance advice. Please see specific information pulled into the AVS if appropriate.

## 2022-10-25 ENCOUNTER — TELEPHONE (OUTPATIENT)
Dept: FAMILY MEDICINE CLINIC | Facility: CLINIC | Age: 77
End: 2022-10-25

## 2022-10-25 NOTE — TELEPHONE ENCOUNTER
Caller: Maggie Douglas    Relationship: Emergency Contact    Best call back number: [phone]    What was the call regarding: PATIENT IS ASKING FOR SAMPLES JUNEA    Do you require a callback: YES

## 2022-10-26 DIAGNOSIS — J44.9 CHRONIC OBSTRUCTIVE PULMONARY DISEASE, UNSPECIFIED COPD TYPE: ICD-10-CM

## 2022-10-26 DIAGNOSIS — E11.65 TYPE 2 DIABETES MELLITUS WITH HYPERGLYCEMIA, WITHOUT LONG-TERM CURRENT USE OF INSULIN: ICD-10-CM

## 2022-10-31 DIAGNOSIS — E11.65 TYPE 2 DIABETES MELLITUS WITH HYPERGLYCEMIA, WITHOUT LONG-TERM CURRENT USE OF INSULIN: ICD-10-CM

## 2022-10-31 DIAGNOSIS — J44.9 CHRONIC OBSTRUCTIVE PULMONARY DISEASE, UNSPECIFIED COPD TYPE: ICD-10-CM

## 2022-10-31 NOTE — TELEPHONE ENCOUNTER
PATIENT'S WIFE IS CALLING TO STATE THAT HE STILL HAS NOT RECEIVED HIS JANUVIA AND HE IS ALMOST OUT OF SAMPLES.  PHONE NUMBER TO PHARMACY -[phone].

## 2022-11-21 ENCOUNTER — OFFICE VISIT (OUTPATIENT)
Dept: FAMILY MEDICINE CLINIC | Facility: CLINIC | Age: 77
End: 2022-11-21

## 2022-11-21 VITALS
RESPIRATION RATE: 20 BRPM | HEIGHT: 69 IN | SYSTOLIC BLOOD PRESSURE: 120 MMHG | HEART RATE: 65 BPM | WEIGHT: 197.8 LBS | TEMPERATURE: 97.1 F | BODY MASS INDEX: 29.3 KG/M2 | OXYGEN SATURATION: 96 % | DIASTOLIC BLOOD PRESSURE: 70 MMHG

## 2022-11-21 DIAGNOSIS — E11.42 DIABETIC PERIPHERAL NEUROPATHY: ICD-10-CM

## 2022-11-21 DIAGNOSIS — N18.31 STAGE 3A CHRONIC KIDNEY DISEASE: ICD-10-CM

## 2022-11-21 DIAGNOSIS — E11.65 TYPE 2 DIABETES MELLITUS WITH HYPERGLYCEMIA, WITHOUT LONG-TERM CURRENT USE OF INSULIN: Primary | ICD-10-CM

## 2022-11-21 DIAGNOSIS — E11.65 TYPE 2 DIABETES MELLITUS WITH HYPERGLYCEMIA, WITHOUT LONG-TERM CURRENT USE OF INSULIN: ICD-10-CM

## 2022-11-21 LAB
EXPIRATION DATE: ABNORMAL
HBA1C MFR BLD: 6.9 %
Lab: ABNORMAL

## 2022-11-21 PROCEDURE — 3044F HG A1C LEVEL LT 7.0%: CPT | Performed by: FAMILY MEDICINE

## 2022-11-21 PROCEDURE — 99213 OFFICE O/P EST LOW 20 MIN: CPT | Performed by: FAMILY MEDICINE

## 2022-11-21 PROCEDURE — 83036 HEMOGLOBIN GLYCOSYLATED A1C: CPT | Performed by: FAMILY MEDICINE

## 2022-11-21 NOTE — PROGRESS NOTES
"Chief Complaint   Patient presents with   • Follow-up   • Diabetes       Subjective      Antione Douglas is a 77 y.o. who presents for diabetes maintenance visit.  Patient has neuropathy as well as stage IIIa chronic kidney disease.  Patient also has COPD.  He has not checked his blood sugar.  Neuropathy continues to cause a cold sensation in the feet.  He uses his trilogy nightly for COPD    The following portions of the patient's history were reviewed and updated as appropriate: allergies, current medications, past family history, past medical history, past social history, past surgical history and problem list.    Review of Systems    Objective   Vital Signs:  /70   Pulse 65   Temp 97.1 °F (36.2 °C)   Resp 20   Ht 175.3 cm (69.02\")   Wt 89.7 kg (197 lb 12.8 oz)   SpO2 96%   BMI 29.20 kg/m²             Physical Exam  Constitutional:       Appearance: Normal appearance.   Cardiovascular:      Rate and Rhythm: Normal rate and regular rhythm.      Pulses: Normal pulses.      Heart sounds: Normal heart sounds.   Pulmonary:      Effort: Pulmonary effort is normal.      Breath sounds: Normal breath sounds.   Neurological:      Mental Status: He is alert.          Result Review   The following data was reviewed by: Haseeb Luther MD on 11/21/2022:  Most Recent A1C    HGBA1C Most Recent 11/21/22   Hemoglobin A1C 6.9                         Assessment and Plan  Diagnoses and all orders for this visit:    1. Type 2 diabetes mellitus with hyperglycemia, without long-term current use of insulin (HCC) (Primary)  Assessment & Plan:  Diabetes is improving with treatment.   Continue current treatment regimen.  Diabetes will be reassessed in 6 months.    Orders:  -     POC Glycosylated Hemoglobin (Hb A1C)  -     empagliflozin (Jardiance) 10 MG tablet tablet; Take 1 tablet by mouth Daily.  Dispense: 90 tablet; Refill: 1    2. Type 2 diabetes mellitus with hyperglycemia, without long-term current use of insulin " (HCC)  Comments:  Continue Jardiance 10 mg for DM with CKD stage IIIa  Assessment & Plan:  Diabetes is improving with treatment.   Continue current treatment regimen.  Diabetes will be reassessed in 6 months.    Orders:  -     POC Glycosylated Hemoglobin (Hb A1C)  -     empagliflozin (Jardiance) 10 MG tablet tablet; Take 1 tablet by mouth Daily.  Dispense: 90 tablet; Refill: 1    3. Stage 3a chronic kidney disease (HCC)  Comments:   Repeat microalbumin and kidney function in 3 months  Orders:  -     empagliflozin (Jardiance) 10 MG tablet tablet; Take 1 tablet by mouth Daily.  Dispense: 90 tablet; Refill: 1    4. Diabetic peripheral neuropathy (HCC)  Comments:  Continue gabapentin          Follow Up  Return in about 5 months (around 4/21/2023) for Next scheduled follow up and labs.  Patient was given instructions and counseling regarding his condition or for health maintenance advice. Please see specific information pulled into the AVS if appropriate.

## 2022-12-02 DIAGNOSIS — I10 ESSENTIAL HYPERTENSION: ICD-10-CM

## 2022-12-02 DIAGNOSIS — F41.1 GENERALIZED ANXIETY DISORDER: ICD-10-CM

## 2022-12-05 RX ORDER — FUROSEMIDE 20 MG/1
TABLET ORAL
Qty: 270 TABLET | Refills: 1 | Status: SHIPPED | OUTPATIENT
Start: 2022-12-05

## 2022-12-05 RX ORDER — ALPRAZOLAM 0.5 MG/1
TABLET ORAL
Qty: 180 TABLET | Refills: 2 | Status: SHIPPED | OUTPATIENT
Start: 2022-12-05

## 2023-01-03 DIAGNOSIS — K21.9 GASTROESOPHAGEAL REFLUX DISEASE, UNSPECIFIED WHETHER ESOPHAGITIS PRESENT: ICD-10-CM

## 2023-01-03 RX ORDER — OMEPRAZOLE 20 MG/1
CAPSULE, DELAYED RELEASE ORAL
Qty: 90 CAPSULE | Refills: 0 | Status: SHIPPED | OUTPATIENT
Start: 2023-01-03

## 2023-01-12 DIAGNOSIS — Z86.010 HISTORY OF COLON POLYPS: ICD-10-CM

## 2023-01-12 DIAGNOSIS — Z12.11 COLON CANCER SCREENING: Primary | ICD-10-CM

## 2023-03-07 ENCOUNTER — OFFICE VISIT (OUTPATIENT)
Dept: FAMILY MEDICINE CLINIC | Facility: CLINIC | Age: 78
End: 2023-03-07
Payer: MEDICARE

## 2023-03-07 VITALS
TEMPERATURE: 97.7 F | SYSTOLIC BLOOD PRESSURE: 118 MMHG | WEIGHT: 201 LBS | RESPIRATION RATE: 18 BRPM | DIASTOLIC BLOOD PRESSURE: 82 MMHG | HEART RATE: 72 BPM | BODY MASS INDEX: 29.77 KG/M2 | HEIGHT: 69 IN | OXYGEN SATURATION: 95 %

## 2023-03-07 DIAGNOSIS — G95.19 NEUROGENIC CLAUDICATION: ICD-10-CM

## 2023-03-07 DIAGNOSIS — M54.50 CHRONIC MIDLINE LOW BACK PAIN WITHOUT SCIATICA: Primary | ICD-10-CM

## 2023-03-07 DIAGNOSIS — M51.36 LUMBAR DEGENERATIVE DISC DISEASE: ICD-10-CM

## 2023-03-07 DIAGNOSIS — G89.29 CHRONIC MIDLINE LOW BACK PAIN WITHOUT SCIATICA: Primary | ICD-10-CM

## 2023-03-07 PROBLEM — M51.369 LUMBAR DEGENERATIVE DISC DISEASE: Status: ACTIVE | Noted: 2023-03-07

## 2023-03-07 PROCEDURE — 99214 OFFICE O/P EST MOD 30 MIN: CPT | Performed by: FAMILY MEDICINE

## 2023-03-07 PROCEDURE — 3079F DIAST BP 80-89 MM HG: CPT | Performed by: FAMILY MEDICINE

## 2023-03-07 PROCEDURE — 3074F SYST BP LT 130 MM HG: CPT | Performed by: FAMILY MEDICINE

## 2023-03-07 RX ORDER — TRAMADOL HYDROCHLORIDE 50 MG/1
50 TABLET ORAL EVERY 8 HOURS PRN
Qty: 90 TABLET | Refills: 1 | Status: SHIPPED | OUTPATIENT
Start: 2023-03-07 | End: 2023-03-27

## 2023-03-07 NOTE — PROGRESS NOTES
"Chief Complaint   Patient presents with   • Back Pain     Getting worse in the last month, going down to the back of legs       Subjective      Antione Douglas is a 78 y.o. who presents for exacerbation of midline low back pain that is now radiating into the posterior thighs bilaterally.  Pain intensifies with activity.  He does note some changes in the strength in the lower legs as well.  Patient has had 2 prior lumbar spine surgeries.  He has follow-up with Dr. Mcgregor and Dr. Granger in the past but has not seen either physician in at least 2 years.  He has neuropathy of his lower extremities as well.  Gabapentin and Lyrica both caused confusion.    Objective   Vital Signs:  /82   Pulse 72   Temp 97.7 °F (36.5 °C)   Resp 18   Ht 175.3 cm (69\")   Wt 91.2 kg (201 lb)   SpO2 95%   BMI 29.68 kg/m²     Physical Exam  Musculoskeletal:      Lumbar back: Tenderness present. No bony tenderness. Decreased range of motion.      Comments: Posture is stooped   Neurological:      Mental Status: He is alert.      Motor: Weakness present.      Deep Tendon Reflexes:      Reflex Scores:       Patellar reflexes are 1+ on the right side and 1+ on the left side.       Achilles reflexes are 0 on the right side and 0 on the left side.     Comments: 4/5 strength left ankle dorsiflexion          Result Review                     Assessment and Plan  Diagnoses and all orders for this visit:    1. Chronic midline low back pain without sciatica (Primary)  -     traMADol (ULTRAM) 50 MG tablet; Take 1 tablet by mouth Every 8 (Eight) Hours As Needed for Moderate Pain or Severe Pain.  Dispense: 90 tablet; Refill: 1    2. Neurogenic claudication (HCC)  -     traMADol (ULTRAM) 50 MG tablet; Take 1 tablet by mouth Every 8 (Eight) Hours As Needed for Moderate Pain or Severe Pain.  Dispense: 90 tablet; Refill: 1    3. Lumbar degenerative disc disease  -     traMADol (ULTRAM) 50 MG tablet; Take 1 tablet by mouth Every 8 (Eight) Hours As " Needed for Moderate Pain or Severe Pain.  Dispense: 90 tablet; Refill: 1    Plan: Patient is having an exacerbation of a chronic condition that impacts quality of life.  We will make an attempt at medical management with use of tramadol.  Patient educated on side effect of sedation.  Return for routine follow-up visit in April.  If no improvement at that time will refer back to Dr. Mcgregor.        Follow Up  No follow-ups on file.  Patient was given instructions and counseling regarding his condition or for health maintenance advice. Please see specific information pulled into the AVS if appropriate.

## 2023-03-17 DIAGNOSIS — J30.2 SEASONAL ALLERGIES: ICD-10-CM

## 2023-03-20 RX ORDER — LORATADINE 10 MG/1
TABLET ORAL
Qty: 90 TABLET | Refills: 1 | OUTPATIENT
Start: 2023-03-20

## 2023-03-27 ENCOUNTER — TELEPHONE (OUTPATIENT)
Dept: FAMILY MEDICINE CLINIC | Facility: CLINIC | Age: 78
End: 2023-03-27
Payer: MEDICARE

## 2023-03-27 NOTE — TELEPHONE ENCOUNTER
Caller: Maggie Douglas    Relationship: Emergency Contact    Best call back number:  [phone]    Who are you requesting to speak with (clinical staff, provider,  specific staff member):  CLINICAL     What was the call regarding: PATIENT HAD TO STOP TAKING THE MEDICATION TRAMADOL 2 DAYS AGO    HE SAID IT  MADE HIM DIZZY AND HAVE BAD DREAMS     Do you require a callback: PLEASE CALL AND ADVISE

## 2023-03-27 NOTE — TELEPHONE ENCOUNTER
Caller: Antione Douglas    Relationship: Self    Best call back number: [phone]    Who are you requesting to speak with (clinical staff, provider,  specific staff member): DR. MACK    What was the call regarding: THE PATIENT CALLING TO STATE THAT HE CAN NOT CONTINUE WITH THE TRAMADOL AND THAT THE [facility] PRESCRIBED A ZPAK THAT HELPED WITH HIS COLD SYMPTOMS.     Do you require a callback: IF NEEDED.     THANK YOU.

## 2023-04-06 ENCOUNTER — TELEPHONE (OUTPATIENT)
Dept: FAMILY MEDICINE CLINIC | Facility: CLINIC | Age: 78
End: 2023-04-06
Payer: MEDICARE

## 2023-04-06 NOTE — TELEPHONE ENCOUNTER
LM for pt to call us back with more info.  Why was there an EKG done at [facility] on 4-3-23? Was he seen in the ER? If so, for what?     HUB can ask and relay response.

## 2023-04-06 NOTE — TELEPHONE ENCOUNTER
Caller: Antione Douglas    Relationship: Self    Best call back number: [phone]    What test was performed: EKG    When was the test performed: 4.3.23    Where was the test performed: [facility]     Additional notes: PATIENT WOULD LIKE A CALL BACK WITH THE RESULTS.

## 2023-04-10 NOTE — TELEPHONE ENCOUNTER
Caller: Maggie Douglas    Relationship to patient: Emergency Contact    Best call back number: [phone]    Patient is needing:     MAGGIE DOUGLAS ADVISED PATIENT WAS BEING PREPPED FOR A COLONOSCOPY AT [facility] ON 04/04/2023    DURING THIS PROCEDURE THE PATIENTS HEART RATE WAS SPORADIC AND AS A RESULT PERFORMED THE EKG.     CALLER ADVISED PATIENT MAY NEED TO BE REFERRED TO CARDIOLOGY.     PLEASE ADVISE.

## 2023-04-11 NOTE — TELEPHONE ENCOUNTER
Caller: Maggie Douglas    Relationship: Emergency Contact    Best call back number: [phone]    What was the call regarding:   PATIENT'S (WIFE) MAGGIE STATED THAT SHE DROPPED OFF THE EKG RESULTS TO THE OFFICE ON 04/04/2023 AND MAGGIE WOULD LIKE A CALL BACK REGARDING THIS INFORMATION       Do you require a callback: YES

## 2023-04-11 NOTE — TELEPHONE ENCOUNTER
The EKG is similar in appearance to EKGs performed in this office back in November.  We can discuss further at his follow-up visit next week

## 2023-04-21 ENCOUNTER — OFFICE VISIT (OUTPATIENT)
Dept: FAMILY MEDICINE CLINIC | Facility: CLINIC | Age: 78
End: 2023-04-21
Payer: MEDICARE

## 2023-04-21 VITALS
WEIGHT: 195.4 LBS | HEART RATE: 84 BPM | OXYGEN SATURATION: 96 % | HEIGHT: 69 IN | RESPIRATION RATE: 14 BRPM | SYSTOLIC BLOOD PRESSURE: 130 MMHG | BODY MASS INDEX: 28.94 KG/M2 | DIASTOLIC BLOOD PRESSURE: 84 MMHG | TEMPERATURE: 97.1 F

## 2023-04-21 DIAGNOSIS — G89.29 CHRONIC MIDLINE LOW BACK PAIN WITHOUT SCIATICA: ICD-10-CM

## 2023-04-21 DIAGNOSIS — M54.50 CHRONIC MIDLINE LOW BACK PAIN WITHOUT SCIATICA: ICD-10-CM

## 2023-04-21 DIAGNOSIS — K21.9 GASTROESOPHAGEAL REFLUX DISEASE, UNSPECIFIED WHETHER ESOPHAGITIS PRESENT: ICD-10-CM

## 2023-04-21 DIAGNOSIS — I10 ESSENTIAL HYPERTENSION: ICD-10-CM

## 2023-04-21 DIAGNOSIS — Z12.5 ENCOUNTER FOR SCREENING FOR MALIGNANT NEOPLASM OF PROSTATE: ICD-10-CM

## 2023-04-21 DIAGNOSIS — L30.9 DERMATITIS: ICD-10-CM

## 2023-04-21 DIAGNOSIS — R00.2 PALPITATIONS: ICD-10-CM

## 2023-04-21 DIAGNOSIS — E78.2 MIXED HYPERLIPIDEMIA: ICD-10-CM

## 2023-04-21 DIAGNOSIS — I49.3 PVC (PREMATURE VENTRICULAR CONTRACTION): ICD-10-CM

## 2023-04-21 DIAGNOSIS — E11.65 TYPE 2 DIABETES MELLITUS WITH HYPERGLYCEMIA, WITHOUT LONG-TERM CURRENT USE OF INSULIN: Primary | ICD-10-CM

## 2023-04-21 DIAGNOSIS — N18.31 STAGE 3A CHRONIC KIDNEY DISEASE: ICD-10-CM

## 2023-04-21 DIAGNOSIS — I49.1 PAC (PREMATURE ATRIAL CONTRACTION): ICD-10-CM

## 2023-04-21 DIAGNOSIS — E11.65 TYPE 2 DIABETES MELLITUS WITH HYPERGLYCEMIA, WITHOUT LONG-TERM CURRENT USE OF INSULIN: ICD-10-CM

## 2023-04-21 DIAGNOSIS — R97.20 ELEVATED PSA, LESS THAN 10 NG/ML: ICD-10-CM

## 2023-04-21 RX ORDER — TIZANIDINE 2 MG/1
2 TABLET ORAL EVERY 8 HOURS PRN
Qty: 90 TABLET | Refills: 2 | Status: SHIPPED | OUTPATIENT
Start: 2023-04-21 | End: 2023-04-21 | Stop reason: SDUPTHER

## 2023-04-21 RX ORDER — OMEPRAZOLE 20 MG/1
20 CAPSULE, DELAYED RELEASE ORAL DAILY
Qty: 90 CAPSULE | Refills: 3 | Status: SHIPPED | OUTPATIENT
Start: 2023-04-21 | End: 2023-04-21 | Stop reason: SDUPTHER

## 2023-04-21 RX ORDER — FUROSEMIDE 20 MG/1
TABLET ORAL
Qty: 270 TABLET | Refills: 1 | Status: SHIPPED | OUTPATIENT
Start: 2023-04-21 | End: 2023-04-21 | Stop reason: SDUPTHER

## 2023-04-21 RX ORDER — ATORVASTATIN CALCIUM 10 MG/1
10 TABLET, FILM COATED ORAL DAILY
Qty: 90 TABLET | Refills: 3 | Status: SHIPPED | OUTPATIENT
Start: 2023-04-21 | End: 2023-04-21 | Stop reason: SDUPTHER

## 2023-04-21 NOTE — TELEPHONE ENCOUNTER
Caller: Reynold Douglascornelia LUGO    Relationship: Self    Best call back number:    [phone]          Requested Prescriptions:   Requested Prescriptions     Pending Prescriptions Disp Refills   • tiZANidine (ZANAFLEX) 2 MG tablet 90 tablet 2     Sig: Take 1 tablet by mouth Every 8 (Eight) Hours As Needed for Muscle Spasms.   • omeprazole (priLOSEC) 20 MG capsule 90 capsule 3     Sig: Take 1 capsule by mouth Daily.   • betamethasone valerate (VALISONE) 0.1 % ointment 15 g 0     Sig: Apply 1 application topically to the appropriate area as directed 2 (Two) Times a Day.   • atorvastatin (LIPITOR) 10 MG tablet 90 tablet 3     Sig: Take 1 tablet by mouth Daily.   • empagliflozin (Jardiance) 10 MG tablet tablet 90 tablet 1     Sig: Take 1 tablet by mouth Daily.   • furosemide (LASIX) 20 MG tablet 270 tablet 1     Sig: TAKE 2 TABLETS IN THE MORNING AND 1 TABLET IN AFTERNOON        Pharmacy where request should be sent: [facility] - [address] - [phone]  - [phone] FX     Last office visit with prescribing clinician: 4/21/2023   Last telemedicine visit with prescribing clinician: 8/22/2023   Next office visit with prescribing clinician: 8/22/2023     Additional details provided by patient: HAS ONE WEEK REMAINING AND REQUESTING ALL MEDICATION THAT WERE SENT IN TODAY (04/21/2023) AT HIS APPOINTMENT TO BE RECENT TO [facility] INSTEAD OF [facility] AT A 90 SUPPLY WITH REFILLS   Does the patient have less than a 3 day supply:  [] Yes  [x] No    Would you like a call back once the refill request has been completed: [x] Yes [] No    If the office needs to give you a call back, can they leave a voicemail: [x] Yes [] No    Javier Tony Rep   04/21/23 16:24 EDT

## 2023-04-21 NOTE — PROGRESS NOTES
"Chief Complaint   Patient presents with   • 5 mo f/u     Needs Cardiology referral.       Subjective      Antione Douglas is a 78 y.o. who presents for diabetes, hypertension, chronic kidney disease stage IIIa, GERD, mixed hyperlipidemia, low back pain from arthritis.  Patient has a new complaint of palpitations.    Diabetes.  Patient does not check blood sugar.  He denies symptoms of hypoglycemia.    Hypertension.  Patient does not check his blood pressure outside of office visits.    Hyperlipidemia.  Patient takes his medicines as prescribed.    Low back pain.  Patient reports his wife had some Zanaflex from an old prescription which she used and felt like improved his pain.  He did not respond well to tramadol prescribed at last visit.    Abnormal PSA.  In May of last year patient had slight elevation of PSA to 4.7 that he never followed up on.  He denies urinary.    Palpitations.  Patient underwent colonoscopy locally in prior to the procedure and was noted to have an irregular heartbeat.  EKG was performed which showed PACs and PVCs.  We do have a copy of this and it is in the patient's chart.  Colonoscopy proceeded.  He was recommended that he follow-up with cardiology.  Patient has prior EKG in the medical record that does show PVCs.  He does endorse palpitations along with mild dyspnea on exertion    The following portions of the patient's history were reviewed and updated as appropriate: allergies, current medications, past family history, past medical history, past social history, past surgical history and problem list.    Review of Systems    Objective   Vital Signs:  /84   Pulse 84   Temp 97.1 °F (36.2 °C)   Resp 14   Ht 175.3 cm (69\")   Wt 88.6 kg (195 lb 6.4 oz)   SpO2 96%   BMI 28.86 kg/m²     BMI is >= 25 and <30. (Overweight) The following options were offered after discussion;: nutrition counseling/recommendations        Physical Exam  Constitutional:       Appearance: Normal appearance. "   Cardiovascular:      Rate and Rhythm: Normal rate. Rhythm irregular.      Pulses: Normal pulses.      Heart sounds: Normal heart sounds.   Pulmonary:      Effort: Pulmonary effort is normal.      Breath sounds: Normal breath sounds.   Abdominal:      General: Abdomen is flat.      Palpations: Abdomen is soft.   Neurological:      Mental Status: He is alert.          Result Review                     Assessment and Plan  Diagnoses and all orders for this visit:    1. Type 2 diabetes mellitus with hyperglycemia, without long-term current use of insulin (Primary)  Comments:  Stable.  A1c ordered.  Continue current regimen  Orders:  -     empagliflozin (Jardiance) 10 MG tablet tablet; Take 1 tablet by mouth Daily.  Dispense: 90 tablet; Refill: 1  -     Comprehensive Metabolic Panel  -     Lipid Panel  -     Hemoglobin A1c    2. Essential hypertension  Comments:  Adequate control-no changes.  Orders:  -     furosemide (LASIX) 20 MG tablet; TAKE 2 TABLETS IN THE MORNING AND 1 TABLET IN AFTERNOON  Dispense: 270 tablet; Refill: 1  -     Comprehensive Metabolic Panel    3. Stage 3a chronic kidney disease  Comments:  Renal function will be assessed with labs  Orders:  -     empagliflozin (Jardiance) 10 MG tablet tablet; Take 1 tablet by mouth Daily.  Dispense: 90 tablet; Refill: 1  -     Comprehensive Metabolic Panel    4. Mixed hyperlipidemia  Comments:  Stable.  Continue statin.  Lipid panel ordered  Orders:  -     atorvastatin (LIPITOR) 10 MG tablet; Take 1 tablet by mouth Daily.  Dispense: 90 tablet; Refill: 3    5. Gastroesophageal reflux disease, unspecified whether esophagitis present  Comments:  Stable.  Continue omeprazole  Orders:  -     omeprazole (priLOSEC) 20 MG capsule; Take 1 capsule by mouth Daily.  Dispense: 90 capsule; Refill: 3    6. Elevated PSA, less than 10 ng/ml  Comments:  Repeat PSA due to prior abnormal lab  Orders:  -     PSA Screen    7. Encounter for screening for malignant neoplasm of  prostate  -     PSA Screen    8. Chronic midline low back pain without sciatica  Comments:  Prescribed low-dose Zanaflex 2 mg 3 times daily  Orders:  -     tiZANidine (ZANAFLEX) 2 MG tablet; Take 1 tablet by mouth Every 8 (Eight) Hours As Needed for Muscle Spasms.  Dispense: 90 tablet; Refill: 2    9. Palpitations  -     Ambulatory Referral to Cardiology    10. PVC (premature ventricular contraction)  -     Ambulatory Referral to Cardiology    11. PAC (premature atrial contraction)  -     Ambulatory Referral to Cardiology    12. Dermatitis  -     betamethasone valerate (VALISONE) 0.1 % ointment; Apply 1 application topically to the appropriate area as directed 2 (Two) Times a Day.  Dispense: 15 g; Refill: 0            Follow Up  Return if symptoms worsen or fail to improve.  Patient was given instructions and counseling regarding his condition or for health maintenance advice. Please see specific information pulled into the AVS if appropriate.

## 2023-04-22 LAB
ALBUMIN SERPL-MCNC: 4.2 G/DL (ref 3.7–4.7)
ALBUMIN/GLOB SERPL: 1.4 {RATIO} (ref 1.2–2.2)
ALP SERPL-CCNC: 127 IU/L (ref 44–121)
ALT SERPL-CCNC: 18 IU/L (ref 0–44)
AST SERPL-CCNC: 18 IU/L (ref 0–40)
BILIRUB SERPL-MCNC: 0.4 MG/DL (ref 0–1.2)
BUN SERPL-MCNC: 17 MG/DL (ref 8–27)
BUN/CREAT SERPL: 11 (ref 10–24)
CALCIUM SERPL-MCNC: 9.2 MG/DL (ref 8.6–10.2)
CHLORIDE SERPL-SCNC: 103 MMOL/L (ref 96–106)
CHOLEST SERPL-MCNC: 133 MG/DL (ref 100–199)
CO2 SERPL-SCNC: 24 MMOL/L (ref 20–29)
CREAT SERPL-MCNC: 1.55 MG/DL (ref 0.76–1.27)
EGFRCR SERPLBLD CKD-EPI 2021: 46 ML/MIN/1.73
GLOBULIN SER CALC-MCNC: 3.1 G/DL (ref 1.5–4.5)
GLUCOSE SERPL-MCNC: 121 MG/DL (ref 70–99)
HBA1C MFR BLD: 7 % (ref 4.8–5.6)
HDLC SERPL-MCNC: 50 MG/DL
LDLC SERPL CALC-MCNC: 67 MG/DL (ref 0–99)
POTASSIUM SERPL-SCNC: 4.2 MMOL/L (ref 3.5–5.2)
PROT SERPL-MCNC: 7.3 G/DL (ref 6–8.5)
PSA SERPL-MCNC: 5.9 NG/ML (ref 0–4)
SODIUM SERPL-SCNC: 143 MMOL/L (ref 134–144)
TRIGL SERPL-MCNC: 83 MG/DL (ref 0–149)
VLDLC SERPL CALC-MCNC: 16 MG/DL (ref 5–40)

## 2023-04-23 DIAGNOSIS — R97.20 ELEVATED PSA, LESS THAN 10 NG/ML: Primary | ICD-10-CM

## 2023-04-24 RX ORDER — FUROSEMIDE 20 MG/1
TABLET ORAL
Qty: 270 TABLET | Refills: 1 | Status: SHIPPED | OUTPATIENT
Start: 2023-04-24

## 2023-04-24 RX ORDER — OMEPRAZOLE 20 MG/1
20 CAPSULE, DELAYED RELEASE ORAL DAILY
Qty: 90 CAPSULE | Refills: 3 | Status: SHIPPED | OUTPATIENT
Start: 2023-04-24

## 2023-04-24 RX ORDER — TIZANIDINE 2 MG/1
2 TABLET ORAL EVERY 8 HOURS PRN
Qty: 90 TABLET | Refills: 2 | Status: SHIPPED | OUTPATIENT
Start: 2023-04-24

## 2023-04-24 RX ORDER — ATORVASTATIN CALCIUM 10 MG/1
10 TABLET, FILM COATED ORAL DAILY
Qty: 90 TABLET | Refills: 3 | Status: SHIPPED | OUTPATIENT
Start: 2023-04-24

## 2023-05-30 DIAGNOSIS — F41.1 GENERALIZED ANXIETY DISORDER: ICD-10-CM

## 2023-05-30 DIAGNOSIS — J30.2 SEASONAL ALLERGIES: ICD-10-CM

## 2023-05-30 RX ORDER — ALPRAZOLAM 0.5 MG/1
TABLET ORAL
Qty: 180 TABLET | Refills: 5 | Status: SHIPPED | OUTPATIENT
Start: 2023-06-06

## 2023-05-30 RX ORDER — LORATADINE 10 MG/1
TABLET ORAL
Qty: 90 TABLET | Refills: 1 | OUTPATIENT
Start: 2023-05-30

## 2023-06-07 ENCOUNTER — TELEPHONE (OUTPATIENT)
Dept: FAMILY MEDICINE CLINIC | Facility: CLINIC | Age: 78
End: 2023-06-07
Payer: MEDICARE

## 2023-06-07 NOTE — TELEPHONE ENCOUNTER
Caller: Maggie Douglas    Relationship: Emergency Contact    Best call back number: [phone]    What medications are you currently taking:   Current Outpatient Medications on File Prior to Visit   Medication Sig Dispense Refill    albuterol (PROVENTIL HFA;VENTOLIN HFA) 108 (90 BASE) MCG/ACT inhaler Inhale 2 puffs Every 6 (Six) Hours As Needed. ProAir  (90 Base) MCG/ACT Inhalation Aerosol Solution; Patient Sig: ProAir  (90 Base) MCG/ACT Inhalation Aerosol Solution ; 0; 08-Aug-2013; Active      ALPRAZolam (XANAX) 0.5 MG tablet TAKE 1 TO 2 TABLETS THREE TIMES DAILY AS NEEDED FOR ANXIETY 180 tablet 5    atorvastatin (LIPITOR) 10 MG tablet Take 1 tablet by mouth Daily. 90 tablet 3    betamethasone valerate (VALISONE) 0.1 % ointment Apply 1 application topically to the appropriate area as directed 2 (Two) Times a Day. 15 g 0    cetirizine (zyrTEC) 10 MG tablet Take 1 tablet by mouth Daily. 30 tablet 11    DULoxetine (CYMBALTA) 60 MG capsule Take 1 capsule by mouth Daily. 90 capsule 3    empagliflozin (Jardiance) 10 MG tablet tablet Take 1 tablet by mouth Daily. 90 tablet 1    fluticasone (Flonase) 50 MCG/ACT nasal spray 2 sprays into the nostril(s) as directed by provider Daily. 16 g 11    Fluticasone-Umeclidin-Vilant (Trelegy Ellipta) 100-62.5-25 MCG/ACT inhaler Inhale 1 puff Daily. 3 each 3    furosemide (LASIX) 20 MG tablet TAKE 2 TABLETS IN THE MORNING AND 1 TABLET IN AFTERNOON 270 tablet 1    glucose blood (BRODERICK CONTOUR TEST) test strip Test once daily  DX E11.8 100 each 1    omeprazole (priLOSEC) 20 MG capsule Take 1 capsule by mouth Daily. 90 capsule 3    SITagliptin (Januvia) 100 MG tablet Take 1 tablet by mouth Daily. 90 tablet 3    tiZANidine (ZANAFLEX) 2 MG tablet Take 1 tablet by mouth Every 8 (Eight) Hours As Needed for Muscle Spasms. 90 tablet 2     No current facility-administered medications on file prior to visit.       What are your concerns: CALLED STATED THAT RX LORATADINE HAS BEEN  DENIED, WILL LIKE TO KNOW IF ANOTHER RX TO REPLACE RX HAS BEEN SENT TO PHARMACY

## 2023-06-08 NOTE — TELEPHONE ENCOUNTER
I do not see loratadine on his medication list.  I do see cetirizine which is also an antihistamine.  Both medications are over-the-counter.  Is there another medication he is referring to?

## 2023-06-18 DIAGNOSIS — E87.6 HYPOKALEMIA: Primary | ICD-10-CM

## 2023-06-18 RX ORDER — POTASSIUM CHLORIDE 20 MEQ/1
20 TABLET, EXTENDED RELEASE ORAL 2 TIMES DAILY
Qty: 180 TABLET | Refills: 0 | Status: SHIPPED | OUTPATIENT
Start: 2023-06-18

## 2023-07-26 ENCOUNTER — TELEPHONE (OUTPATIENT)
Dept: FAMILY MEDICINE CLINIC | Facility: CLINIC | Age: 78
End: 2023-07-26
Payer: MEDICARE

## 2023-07-26 NOTE — TELEPHONE ENCOUNTER
Caller: Maggie Douglas    Relationship to patient: Emergency Contact    Best call back number: [phone]     Patient is needing: PATIENTS WIFE STATES HE WAS SUPPOSED TO HAVE A REFERRAL PLACED AND AN APPOINTMENT MADE FOR SOME SORT OF A FLOW TEST. SHE STATES HE WAS GIVEN THE REFERRAL DUE TO HIS TOES TURNING PURPLE.     PLEASE CALL BACK, IF NO ANSWER LEAVE A DETAILED MESSAGE.

## 2023-07-27 NOTE — TELEPHONE ENCOUNTER
ULTRASOUND WAS SENT TO [facility] 7/20 THEY SHOULD BE IN CONTACT WITH PATIENT SOON THEIR NUMBER [phone].     TRIED TO CALL PATIENT BUT LINE WAS BUSY

## 2023-08-07 ENCOUNTER — HOSPITAL ENCOUNTER (OUTPATIENT)
Dept: CARDIOLOGY | Facility: HOSPITAL | Age: 78
Discharge: HOME | End: 2023-08-07
Payer: MEDICARE

## 2023-08-07 VITALS
SYSTOLIC BLOOD PRESSURE: 100 MMHG | WEIGHT: 198.85 LBS | HEART RATE: 88 BPM | HEIGHT: 70 IN | DIASTOLIC BLOOD PRESSURE: 60 MMHG | BODY MASS INDEX: 28.47 KG/M2

## 2023-08-07 VITALS — HEIGHT: 70 IN | BODY MASS INDEX: 28.47 KG/M2 | WEIGHT: 198.85 LBS

## 2023-08-07 DIAGNOSIS — R06.09 DOE (DYSPNEA ON EXERTION): ICD-10-CM

## 2023-08-07 DIAGNOSIS — I49.3 PVC (PREMATURE VENTRICULAR CONTRACTION): ICD-10-CM

## 2023-08-07 LAB
BH CV REST NUCLEAR ISOTOPE DOSE: 9.9 MCI
BH CV STRESS BP STAGE 2: NORMAL
BH CV STRESS BP STAGE 4: NORMAL
BH CV STRESS COMMENTS STAGE 1: NORMAL
BH CV STRESS DOSE REGADENOSON STAGE 1: 0.4
BH CV STRESS DURATION MIN STAGE 1: 1
BH CV STRESS DURATION MIN STAGE 2: 1
BH CV STRESS DURATION MIN STAGE 3: 1
BH CV STRESS DURATION MIN STAGE 4: 1
BH CV STRESS DURATION SEC STAGE 1: 0
BH CV STRESS DURATION SEC STAGE 2: 0
BH CV STRESS DURATION SEC STAGE 3: 0
BH CV STRESS DURATION SEC STAGE 4: 0
BH CV STRESS HR STAGE 1: 83
BH CV STRESS HR STAGE 2: 103
BH CV STRESS HR STAGE 3: 100
BH CV STRESS HR STAGE 4: 96
BH CV STRESS NUCLEAR ISOTOPE DOSE: 33 MCI
BH CV STRESS O2 STAGE 1: 92
BH CV STRESS O2 STAGE 2: 95
BH CV STRESS O2 STAGE 3: 95
BH CV STRESS O2 STAGE 4: 96
BH CV STRESS PROTOCOL 1: NORMAL
BH CV STRESS RECOVERY BP: NORMAL MMHG
BH CV STRESS RECOVERY HR: 96 BPM
BH CV STRESS RECOVERY O2: 93 %
BH CV STRESS STAGE 1: 1
BH CV STRESS STAGE 2: 2
BH CV STRESS STAGE 3: 3
BH CV STRESS STAGE 4: 4
LV EF NUC BP: 55 %
MAXIMAL PREDICTED HEART RATE: 143 BPM
PERCENT MAX PREDICTED HR: 72.03 %
STRESS BASELINE BP: NORMAL MMHG
STRESS BASELINE HR: 88 BPM
STRESS O2 SAT REST: 93 %
STRESS PERCENT HR: 85 %
STRESS POST ESTIMATED WORKLOAD: 1 METS
STRESS POST EXERCISE DUR MIN: 4 MIN
STRESS POST EXERCISE DUR SEC: 0 SEC
STRESS POST O2 SAT PEAK: 96 %
STRESS POST PEAK BP: NORMAL MMHG
STRESS POST PEAK HR: 103 BPM
STRESS TARGET HR: 122 BPM

## 2023-08-07 PROCEDURE — 0 TECHNETIUM SESTAMIBI: Performed by: INTERNAL MEDICINE

## 2023-08-07 PROCEDURE — A9500 TC99M SESTAMIBI: HCPCS | Performed by: INTERNAL MEDICINE

## 2023-08-07 PROCEDURE — 78452 HT MUSCLE IMAGE SPECT MULT: CPT | Performed by: INTERNAL MEDICINE

## 2023-08-07 PROCEDURE — 93306 TTE W/DOPPLER COMPLETE: CPT

## 2023-08-07 PROCEDURE — 93306 TTE W/DOPPLER COMPLETE: CPT | Performed by: INTERNAL MEDICINE

## 2023-08-07 PROCEDURE — 78452 HT MUSCLE IMAGE SPECT MULT: CPT

## 2023-08-07 PROCEDURE — 25010000002 REGADENOSON 0.4 MG/5ML SOLUTION: Performed by: INTERNAL MEDICINE

## 2023-08-07 PROCEDURE — 93018 CV STRESS TEST I&R ONLY: CPT | Performed by: INTERNAL MEDICINE

## 2023-08-07 PROCEDURE — 93017 CV STRESS TEST TRACING ONLY: CPT

## 2023-08-07 RX ORDER — REGADENOSON 0.08 MG/ML
0.4 INJECTION, SOLUTION INTRAVENOUS ONCE
Status: COMPLETED | OUTPATIENT
Start: 2023-08-07 | End: 2023-08-07

## 2023-08-07 RX ADMIN — TECHNETIUM TC 99M SESTAMIBI 1 DOSE: 1 INJECTION INTRAVENOUS at 11:19

## 2023-08-07 RX ADMIN — TECHNETIUM TC 99M SESTAMIBI 1 DOSE: 1 INJECTION INTRAVENOUS at 13:15

## 2023-08-07 RX ADMIN — REGADENOSON 0.4 MG: 0.08 INJECTION, SOLUTION INTRAVENOUS at 13:14

## 2023-08-08 ENCOUNTER — TELEPHONE (OUTPATIENT)
Dept: CARDIOLOGY | Facility: CLINIC | Age: 78
End: 2023-08-08
Payer: MEDICARE

## 2023-08-08 LAB
BH CV ECHO MEAS - AO MAX PG: 7 MMHG
BH CV ECHO MEAS - AO MEAN PG: 4 MMHG
BH CV ECHO MEAS - AO ROOT DIAM: 3.3 CM
BH CV ECHO MEAS - AO V2 MAX: 132 CM/SEC
BH CV ECHO MEAS - AO V2 VTI: 21 CM
BH CV ECHO MEAS - AVA(I,D): 1.9 CM2
BH CV ECHO MEAS - EDV(CUBED): 110.6 ML
BH CV ECHO MEAS - EDV(MOD-SP2): 92.3 ML
BH CV ECHO MEAS - EDV(MOD-SP4): 72.5 ML
BH CV ECHO MEAS - EF(MOD-BP): 49.4 %
BH CV ECHO MEAS - EF(MOD-SP2): 38 %
BH CV ECHO MEAS - EF(MOD-SP4): 55.7 %
BH CV ECHO MEAS - ESV(CUBED): 50.7 ML
BH CV ECHO MEAS - ESV(MOD-SP2): 57.2 ML
BH CV ECHO MEAS - ESV(MOD-SP4): 32.1 ML
BH CV ECHO MEAS - FS: 22.9 %
BH CV ECHO MEAS - IVS/LVPW: 1 CM
BH CV ECHO MEAS - IVSD: 0.9 CM
BH CV ECHO MEAS - LA DIMENSION: 4 CM
BH CV ECHO MEAS - LAT PEAK E' VEL: 10.4 CM/SEC
BH CV ECHO MEAS - LV DIASTOLIC VOL/BSA (35-75): 34.9 CM2
BH CV ECHO MEAS - LV MASS(C)D: 147.8 GRAMS
BH CV ECHO MEAS - LV MAX PG: 2.6 MMHG
BH CV ECHO MEAS - LV MEAN PG: 1 MMHG
BH CV ECHO MEAS - LV SYSTOLIC VOL/BSA (12-30): 15.4 CM2
BH CV ECHO MEAS - LV V1 MAX: 80.7 CM/SEC
BH CV ECHO MEAS - LV V1 VTI: 12.7 CM
BH CV ECHO MEAS - LVIDD: 4.8 CM
BH CV ECHO MEAS - LVIDS: 3.7 CM
BH CV ECHO MEAS - LVOT AREA: 3.1 CM2
BH CV ECHO MEAS - LVOT DIAM: 2 CM
BH CV ECHO MEAS - LVPWD: 0.9 CM
BH CV ECHO MEAS - MED PEAK E' VEL: 6.2 CM/SEC
BH CV ECHO MEAS - MV A MAX VEL: 85.4 CM/SEC
BH CV ECHO MEAS - MV DEC SLOPE: 512 CM/SEC2
BH CV ECHO MEAS - MV DEC TIME: 0.16 MSEC
BH CV ECHO MEAS - MV E MAX VEL: 49.3 CM/SEC
BH CV ECHO MEAS - MV E/A: 0.58
BH CV ECHO MEAS - MV MAX PG: 4.2 MMHG
BH CV ECHO MEAS - MV MEAN PG: 2 MMHG
BH CV ECHO MEAS - MV P1/2T: 34.6 MSEC
BH CV ECHO MEAS - MV V2 VTI: 18.7 CM
BH CV ECHO MEAS - MVA(P1/2T): 6.4 CM2
BH CV ECHO MEAS - MVA(VTI): 2.13 CM2
BH CV ECHO MEAS - PA ACC TIME: 0.1 SEC
BH CV ECHO MEAS - RAP SYSTOLE: 3 MMHG
BH CV ECHO MEAS - RVSP: 9 MMHG
BH CV ECHO MEAS - SI(MOD-SP2): 16.9 ML/M2
BH CV ECHO MEAS - SI(MOD-SP4): 19.4 ML/M2
BH CV ECHO MEAS - SV(LVOT): 39.9 ML
BH CV ECHO MEAS - SV(MOD-SP2): 35.1 ML
BH CV ECHO MEAS - SV(MOD-SP4): 40.4 ML
BH CV ECHO MEAS - TAPSE (>1.6): 1.69 CM
BH CV ECHO MEAS - TR MAX PG: 6.2 MMHG
BH CV ECHO MEAS - TR MAX VEL: 123.5 CM/SEC
BH CV ECHO MEASUREMENTS AVERAGE E/E' RATIO: 5.94
BH CV VAS BP RIGHT ARM: NORMAL MMHG
BH CV XLRA - RV BASE: 3.2 CM
BH CV XLRA - RV LENGTH: 7.1 CM
BH CV XLRA - RV MID: 2.7 CM
BH CV XLRA - TDI S': 10.4 CM/SEC
LEFT ATRIUM VOLUME INDEX: 19.9 ML/M2

## 2023-08-22 ENCOUNTER — OFFICE VISIT (OUTPATIENT)
Dept: FAMILY MEDICINE CLINIC | Facility: CLINIC | Age: 78
End: 2023-08-22
Payer: MEDICARE

## 2023-08-22 VITALS
RESPIRATION RATE: 22 BRPM | WEIGHT: 197 LBS | DIASTOLIC BLOOD PRESSURE: 64 MMHG | OXYGEN SATURATION: 95 % | HEART RATE: 58 BPM | SYSTOLIC BLOOD PRESSURE: 112 MMHG | HEIGHT: 70 IN | TEMPERATURE: 96.9 F | BODY MASS INDEX: 28.2 KG/M2

## 2023-08-22 DIAGNOSIS — E11.42 DIABETIC PERIPHERAL NEUROPATHY: ICD-10-CM

## 2023-08-22 DIAGNOSIS — Z00.00 ANNUAL PHYSICAL EXAM: ICD-10-CM

## 2023-08-22 DIAGNOSIS — F41.1 GENERALIZED ANXIETY DISORDER: ICD-10-CM

## 2023-08-22 DIAGNOSIS — G31.84 MILD COGNITIVE IMPAIRMENT WITH MEMORY LOSS: ICD-10-CM

## 2023-08-22 DIAGNOSIS — E78.2 MIXED HYPERLIPIDEMIA: ICD-10-CM

## 2023-08-22 DIAGNOSIS — Z00.00 ENCOUNTER FOR SUBSEQUENT ANNUAL WELLNESS VISIT (AWV) IN MEDICARE PATIENT: Primary | ICD-10-CM

## 2023-08-22 RX ORDER — DULOXETIN HYDROCHLORIDE 30 MG/1
30 CAPSULE, DELAYED RELEASE ORAL DAILY
Qty: 90 CAPSULE | Refills: 3 | Status: SHIPPED | OUTPATIENT
Start: 2023-08-22

## 2023-08-22 RX ORDER — ALPRAZOLAM 0.5 MG/1
TABLET ORAL
Qty: 180 TABLET | Refills: 2 | Status: SHIPPED | OUTPATIENT
Start: 2023-08-22

## 2023-08-22 RX ORDER — ATORVASTATIN CALCIUM 10 MG/1
10 TABLET, FILM COATED ORAL DAILY
Qty: 90 TABLET | Refills: 3 | Status: SHIPPED | OUTPATIENT
Start: 2023-08-22

## 2023-08-22 NOTE — PROGRESS NOTES
The ABCs of the Annual Wellness Visit  Subsequent Medicare Wellness Visit    Subjective      Antione Douglas is a 78 y.o. male who presents for a Subsequent Medicare Wellness Visit and Annual Physical.    The following portions of the patient's history were reviewed and   updated as appropriate: allergies, current medications, past family history, past medical history, past social history, past surgical history, and problem list.    Compared to one year ago, the patient feels his physical   health is the same.    Compared to one year ago, the patient feels his mental   health is the same.    Recent Hospitalizations:  He was not admitted to the hospital during the last year.       Current Medical Providers:  Patient Care Team:  Haseeb Luther MD as PCP - General (Family Medicine)  Yunior Miller DPM as Consulting Physician (Podiatry)  James Parker MD as Consulting Physician (Cardiology)    Outpatient Medications Prior to Visit   Medication Sig Dispense Refill    DULoxetine (CYMBALTA) 60 MG capsule TAKE 1 CAPSULE EVERY DAY 90 capsule 3    empagliflozin (Jardiance) 10 MG tablet tablet Take 1 tablet by mouth Daily. 90 tablet 1    fluticasone (Flonase) 50 MCG/ACT nasal spray 2 sprays into the nostril(s) as directed by provider Daily. 16 g 11    Fluticasone-Umeclidin-Vilant (Trelegy Ellipta) 100-62.5-25 MCG/ACT inhaler Inhale 1 puff Daily. 3 each 3    furosemide (LASIX) 20 MG tablet TAKE 2 TABLETS IN THE MORNING AND 1 TABLET IN AFTERNOON 270 tablet 1    glucose blood (BRODERICK CONTOUR TEST) test strip Test once daily  DX E11.8 100 each 1    omeprazole (priLOSEC) 20 MG capsule Take 1 capsule by mouth Daily. 90 capsule 3    potassium chloride (K-DUR,KLOR-CON) 20 MEQ CR tablet Take 1 tablet by mouth 2 (Two) Times a Day. 180 tablet 0    SITagliptin (Januvia) 100 MG tablet Take 1 tablet by mouth Daily. 90 tablet 3    tiZANidine (ZANAFLEX) 2 MG tablet Take 1 tablet by mouth Every 8 (Eight) Hours As Needed for  Muscle Spasms. 90 tablet 2    vitamin B-12 (CYANOCOBALAMIN) 1000 MCG tablet Take 5 tablets by mouth Daily.      ALPRAZolam (XANAX) 0.5 MG tablet TAKE 1 TO 2 TABLETS THREE TIMES DAILY AS NEEDED FOR ANXIETY 180 tablet 5    atorvastatin (LIPITOR) 10 MG tablet Take 1 tablet by mouth Daily. 90 tablet 3    DULoxetine (CYMBALTA) 30 MG capsule Take 1 capsule by mouth Daily. 30 capsule 0    polyethylene glycol (MiraLax) 17 GM/SCOOP powder Take 17 g by mouth Daily. (Patient not taking: Reported on 8/22/2023) 850 g 1     No facility-administered medications prior to visit.       No opioid medication identified on active medication list. I have reviewed chart for other potential  high risk medication/s and harmful drug interactions in the elderly.        Aspirin is not on active medication list.  Aspirin use is not indicated based on review of current medical condition/s. Risk of harm outweighs potential benefits.  .    Patient Active Problem List   Diagnosis    Chronic obstructive pulmonary disease    Generalized anxiety disorder    Essential hypertension    Primary insomnia    Arthralgia of hip    Chronic bilateral low back pain    Cobalamin deficiency    Asymptomatic PVCs    Chronic idiopathic constipation    Stage 3a chronic kidney disease    Diabetic peripheral neuropathy    Benign prostatic hyperplasia with urinary hesitancy    Peripheral vascular disease of foot    Mixed hyperlipidemia    DDD (degenerative disc disease), cervical    History of fusion of cervical spine    Seasonal allergies    Diabetic autonomic neuropathy associated with type 2 diabetes mellitus    Obstructive sleep apnea    Mild cognitive impairment with memory loss    Type 2 diabetes mellitus with hyperglycemia, without long-term current use of insulin    Lumbar degenerative disc disease     Advance Care Planning   Advance Care Planning     Advance Directive is on file.  ACP discussion was held with the patient during this visit. Patient has an advance  "directive in EMR which is still valid.       Review of Systems   Constitutional:  Positive for fatigue.   HENT: Negative.     Eyes: Negative.    Respiratory:  Positive for shortness of breath.    Cardiovascular:  Positive for leg swelling.   Gastrointestinal:  Positive for constipation.   Endocrine: Negative.    Genitourinary: Negative.    Musculoskeletal:  Positive for back pain, gait problem and neck pain.   Skin: Negative.    Allergic/Immunologic: Negative.    Neurological:  Positive for weakness.   Hematological: Negative.    Psychiatric/Behavioral:  Positive for decreased concentration and sleep disturbance.           Objective    Vitals:    08/22/23 1417   BP: 112/64  Comment: Was difficult to get accurate b/p due to irregular pulse   Pulse: 58  Comment: Irregular pulse   Resp: 22   Temp: 96.9 øF (36.1 øC)   SpO2: 95%   Weight: 89.4 kg (197 lb)   Height: 177.8 cm (70\")   PainSc: 0-No pain     Estimated body mass index is 28.27 kg/mý as calculated from the following:    Height as of this encounter: 177.8 cm (70\").    Weight as of this encounter: 89.4 kg (197 lb).       Physical Exam  Vitals reviewed.   Constitutional:       Appearance: Normal appearance.   HENT:      Head: Normocephalic.      Right Ear: Tympanic membrane normal.      Left Ear: Tympanic membrane normal.      Nose: Nose normal.      Mouth/Throat:      Mouth: Mucous membranes are moist.   Eyes:      Pupils: Pupils are equal, round, and reactive to light.   Neck:      Vascular: No carotid bruit.   Cardiovascular:      Rate and Rhythm: Normal rate and regular rhythm.      Pulses: Normal pulses.           Dorsalis pedis pulses are 1+ on the right side and 0 on the left side.        Posterior tibial pulses are 1+ on the right side and 1+ on the left side.      Heart sounds: Normal heart sounds.   Pulmonary:      Effort: Pulmonary effort is normal.      Breath sounds: Normal breath sounds.   Abdominal:      General: Abdomen is flat.      Palpations: " Abdomen is soft.   Musculoskeletal:      Cervical back: Normal range of motion and neck supple. No rigidity.      Right lower leg: No edema.      Left lower leg: No edema.   Feet:      Right foot:      Skin integrity: Skin integrity normal.      Left foot:      Skin integrity: Skin integrity normal.      Comments: Feet are warm to touch and mildly erythematous in color.  Erythema is blanchable with capillary refill less than -3 seconds.  Right second toe has purplish discoloration that is also easily blanchable.  Lymphadenopathy:      Cervical: No cervical adenopathy.   Skin:     General: Skin is warm.   Neurological:      Mental Status: He is alert.      Gait: Gait abnormal.   Psychiatric:         Mood and Affect: Mood normal.         Behavior: Behavior normal.         Thought Content: Thought content normal.         Judgment: Judgment normal.         Does the patient have evidence of cognitive impairment?   Yes: Follow up in 3months. Abnormal Mini-Cog. 2/3 word recall, abnormal clock(hands were incorrect)            HEALTH RISK ASSESSMENT    Smoking Status:  Social History     Tobacco Use   Smoking Status Former    Packs/day: 2.00    Years: 50.00    Pack years: 100.00    Types: Cigarettes    Quit date: 2007    Years since quittin.6   Smokeless Tobacco Never   Tobacco Comments    quit smoking in      Alcohol Consumption:  Social History     Substance and Sexual Activity   Alcohol Use Yes    Alcohol/week: 3.0 standard drinks    Types: 3 Cans of beer per week    Comment: SOCIAL     Fall Risk Screen:    NEREIDAADI Fall Risk Assessment was completed, and patient is at MODERATE risk for falls. Assessment completed on:2023    Depression Screenin/22/2023     2:22 PM   PHQ-2/PHQ-9 Depression Screening   Little Interest or Pleasure in Doing Things 2-->more than half the days   Feeling Down, Depressed or Hopeless 3-->nearly every day   Trouble Falling or Staying Asleep, or Sleeping Too Much 3-->nearly  every day   Feeling Tired or Having Little Energy 3-->nearly every day   Poor Appetite or Overeating 0-->not at all   Feeling Bad about Yourself - or that You are a Failure or Have Let Yourself or Your Family Down 0-->not at all   Trouble Concentrating on Things, Such as Reading the Newspaper or Watching Television 1-->several days   Moving or Speaking So Slowly that Other People Could Have Noticed? Or the Opposite - Being So Fidgety 0-->not at all   Thoughts that You Would be Better Off Dead or of Hurting Yourself in Some Way 0-->not at all   PHQ-9: Brief Depression Severity Measure Score 12   If You Checked Off Any Problems, How Difficult Have These Problems Made It For You to Do Your Work, Take Care of Things at Home, or Get Along with Other People? very difficult       Health Habits and Functional and Cognitive Screenin/22/2023     2:21 PM   Functional & Cognitive Status   Do you have difficulty preparing food and eating? No   Do you have difficulty bathing yourself, getting dressed or grooming yourself? No   Do you have difficulty using the toilet? No   Do you have difficulty moving around from place to place? Yes   Do you have trouble with steps or getting out of a bed or a chair? Yes   Current Diet Other   Dental Exam Up to date   Eye Exam Up to date   Exercise (times per week) 0 times per week   Current Exercises Include No Regular Exercise   Do you need help using the phone?  No   Are you deaf or do you have serious difficulty hearing?  No   Do you need help to go to places out of walking distance? No   Do you need help shopping? No   Do you need help preparing meals?  No   Do you need help with housework?  No   Do you need help with laundry? No   Do you need help taking your medications? No   Do you need help managing money? No   Do you ever drive or ride in a car without wearing a seat belt? No   Have you felt unusual stress, anger or loneliness in the last month? No   Who do you live with?  Spouse   If you need help, do you have trouble finding someone available to you? No   Have you been bothered in the last four weeks by sexual problems? No   Do you have difficulty concentrating, remembering or making decisions? Yes       Age-appropriate Screening Schedule:  Refer to the list below for future screening recommendations based on patient's age, sex and/or medical conditions. Orders for these recommended tests are listed in the plan section. The patient has been provided with a written plan.    Health Maintenance   Topic Date Due    DXA SCAN  Never done    TDAP/TD VACCINES (1 - Tdap) Never done    ZOSTER VACCINE (1 of 2) Never done    COVID-19 Vaccine (7 - Pfizer series) 02/21/2023    URINE MICROALBUMIN  08/19/2023    INFLUENZA VACCINE  10/01/2023    HEMOGLOBIN A1C  10/21/2023    DIABETIC EYE EXAM  10/26/2023    LIPID PANEL  04/21/2024    ANNUAL WELLNESS VISIT  08/22/2024    COLORECTAL CANCER SCREENING  04/04/2026    HEPATITIS C SCREENING  Completed    Pneumococcal Vaccine 65+  Completed                  CMS Preventative Services Quick Reference  Risk Factors Identified During Encounter:    Chronic Pain:  Cont. Duloxetin 90 mg daily  Fall Risk-High or Moderate: Discussed Fall Prevention in the home  Immunizations Discussed/Encouraged: Influenza and COVID19  Dental Screening Recommended  Vision Screening Recommended    The above risks/problems have been discussed with the patient.  Pertinent information has been shared with the patient in the After Visit Summary.    Diagnoses and all orders for this visit:    1. Encounter for subsequent annual wellness visit (AWV) in Medicare patient (Primary)    2. Annual physical exam    3. Diabetic peripheral neuropathy  -     DULoxetine (CYMBALTA) 30 MG capsule; Take 1 capsule by mouth Daily.  Dispense: 90 capsule; Refill: 3    4. Diabetic peripheral neuropathy  Comments:  Not controlled.  Increase duloxetine to 90 mg daily.  RTO 1 month  Orders:  -     DULoxetine  (CYMBALTA) 30 MG capsule; Take 1 capsule by mouth Daily.  Dispense: 90 capsule; Refill: 3    5. Generalized anxiety disorder  -     ALPRAZolam (XANAX) 0.5 MG tablet; 1-2 tablets three times daily as needed for anxiety  Dispense: 180 tablet; Refill: 2    6. Mild cognitive impairment with memory loss    7. Mixed hyperlipidemia  -     atorvastatin (LIPITOR) 10 MG tablet; Take 1 tablet by mouth Daily.  Dispense: 90 tablet; Refill: 3    Plan (in addition to above)  1.  Diabetic peripheral neuropathy seems to have responded to duloxetine 90 mg.  Continue current dosing  2.  Patient scheduled for ABIs tomorrow at [facility].  Wife instructed to contact the office in 1 week if she has not heard results  3.  Patient displays cognitive impairment.  diagnoses of sleep apnea as well as use of sedating substances confounds diagnosis of dementia.  On return visit in 3 months mini cog will be repeated.  4.  Patient has been on alprazolam for quite some time to use for anxiety and he also uses this to help initiate sleep.  Patient has developed a habit of taking a dose at around 5 AM when he wakes up to use the bathroom.  I recommended discontinuation of this 5 AM dose.  5.  Patient reported at today's visit a history of PTSD which was part of the reason he received disability.  We discussed prazosin to help with the nightmares he experiences 3 nights per week but due to his instability and use of other sedating medications no medication was started at today's visit.  If he is able to wean some of his alprazolam use consideration can be given to prizes  6.  Diabetes will be reassessed at follow-up visit  7.  Regarding his chronic constipation he has not used the MiraLAX daily as instructed at last visit.  We reviewed instructions for MiraLAX and he can uptitrate to 4 doses per day as needed for constipation.    Follow Up:   Next Medicare Wellness visit to be scheduled in 1 year.      An After Visit Summary and PPPS were made available to  the patient.

## 2023-08-24 DIAGNOSIS — I73.9 CLAUDICATION OF BOTH LOWER EXTREMITIES: ICD-10-CM

## 2023-10-25 DIAGNOSIS — I10 ESSENTIAL HYPERTENSION: ICD-10-CM

## 2023-10-25 RX ORDER — FUROSEMIDE 20 MG/1
TABLET ORAL
Qty: 270 TABLET | Refills: 10 | Status: SHIPPED | OUTPATIENT
Start: 2023-10-25

## 2024-03-21 ENCOUNTER — HOSPITAL ENCOUNTER (OUTPATIENT)
Dept: RADIATION ONCOLOGY | Facility: HOSPITAL | Age: 79
Setting detail: RADIATION/ONCOLOGY SERIES
Discharge: HOME | End: 2024-03-21
Payer: MEDICARE

## 2024-04-05 ENCOUNTER — HOSPITAL ENCOUNTER (OUTPATIENT)
Dept: RADIATION ONCOLOGY | Facility: HOSPITAL | Age: 79
Setting detail: RADIATION/ONCOLOGY SERIES
Discharge: HOME | End: 2024-04-05
Payer: MEDICARE

## 2024-04-05 ENCOUNTER — OFFICE VISIT (OUTPATIENT)
Dept: RADIATION ONCOLOGY | Facility: HOSPITAL | Age: 79
End: 2024-04-05
Payer: MEDICARE

## 2024-04-05 VITALS
DIASTOLIC BLOOD PRESSURE: 70 MMHG | HEIGHT: 70 IN | WEIGHT: 197.7 LBS | BODY MASS INDEX: 28.3 KG/M2 | TEMPERATURE: 97.9 F | HEART RATE: 79 BPM | RESPIRATION RATE: 16 BRPM | SYSTOLIC BLOOD PRESSURE: 156 MMHG | OXYGEN SATURATION: 97 %

## 2024-04-05 DIAGNOSIS — E11.65 TYPE 2 DIABETES MELLITUS WITH HYPERGLYCEMIA, WITHOUT LONG-TERM CURRENT USE OF INSULIN: ICD-10-CM

## 2024-04-05 DIAGNOSIS — C61 PROSTATE CANCER: Primary | ICD-10-CM

## 2024-04-05 PROCEDURE — G0463 HOSPITAL OUTPT CLINIC VISIT: HCPCS

## 2024-04-05 RX ORDER — ALBUTEROL SULFATE 90 UG/1
2 AEROSOL, METERED RESPIRATORY (INHALATION) EVERY 4 HOURS PRN
COMMUNITY

## 2024-04-05 NOTE — PROGRESS NOTES
CONSULTATION NOTE    NAME:      Antione Douglas  :                                                          1945  DATE OF CONSULTATION:                       24  REQUESTING PHYSICIAN:                   David Eason MD  REASON FOR CONSULTATION:           Further evaluation management of the patient's adenocarcinoma the prostate for consideration of CyberKnife SBRT treatments at the quest of Dr. Eason.           BRIEF HISTORY:  Antione Douglas  is a very pleasant 79 y.o. male with a history of diabetes, some heart failure, cataracts hypertension hyperlipidemia, diabetes related polyneuropathy, and claustrophobia who has been recently found to have prostate cancer.  The patient reports that about a year ago he began to have sudden incontinence of urine.  The patient reports that this happened fairly quickly.  He reports that he several times a day leaks urine.  He does wear depends.  He reports that he does have incontinence about 80% of the time.  The patient reports that he has tried little bit of medicine for this but that did not really help.  Patient reports that he has some hesitancy but once he is started his flow is good.  The patient denies any burning or stinging.  The patient denies any issues following his biopsy.  The patient had a PSA in  that was 10.3.  He was then referred to Dr. Eason who sent the patient for an MRI scan.  This showed of 4.7 x 4.7 cm prostate.  The patient did have a couple of a small lesions in the prostate but no evidence of extra prostatic disease.    Patient had a biopsy in 2024 with Dr. Eason that showed [place] 3+4 equal 7 disease and 3 out of 5 cores.  Mostly low percentage of the cores.  The patient also had a biopsy of the region of interest showing [place] 3+ +3 equal 6 disease and 5% of the left anterior lateral zone.    The patient then had a bone scan that showed uptake in a couple of his left ribs.  Patient also reports a couple of  falls.    The patient discussed treatment options with the Dr. Eason and was uninterested in surgery.  The patient was referred to our clinic to consider radiation treatments.        BMI:  Body mass index is 28.37 kg/m².      Social History     Substance and Sexual Activity   Alcohol Use Yes    Alcohol/week: 2.0 standard drinks of alcohol    Types: 2 Shots of liquor per week    Comment: SOCIAL       Allergies   Allergen Reactions    Amitriptyline Other (See Comments)     Caused vision and hearing problems and constipation    Neurontin [Gabapentin] Confusion    Lyrica [Pregabalin] Confusion       Social History     Tobacco Use    Smoking status: Former     Current packs/day: 0.00     Average packs/day: 2.0 packs/day for 50.0 years (100.0 ttl pk-yrs)     Types: Cigarettes     Start date: 1957     Quit date: 2007     Years since quittin.2    Smokeless tobacco: Never    Tobacco comments:     quit smoking in    Vaping Use    Vaping status: Never Used   Substance Use Topics    Alcohol use: Yes     Alcohol/week: 2.0 standard drinks of alcohol     Types: 2 Shots of liquor per week     Comment: SOCIAL    Drug use: No         Past Medical History:   Diagnosis Date    Allergic rhinitis     Anxiety     Arthritis     Chronic neck pain     COPD (chronic obstructive pulmonary disease)     Depression     Diabetes mellitus     Duodenal adenoma     Leukocytosis 2018    Not viewed to be an issue but reactive and Hem/Onc suggests that we simply monitor annually.     Low back pain     Myocardial infarction     Osteopenia     Prostate cancer     Sleep apnea     intolerant to CPAP       family history includes Coronary artery disease in his father and mother; Hypertension in his daughter and mother; Pancreatic cancer in his father.     Past Surgical History:   Procedure Laterality Date    BACK SURGERY  2009    Left minimally invasive L4-5 far lateral lumbar discectomy Dr. Levi Mcgregor ([facility])     "CERVICAL FUSION      C5-6, C6-7 ACDF (unknown date, unknown surgeon)    COLONOSCOPY  4/4/2023    EYE SURGERY      INTERVENTIONAL RADIOLOGY PROCEDURE N/A 08/05/2019    Procedure: IR myelogram cervical spine;  Surgeon: Vern Cunningham MD;  Location: [facility] INVASIVE LOCATION;  Service: Interventional Radiology    SINUS SURGERY          Review of Systems   Respiratory:  Positive for shortness of breath.    Gastrointestinal:  Positive for constipation.   Genitourinary:  Positive for bladder incontinence and frequency.    Neurological:  Positive for dizziness and light-headedness.   Psychiatric/Behavioral:  Positive for depression and sleep disturbance. The patient is nervous/anxious.     A full 14 point review of systems was performed and was negative except as noted in the HPI.         Objective   VITAL SIGNS:   Vitals:    04/05/24 1119   BP: 156/70   Pulse: 79   Resp: 16   Temp: 97.9 °F (36.6 °C)   TempSrc: Temporal   SpO2: 97%   Weight: 89.7 kg (197 lb 11.2 oz)   Height: 177.8 cm (70\")   PainSc: 0-No pain          IPSS Questionnaire (AUA-7):  Over the past month…    1)  Incomplete Emptying  How often have you had a sensation of not emptying your bladder?  3 - About half the time   2)  Frequency  How often have you had to urinate less than every two hours? 3 - About half the time   3)  Intermittency  How often have you found you stopped and started again several times when you urinated?  2 - Less than half the time   4) Urgency  How often have you found it difficult to postpone urination?  5 - Almost always   5) Weak Stream  How often have you had a weak urinary stream?  3 - About half the time   6) Straining  How often have you had to push or strain to begin urination?  2 - Less than half the time   7) Nocturia  How many times did you typically get up at night to urinate?  4 - 4 times   Total Score:  22       Quality of life due to urinary symptoms:  If you were to spend the rest of your life with your urinary " condition the way it is now, how would you feel about that? 3-Mixed   Urine Leakage (Incontinence) 0-No Leakage     Sexual Health Inventory  Current Status    1)  How do you rate your confidence that you could achieve and keep an erection? 1-Very Low   2) When you had erections with sexual stimulation, how often were your erections hard enough for penetration (entering your partner)? 0-No sexual activity   3)  During sexual intercourse, how often were you able to maintain your erection after you had penetrated (entered) into your partner? 0-Did not attempt intercourse   4) During sexual intercourse, how difficult was it to maintain your erection to completion of intercourse? 0-Did not attempt intercourse   5) When you attempted sexual intercourse, how often was it satisfactory to you? 0-No sexual activity   Total Score: 1       Bowel Health Inventory  Current Status: 0-No problems, no rectal bleeding, no discharge, less then 5 bowel movements a day      KPS       90%    Physical Exam  Constitutional:       General: He is not in acute distress.     Appearance: He is well-developed.   HENT:      Head: Normocephalic and atraumatic.   Eyes:      Conjunctiva/sclera: Conjunctivae normal.      Pupils: Pupils are equal, round, and reactive to light.   Neck:      Trachea: No tracheal deviation.   Pulmonary:      Effort: Pulmonary effort is normal. No respiratory distress.      Breath sounds: No wheezing.   Abdominal:      General: There is no distension.      Palpations: Abdomen is soft.   Genitourinary:     Comments: TJ documented by Dr. Eason in the chart.  Deferred today  Musculoskeletal:         General: Normal range of motion.      Cervical back: Normal range of motion.   Skin:     General: Skin is warm and dry.   Neurological:      Mental Status: He is alert.      Cranial Nerves: No cranial nerve deficit.      Coordination: Coordination normal.   Psychiatric:         Behavior: Behavior normal.         Judgment:  Judgment normal.              The following portions of the patient's history were reviewed and updated as appropriate: allergies, current medications, past family history, past medical history, past social history, past surgical history, and problem list.  I reviewed the patient's MRI images.  I reviewed the bone scan report  I reviewed the patient's biopsy report.  I reviewed the patient's PSAs.  I reviewed Dr. Eason's notes.      Assessment      IMPRESSION:     The patient is a very pleasant 79-year-old gentleman with a pre-existing history of incontinence potentially related to diabetic polyneuropathy given his very symptomatic and what appears to be poorly controlled disease.  The patient does have adenocarcinoma of the prostate [place] 3+4 equal 7 CT1 cN0 M0 pretreatment PSA 10.  The patient is a very poor surgical candidate given his multiple comorbidities.  The patient is actually going very soon to see the cardiologist regarding an abnormal stress test.  We discussed that his heart health moving forward should be his #1 priority and that we can work around this as needed in the future to address his prostate cancer.    We discussed the treatments for prostate cancer.  The patient is not interested in surgery and is back candidate for that modality.  We discussed radiation options today.  The patient is most interested in CyberKnife.  We discussed that the patient is at high risk for developing issues related to the radiation treatments due to his pre-existing problems.  We discussed the risk for potential catheter.  We discussed the expected acute and chronic complications from a course of radiotherapy.    We discussed that his diabetes presents issues.  We discussed what may result from needing additional anticoagulation for any heart related issues in the future.      We discussed the expected control rates from prostate cancer with CyberKnife.      The patient is interested in treatments at this  time.  The patient will call us back after his cardiology appointment to see if we need to hold off on proceeding.  The patient will need fiducial markers placed and then can be ready to proceed with radiation planning.  The patient has claustrophobia and may require Xanax prior to an MRI if we get that far.    Greater than 1 hour was spent preparing for and coordinating this visit. >50% of the time was spent in direct face to face conversation with the patient teaching, answering question, and providing explanations regarding the patient's case.  The decision to treat the patient with radiation is a complex one and carries the risk of long-term side effects and complications.  The patient's malignancy represents a complicated life threatening condition that requires complex multidisciplinary management for treatment and followup.      RECOMMENDATIONS:    Prostate cancer  -CT 1 cN0 M0  -[place] 3+4 equal 7  -Pretreatment PSA 10  -High risk of complications from any type of radiation  -Pre-existing incontinence likely related to diabetes and polyneuropathy  -Recommend CyberKnife weekly 35 Gray 5 fractions  -Would need fiducial markers placed  -Follows with Dr. Eason  -Recommend the patient call us back after he has his appoint with cardiology to determine if we need to proceed with treatment planning now or wait until he is optimized with cardiology.           Dave Storm MD      Errors in dictation may reflect use of voice recognition software and not all errors in transcription may have been detected prior to signing.

## 2024-04-09 ENCOUNTER — TRANSCRIBE ORDERS (OUTPATIENT)
Dept: ADMINISTRATIVE | Facility: HOSPITAL | Age: 79
End: 2024-04-09
Payer: MEDICARE

## 2024-04-09 DIAGNOSIS — R06.09 OTHER FORMS OF DYSPNEA: Primary | ICD-10-CM

## 2024-04-10 ENCOUNTER — TELEPHONE (OUTPATIENT)
Dept: RADIATION ONCOLOGY | Facility: HOSPITAL | Age: 79
End: 2024-04-10
Payer: MEDICARE

## 2024-04-10 DIAGNOSIS — C61 PROSTATE CANCER: Primary | ICD-10-CM

## 2024-04-10 NOTE — TELEPHONE ENCOUNTER
In preparation of your upcoming cyberknife treatments:  You are scheduled for markers on 4/26/24 with Dr. Mejía, that office will call and make all the necessary arrangements for that appointment.  5/10/24 at 2pm you are scheduled for a telehealth  5/12/24 please start your gas reducing diet along with gasX with meals and at bedtime  5/14/24 do not eat or drink after midnight, do not wear any metal and you will need an enema before leaving home. Arrive at Dr. Storm's office at 1100 for CT sim followed by MRI

## 2024-05-06 ENCOUNTER — HOSPITAL ENCOUNTER (OUTPATIENT)
Dept: PULMONOLOGY | Facility: HOSPITAL | Age: 79
Discharge: HOME | End: 2024-05-06
Admitting: INTERNAL MEDICINE
Payer: MEDICARE

## 2024-05-06 DIAGNOSIS — R06.09 OTHER FORMS OF DYSPNEA: ICD-10-CM

## 2024-05-06 PROCEDURE — 94729 DIFFUSING CAPACITY: CPT

## 2024-05-06 PROCEDURE — 94060 EVALUATION OF WHEEZING: CPT

## 2024-05-06 PROCEDURE — 94726 PLETHYSMOGRAPHY LUNG VOLUMES: CPT

## 2024-05-06 PROCEDURE — 94640 AIRWAY INHALATION TREATMENT: CPT

## 2024-05-06 RX ORDER — ALBUTEROL SULFATE 2.5 MG/3ML
2.5 SOLUTION RESPIRATORY (INHALATION) EVERY 4 HOURS PRN
Status: DISCONTINUED | OUTPATIENT
Start: 2024-05-06 | End: 2024-05-07 | Stop reason: HOSPADM

## 2024-05-06 RX ADMIN — ALBUTEROL SULFATE 2.5 MG: 2.5 SOLUTION RESPIRATORY (INHALATION) at 15:13

## 2024-05-09 ENCOUNTER — DOCUMENTATION (OUTPATIENT)
Dept: NUTRITION | Facility: HOSPITAL | Age: 79
End: 2024-05-09
Payer: MEDICARE

## 2024-05-10 ENCOUNTER — OFFICE VISIT (OUTPATIENT)
Dept: RADIATION ONCOLOGY | Facility: HOSPITAL | Age: 79
End: 2024-05-10
Payer: MEDICARE

## 2024-05-10 ENCOUNTER — HOSPITAL ENCOUNTER (OUTPATIENT)
Dept: RADIATION ONCOLOGY | Facility: HOSPITAL | Age: 79
Setting detail: RADIATION/ONCOLOGY SERIES
Discharge: HOME | End: 2024-05-10
Payer: MEDICARE

## 2024-05-10 DIAGNOSIS — C61 PROSTATE CANCER: Primary | ICD-10-CM

## 2024-05-10 RX ORDER — TAMSULOSIN HYDROCHLORIDE 0.4 MG/1
1 CAPSULE ORAL NIGHTLY
Qty: 30 CAPSULE | Refills: 5 | Status: SHIPPED | OUTPATIENT
Start: 2024-05-10

## 2024-05-14 ENCOUNTER — HOSPITAL ENCOUNTER (OUTPATIENT)
Dept: RADIATION ONCOLOGY | Facility: HOSPITAL | Age: 79
Setting detail: RADIATION/ONCOLOGY SERIES
Discharge: HOME | End: 2024-05-14
Payer: MEDICARE

## 2024-05-14 ENCOUNTER — HOSPITAL ENCOUNTER (OUTPATIENT)
Dept: MRI IMAGING | Facility: HOSPITAL | Age: 79
Discharge: HOME | End: 2024-05-14
Admitting: RADIOLOGY
Payer: MEDICARE

## 2024-05-14 DIAGNOSIS — C61 PROSTATE CANCER: ICD-10-CM

## 2024-05-14 PROCEDURE — 72195 MRI PELVIS W/O DYE: CPT

## 2024-05-14 PROCEDURE — 77399 UNLISTED PX MED RADJ PHYSICS: CPT | Performed by: RADIOLOGY

## 2024-05-17 PROCEDURE — 77300 RADIATION THERAPY DOSE PLAN: CPT | Performed by: RADIOLOGY

## 2024-05-17 PROCEDURE — 77301 RADIOTHERAPY DOSE PLAN IMRT: CPT | Performed by: RADIOLOGY

## 2024-05-17 PROCEDURE — 77338 DESIGN MLC DEVICE FOR IMRT: CPT | Performed by: RADIOLOGY

## 2024-05-23 ENCOUNTER — HOSPITAL ENCOUNTER (OUTPATIENT)
Dept: RADIATION ONCOLOGY | Facility: HOSPITAL | Age: 79
Discharge: HOME | End: 2024-05-23

## 2024-05-23 PROCEDURE — 77373 STRTCTC BDY RAD THER TX DLVR: CPT | Performed by: RADIOLOGY

## 2024-05-30 ENCOUNTER — HOSPITAL ENCOUNTER (OUTPATIENT)
Dept: RADIATION ONCOLOGY | Facility: HOSPITAL | Age: 79
Setting detail: RADIATION/ONCOLOGY SERIES
Discharge: HOME | End: 2024-05-30
Payer: MEDICARE

## 2024-05-30 PROCEDURE — 77373 STRTCTC BDY RAD THER TX DLVR: CPT | Performed by: RADIOLOGY

## 2024-06-06 ENCOUNTER — HOSPITAL ENCOUNTER (OUTPATIENT)
Dept: RADIATION ONCOLOGY | Facility: HOSPITAL | Age: 79
Setting detail: RADIATION/ONCOLOGY SERIES
Discharge: HOME | End: 2024-06-06
Payer: MEDICARE

## 2024-06-06 PROCEDURE — 77373 STRTCTC BDY RAD THER TX DLVR: CPT | Performed by: RADIOLOGY

## 2024-06-13 ENCOUNTER — HOSPITAL ENCOUNTER (OUTPATIENT)
Dept: RADIATION ONCOLOGY | Facility: HOSPITAL | Age: 79
Setting detail: RADIATION/ONCOLOGY SERIES
Discharge: HOME | End: 2024-06-13
Payer: MEDICARE

## 2024-06-13 PROCEDURE — 77373 STRTCTC BDY RAD THER TX DLVR: CPT | Performed by: RADIOLOGY

## 2024-06-20 ENCOUNTER — HOSPITAL ENCOUNTER (OUTPATIENT)
Dept: RADIATION ONCOLOGY | Facility: HOSPITAL | Age: 79
Setting detail: RADIATION/ONCOLOGY SERIES
Discharge: HOME | End: 2024-06-20
Payer: MEDICARE

## 2024-06-20 PROCEDURE — 77336 RADIATION PHYSICS CONSULT: CPT | Performed by: RADIOLOGY

## 2024-06-20 PROCEDURE — 77373 STRTCTC BDY RAD THER TX DLVR: CPT | Performed by: RADIOLOGY

## 2024-08-05 ENCOUNTER — OFFICE VISIT (OUTPATIENT)
Dept: RADIATION ONCOLOGY | Facility: HOSPITAL | Age: 79
End: 2024-08-05
Payer: MEDICARE

## 2024-08-05 ENCOUNTER — HOSPITAL ENCOUNTER (OUTPATIENT)
Dept: RADIATION ONCOLOGY | Facility: HOSPITAL | Age: 79
Setting detail: RADIATION/ONCOLOGY SERIES
Discharge: HOME | End: 2024-08-05
Payer: MEDICARE

## 2024-08-05 DIAGNOSIS — C61 PROSTATE CANCER: Primary | ICD-10-CM

## 2024-08-05 NOTE — PROGRESS NOTES
FOLLOW UP NOTE    PATIENT:                                                      Antione Douglas  MEDICAL RECORD #:                        [id number]  :                                                          1945  COMPLETION DATE:   2024  DIAGNOSIS:     Prostate adenocarcinoma         -cT1c N0 M0    This visit has been converted to a telehealth virtual visit, the patient's preferred method for today's follow-up.  Total time of discussion was 10 minutes.  The patient has given verbal consent.      BRIEF HISTORY:  The patient is a very pleasant 79 y.o. male with a history of diabetes, hypertension, hyperlipidemia, diabetes related polyneuropathy, and chronic kidney disease who has been recently found to have prostate cancer.  The patient reports that about a year ago he began to have sudden incontinence of urine.  The patient reports wearing Depends briefs due to frequent episodes of leaking/incontinence during the day.  The patient reports that he has tried medication for this but that did not really help.    The patient had a PSA in  that was 10.3.  He was then referred to Dr. Eason who sent the patient for an MRI scan.  This showed 4.7 x 4.7 cm prostate.  The patient did have a couple of small lesions in the prostate but no evidence of extraprostatic disease.     The patient had a biopsy in 2024 with Dr. Eason that showed [place] 3+4 = 7 disease and 3 out of 5 cores.  Mostly low percentage of the cores.  The patient also had a biopsy of the region of interest showing [place] 3+3 = 6 disease and 5% of the left anterior lateral zone.     The patient then had a bone scan that showed uptake in a couple of his left ribs.  The patient also reports a couple of falls.     The patient discussed treatment options with the Dr. Eason and was uninterested in surgery.  The patient was referred to our clinic to consider radiation treatments and was most interested in CyberKnife.    Following  placement of gold seed fiducials, the patient underwent definitive treatment consisting of CyberKnife SBRT, 35 Gray/5 fractions, delivered to the prostate.  He completed treatments 6/20/2024.  The patient tolerated treatment well.  From a symptom standpoint, he reports energy is slowly improving.  The patient reports no significant change in his chronic, moderate lower urinary tract symptoms except for some brief exacerbation of urinary urgency beyond baseline.  He continues on daily Flomax.  The patient denies gross hematuria or dysuria.  The patient reports he developed some constipation following treatment, managed with MiraLax with good effect.  He denies acute GI complaints otherwise.  The patient denies additional concerns today.            MEDICATIONS: Medication reconciliation for the patient was reviewed and confirmed in the electronic medical record.    Review of Systems - Oncology  Constitutional:  Positive for fatigue.   Respiratory:  Positive for shortness of breath.    Gastrointestinal:  Positive for constipation.   Genitourinary:  Positive for bladder incontinence and frequency.    Neurological:  Positive for dizziness and light-headedness.   Psychiatric/Behavioral:  Positive for depression and sleep disturbance. The patient is nervous/anxious.         C/o claustrophobia   All other systems reviewed and are negative.         KPS 80%      Physical Exam  Pulmonary:      Respirations even, unlabored. No audible wheezing or cough.  Neurological:      A+Ox4, conversant, answers questions appropriately.  Psychiatric:     Judgement, affect, and decision-making WNL.    Limited physical exam as visit was conducted remotely via telephone.              The following portions of the patient's history were reviewed and updated as appropriate: allergies, current medications, past family history, past medical history, past social history, past surgical history and problem list.         Diagnoses and all orders for  this visit:    1. Prostate cancer (Primary)         IMPRESSION:  The patient is a very pleasant 79-year-old gentleman with a clinical T1 cN0 M0 adenocarcinoma of the prostate, [place] 3+4 = 7, with pretreatment PSA 10.3.  The patient is a very poor surgical candidate given his multiple medical comorbidities.  The patient completed definitive SBRT treatments to the prostate on 6/20/2024.  He tolerated treatment well.  He developed the anticipated grade 1 fatigue and some mild urinary and bowel side effects that continue to appropriately subside.  The patient continues Flomax for management of his chronic LUTS.  We discussed that his pre-existing history of urinary incontinence is likely/potentially related to diabetic polyneuropathy given his very symptomatic and what appears to be poorly controlled disease.  We discussed that his diabetes presents issues.  The patient and I discussed potential acute, subacute, and late radiation related toxicities and management strategies.  The patient will continue routine urologic surveillance under the care of Dr. Eason, with follow-up and repeat PSA scheduled 9/24/2024.    Mr. Douglas and ANTON have reviewed the survivorship care plan in detail.  We discussed diagnosis, follow-up intervals, biannual PSA monitoring, and expectations for response to treatment, including typical timeline for PSA to hopefully reach target julio value of <0.5 ng/mL.  A copy of the care plan has been mailed to the patient.  A copy has also been sent to his PCP.     RECOMMENDATIONS:    Intermediate risk prostate cancer  -clinical T1c N0 M0  -[place] 3+4 = 7  -Pretreatment PSA 10.3  -High risk of complications from any type of radiation  -Pre-existing incontinence likely related to diabetes and polyneuropathy  -Recommend CyberKnife weekly 35 Gray 5 fractions  -Status post fiducial marker placement on 4/26/2024 with Dr. Mejía  -Has been evaluated by cardiology and OK to proceed with treatment  -Status post  CyberKnife SBRT 35 Gray in 5 fractions   -completed 6/20/2024  -Repeat PSA 9/24/2024  -Follows with Dr. Corby Eason    Hypertension  -Follows with Dr. Acosta of cardiology    DM Type II  -Oral DM medication per PCP  -A1c 6.7%  -Diabetic polyneuropathy possible contributor to pre-existing urinary incontinence         Return in about 6 months (around 2/5/2025) for Office Visit.    BRANDO Cooper    I spent a total of 20 minutes on today's visit, with more than 10 minutes in virtual communication with the patient via telephone, and the remainder of the time spent in reviewing the relevant history, records, available imaging, and for documentation.

## 2024-10-24 ENCOUNTER — OFFICE VISIT (OUTPATIENT)
Dept: CARDIOLOGY | Facility: CLINIC | Age: 79
End: 2024-10-24
Payer: MEDICARE

## 2024-10-24 VITALS
BODY MASS INDEX: 28.2 KG/M2 | HEART RATE: 58 BPM | HEIGHT: 70 IN | OXYGEN SATURATION: 93 % | DIASTOLIC BLOOD PRESSURE: 70 MMHG | SYSTOLIC BLOOD PRESSURE: 136 MMHG | WEIGHT: 197 LBS

## 2024-10-24 DIAGNOSIS — E78.2 MIXED HYPERLIPIDEMIA: ICD-10-CM

## 2024-10-24 DIAGNOSIS — I49.3 PVC (PREMATURE VENTRICULAR CONTRACTION): ICD-10-CM

## 2024-10-24 DIAGNOSIS — R06.09 DOE (DYSPNEA ON EXERTION): Primary | ICD-10-CM

## 2024-10-24 DIAGNOSIS — E11.65 TYPE 2 DIABETES MELLITUS WITH HYPERGLYCEMIA, WITHOUT LONG-TERM CURRENT USE OF INSULIN: ICD-10-CM

## 2024-10-24 PROCEDURE — 99214 OFFICE O/P EST MOD 30 MIN: CPT | Performed by: INTERNAL MEDICINE

## 2024-10-24 PROCEDURE — 3078F DIAST BP <80 MM HG: CPT | Performed by: INTERNAL MEDICINE

## 2024-10-24 PROCEDURE — 3075F SYST BP GE 130 - 139MM HG: CPT | Performed by: INTERNAL MEDICINE

## 2024-10-24 RX ORDER — METHOCARBAMOL 500 MG/1
500 TABLET, FILM COATED ORAL
COMMUNITY
Start: 2024-09-12

## 2024-10-24 RX ORDER — BACLOFEN 10 MG/1
10 TABLET ORAL
COMMUNITY

## 2024-10-24 RX ORDER — BISOPROLOL FUMARATE 5 MG/1
5 TABLET, FILM COATED ORAL DAILY
Qty: 90 TABLET | Refills: 3 | Status: SHIPPED | OUTPATIENT
Start: 2024-10-24

## 2024-10-24 RX ORDER — ESCITALOPRAM OXALATE 5 MG/1
5 TABLET ORAL
COMMUNITY
Start: 2024-10-10

## 2024-10-24 NOTE — PROGRESS NOTES
"[facility] Cardiology  Consultation H&P  Antione Douglas  1945  [address] [place] [address]     VISIT DATE:  10/24/24    PCP: Kianna Pichardo, APRN  [address] [place] [address]    IDENTIFICATION: A 79 y.o. male retired   Dr. Dugan's brother-in-law    PROBLEM LIST:  CAD    2007ish [facility] <50% plaque Dr [place]   8/23 Lexiscan wnl EF 55% mild CAC  Syncope    2018 echo EF 55% wnl    3/24 OSH echo EF >50% IR AV scler  rvsp 19  Palpitations/PVC    Chronic frequent RVOT morphology  HTN  HL    1/24 118/62/49/56  DM     4/23 A1c 7.0    2017 [facility] wnl    4/22 CT abd distal Ao/Iliac calcifications 8/23 B [facility] wnl  RACHAEL - NIV intolerant  COPD-smoking cessation approx 2007  CKD 3- 1.4  Prostate CA 2/2024 Dr NORMA Eason, Cyberknife    LBP  Cervical stenosis -C5-7 ACDF(myelogram 8/19-Dr Cunningham)  \"Failed back syndrome\" injections [facility] Dr [place]    CC:  Chief Complaint   Patient presents with    DILL (dyspnea on exertion)       Allergies  Allergies   Allergen Reactions    Amitriptyline Other (See Comments)     Caused vision and hearing problems and constipation    Neurontin [Gabapentin] Confusion    Lyrica [Pregabalin] Confusion       Current Medications    Current Outpatient Medications:     albuterol sulfate  (90 Base) MCG/ACT inhaler, Inhale 2 puffs Every 4 (Four) Hours As Needed for Wheezing., Disp: , Rfl:     ALPRAZolam (XANAX) 0.5 MG tablet, 1-2 tablets three times daily as needed for anxiety, Disp: 180 tablet, Rfl: 2    atorvastatin (LIPITOR) 10 MG tablet, Take 1 tablet by mouth Daily., Disp: 90 tablet, Rfl: 3    baclofen (LIORESAL) 10 MG tablet, 1 tablet., Disp: , Rfl:     cholecalciferol (VITAMIN D3) 1.25 MG (72644 UT) capsule, Take 1 capsule by mouth Every 7 (Seven) Days., Disp: , Rfl:     escitalopram (LEXAPRO) 5 MG tablet, 1 tablet., Disp: , Rfl:     Fluticasone-Umeclidin-Vilant (Trelegy Ellipta) 100-62.5-25 MCG/ACT inhaler, Inhale 1 puff Daily., Disp: 3 each, Rfl: 3    " "glucose blood (BRODERICK CONTOUR TEST) test strip, Test once daily  DX E11.8, Disp: 100 each, Rfl: 1    methocarbamol (ROBAXIN) 500 MG tablet, 1 tablet., Disp: , Rfl:     omeprazole (priLOSEC) 20 MG capsule, Take 1 capsule by mouth Daily., Disp: 90 capsule, Rfl: 3    SITagliptin (Januvia) 100 MG tablet, Take 1 tablet by mouth Daily., Disp: 90 tablet, Rfl: 3    vitamin B-12 (CYANOCOBALAMIN) 1000 MCG tablet, Take 5 tablets by mouth Daily., Disp: , Rfl:     bisoprolol (ZEBeta) 5 MG tablet, Take 1 tablet by mouth Daily., Disp: 90 tablet, Rfl: 3    potassium chloride (K-DUR,KLOR-CON) 20 MEQ CR tablet, Take 1 tablet by mouth 2 (Two) Times a Day., Disp: 180 tablet, Rfl: 0     History of Present Illness   SANDRA Douglas is a 79 y.o. year old male with the above mentioned PMH who presents for fu.   He slowing down significantly.  He has significant lumbar back disease and neuropathy.  He ambulates at a very slow pace usually with cane and walker      Vitals:    10/24/24 1135   BP: 136/70   BP Location: Right arm   Patient Position: Sitting   Cuff Size: Adult   Pulse: 58   SpO2: 93%   Weight: 89.4 kg (197 lb)   Height: 177.8 cm (70\")       Body mass index is 28.27 kg/m².     PHYSICAL EXAMINATION:  Constitutional:       Appearance: Not in distress. Frail.   HENT:         Comments: Raspy voice  Neck:      Vascular: No JVR. JVD normal.   Pulmonary:      Effort: Pulmonary effort is normal.      Breath sounds: Normal breath sounds. No wheezing. No rhonchi. No rales.   Chest:      Chest wall: Not tender to palpatation.   Cardiovascular:      PMI at left midclavicular line. Normal rate. Frequent ectopic beats. Regular rhythm. Normal S1. Normal S2.       Murmurs: There is no murmur.      No gallop.  No click. No rub.   Pulses:     Intact distal pulses.   Edema:     Peripheral edema absent.   Abdominal:      General: Bowel sounds are normal.      Palpations: Abdomen is soft.      Tenderness: There is no abdominal tenderness. "   Musculoskeletal: Normal range of motion.         General: No tenderness. Skin:     General: Skin is warm and dry.   Neurological:      General: No focal deficit present.      Mental Status: Alert and oriented to person, place and time.         Diagnostic Data:  Procedures  Lab Results   Component Value Date    CHLPL 133 04/21/2023    TRIG 83 04/21/2023    HDL 50 04/21/2023     Lab Results   Component Value Date    GLUCOSE 121 (H) 04/21/2023    BUN 17 04/21/2023    CREATININE 1.55 (H) 04/21/2023     04/21/2023    K 4.2 04/21/2023     04/21/2023    CO2 24 04/21/2023     Lab Results   Component Value Date    HGBA1C 6.6 (H) 01/05/2024     Lab Results   Component Value Date    WBC 12.30 (H) 10/07/2024    HGB 15.1 10/07/2024    HCT 46.6 10/07/2024     10/07/2024         Advance Care Planning            ASSESSMENT:   Diagnosis Plan   1. DILL (dyspnea on exertion)        2. PVC (premature ventricular contraction)        3. Mixed hyperlipidemia        4. Type 2 diabetes mellitus with hyperglycemia, without long-term current use of insulin              PLAN:  Dyspnea on exertion/anginal equivalent- historical nonobstructive coronary disease preserved LV function has been greater than 5 years since his prior evaluation.  Redocument echocardiogram and Lexiscan Cardiolite    PVC ,very frequent and unchanged from historical pattern on EKG is noted.  He has RVOT morphology.  Had tried carvedilol had some hypotension will transition to bisoprolol    Hypertension controlled currently off pharmacotherapy.  Given his diabetes would consider ARB    Diabetes on oral agents with A1c 7.0    Dyslipidemia controlled on statin therapy      No ref. provider found, thank you for referring Mr. Douglas for evaluation.  I have forwarded my electronically generated recommendations to you for review.  Please do not hesitate to call with any questions.      James Parker MD, [facility]

## 2024-11-04 ENCOUNTER — TELEPHONE (OUTPATIENT)
Dept: CARDIOLOGY | Facility: CLINIC | Age: 79
End: 2024-11-04
Payer: MEDICARE

## 2024-11-04 NOTE — TELEPHONE ENCOUNTER
Clarified pt is only taking bisoprolol 5 mg per [facility]'s last office visit. No further questions at this time .

## 2024-11-04 NOTE — TELEPHONE ENCOUNTER
Caller: Rupinder Verdugo    Relationship: Emergency Contact    Best call back number: [phone]    What is the best time to reach you: ANYTIME    Who are you requesting to speak with (clinical staff, provider,  specific staff member): CLINICAL    What was the call regarding: PT'S DAUGHTER RUPINDER IS ASKING FOR MEDICATION ADVICE REGARDING CARVEDILOL AND bisoprolol (ZEBeta) 5 MG tablet. PLEASE CALL HER BACK TO DISCUSS WHICH OF THESE MEDICATIONS WAS CUT FROM PT'S MEDICATION LIST.

## 2024-11-21 ENCOUNTER — TELEPHONE (OUTPATIENT)
Dept: CARDIOLOGY | Facility: CLINIC | Age: 79
End: 2024-11-21
Payer: MEDICARE

## 2024-11-21 NOTE — TELEPHONE ENCOUNTER
Caller: Maggie Douglas    Relationship: Emergency Contact    Best call back number: [phone]    Which medication are you concerned about: BISOPROLOL 5 MG    Who prescribed you this medication:     When did you start taking this medication: 10.24.24    What are your concerns: PT WAS RECENTLY SEEN AT PRIMARY CARE. HIS BP /60. THEY WERE TOLD TO CHECK WITH CARDIOLOGY BECAUSE THE MEDICATION MAY BE CAUSING IT TO BE LOW. PLEASE REACH OUT TO HIS WIFE TO ADDRESS THIS.     How long have you had these concerns:

## 2024-11-21 NOTE — TELEPHONE ENCOUNTER
Spoke to pts spouse, regarding blood pressure of 117/60. Pt reports occasional dizziness . Encouraged pt to monitor blood pressure for the next week , call back with results. Verbalized understanding .

## 2024-12-03 DIAGNOSIS — E78.2 MIXED HYPERLIPIDEMIA: ICD-10-CM

## 2024-12-04 RX ORDER — ATORVASTATIN CALCIUM 10 MG/1
10 TABLET, FILM COATED ORAL DAILY
Qty: 90 TABLET | Refills: 3 | OUTPATIENT
Start: 2024-12-04

## 2025-01-22 ENCOUNTER — TELEPHONE (OUTPATIENT)
Dept: CARDIOLOGY | Facility: CLINIC | Age: 80
End: 2025-01-22
Payer: MEDICARE

## 2025-01-22 NOTE — TELEPHONE ENCOUNTER
Caller: Rupinder Verdugo    Relationship: Emergency Contact    Best call back number: [phone]    What is the best time to reach you: AFTERNOONS    Who are you requesting to speak with (clinical staff, provider,  specific staff member): CLINICAL    What was the call regarding: PATIENT'S DAUGHTER CALLED TO INQUIRE WHEN DR GUILLORY WANTS TO SEE THE PATIENT. LAST APPT WAS OCT 2024 AND CURRENTLY THERE IS NOTHING SCHEDULED. PLEASE CONTACT PATIENT'S DAUGHTER IN REGARDS TO THIS ISSUE.    Is it okay if the provider responds through [email or web address] [email or web address]: PLEASE CALL     yes

## 2025-02-06 ENCOUNTER — HOSPITAL ENCOUNTER (OUTPATIENT)
Dept: RADIATION ONCOLOGY | Facility: HOSPITAL | Age: 80
Setting detail: RADIATION/ONCOLOGY SERIES
Discharge: HOME | End: 2025-02-06
Payer: MEDICARE

## 2025-02-06 ENCOUNTER — CLINICAL SUPPORT (OUTPATIENT)
Dept: RADIATION ONCOLOGY | Facility: HOSPITAL | Age: 80
End: 2025-02-06
Payer: MEDICARE

## 2025-02-06 DIAGNOSIS — C61 PROSTATE CANCER: Primary | ICD-10-CM

## 2025-02-06 NOTE — PROGRESS NOTES
TELEMEDICINE FOLLOW UP NOTE    PATIENT:                                                      Antione Douglas  MEDICAL RECORD #:                        [id number]  :                                                          1945  COMPLETION DATE:                                    2024  DIAGNOSIS:                                                   Prostate adenocarcinoma         -cT1c N0 M0      This visit has been converted to a telehealth virtual visit, the patient's preferred method for today's follow-up.  Total time of discussion was 8 minutes.  The patient has given verbal consent.      BRIEF HISTORY:  The patient is a very pleasant 79 y.o. male with a history of diabetes, hypertension, hyperlipidemia, diabetes related polyneuropathy, and chronic kidney disease.  Around the time of a more recent diagnosis of prostate cancer, he developed sudden incontinence of urine.  The patient had an elevated PSA of 10.3 and was then referred to Dr. Eason who sent the patient for an MRI scan.  This showed 4.7 x 4.7 cm prostate.  The patient did have a couple of small lesions in the prostate but no evidence of extraprostatic disease.     The patient had a biopsy in 2024 with Dr. Eason that showed Ilda 3+4 = 7 disease and 3 out of 5 cores.  Mostly low percentage of the cores.  The patient also had a biopsy of the region of interest showing Ilda 3+3 = 6 disease and 5% of the left anterior lateral zone.  The patient then had a bone scan that showed uptake in a couple of his left ribs.  The patient also reports a history of falls.     The patient discussed treatment options with the Dr. Eason and was uninterested in surgery.  The patient was referred to our clinic to consider radiation treatments and was most interested in CyberKnife.  Following placement of gold seed fiducials, the patient underwent definitive treatment consisting of CyberKnife SBRT, 35 Gray/5 fractions, delivered to the prostate.  He  completed treatments 6/20/2024.  The patient tolerated treatment well.      Since we last saw the patient, he reports no change from baseline regarding his chronic, moderate LUTS.  He is no longer on Flomax.  He reports stable voiding, citing frequency and nocturia x2-3 as his main symptoms.  He continues with occasional urinary incontinence episodes requiring use of Depends.  He denies gross hematuria or dysuria.  The patient reports normal bowel habits, and denies hematochezia or acute GI complaints.  He denies unexplained bone pains or weight loss.  Initial posttreatment PSA on 9/10/2024 dropped to a value of 1.1.         MEDICATIONS: Medication reconciliation for the patient was reviewed and confirmed in the electronic medical record.    Review of Systems   Genitourinary:  Positive for bladder incontinence, frequency and nocturia.    Psychiatric/Behavioral:  Positive for sleep disturbance. The patient is nervous/anxious.    All other systems reviewed and are negative.        KPS 80%      Physical Exam  Pulmonary:      Respirations even, unlabored. No audible wheezing or cough.  Neurological:      A+Ox4, conversant, answers questions appropriately.  Psychiatric:     Judgement, affect, and decision-making WNL.    Limited physical exam as visit was conducted remotely via telephone.              The following portions of the patient's history were reviewed and updated as appropriate: allergies, current medications, past family history, past medical history, past social history, past surgical history and problem list.         Diagnoses and all orders for this visit:    1. Prostate cancer (Primary)         IMPRESSION:  The patient is a very pleasant 79-year-old gentleman with a clinical T1 cN0 M0 adenocarcinoma of the prostate, Pickford 3+4 = 7, with pretreatment PSA 10.3.  The patient is a very poor surgical candidate given his multiple medical comorbidities.  The patient completed definitive SBRT treatments to the  prostate on 6/20/2024.  He tolerated treatment well.  No symptomology to suggest ongoing radiation related toxicities at this time.  Urinary status is reportedly stable and he was able to discontinue alpha-blocker.  He has had an excellent early biochemical response to treatment with decline in PSA down to a value of 1.1.  The patient and I again reviewed follow-up intervals, biannual PSA monitoring, and continued expectations for response to treatment, including typical timeline for PSA to hopefully continue to decline and reach target julio value of <0.5.  The patient continues routine urologic surveillance under the care of Dr. Eason, with follow-up and repeat PSA scheduled 3/19/2025.  RTC in 1 year, or sooner as needed.        RECOMMENDATIONS:      Intermediate risk prostate cancer  -Clinical T1c N0 M0  -Ilda 3+4 = 7  -Pretreatment PSA 10.3  -High risk of complications from any type of radiation  -Pre-existing incontinence likely related to diabetes and polyneuropathy  -Recommend CyberKnife weekly 35 Gray 5 fractions  -Status post fiducial marker placement on 4/26/2024 with Dr. Mejía  -Has been evaluated by cardiology and OK to proceed with treatment  -Status post CyberKnife SBRT 35 Gray in 5 fractions              -completed 6/20/2024  -PSA 9/10/2024 = 1.1  -Follows with Dr. Corby Eason   -Next appt with PSA 3/19/2025  -RTC 1 year or sooner as needed     Hypertension  -Follows with Dr. Acosta of cardiology     DM Type II  -Oral DM medication per PCP  -A1c 6.7%  -Diabetic polyneuropathy possible contributor to pre-existing urinary incontinence       Return in about 1 year (around 2/6/2026) for Office Visit.    BRANDO Cooper      I spent a total of 19 minutes on today's visit, with more than 8 minutes in virtual communication with the patient via telephone, and the remainder of the time spent in reviewing the relevant history, records, available imaging, and for documentation.

## (undated) DEVICE — TRY L/P SFTY A/20G